# Patient Record
Sex: MALE | Race: BLACK OR AFRICAN AMERICAN | NOT HISPANIC OR LATINO
[De-identification: names, ages, dates, MRNs, and addresses within clinical notes are randomized per-mention and may not be internally consistent; named-entity substitution may affect disease eponyms.]

---

## 2019-12-30 PROBLEM — Z00.00 ENCOUNTER FOR PREVENTIVE HEALTH EXAMINATION: Status: ACTIVE | Noted: 2019-12-30

## 2020-01-09 ENCOUNTER — APPOINTMENT (OUTPATIENT)
Dept: UROLOGY | Facility: CLINIC | Age: 70
End: 2020-01-09
Payer: MEDICARE

## 2020-01-09 DIAGNOSIS — Z86.79 PERSONAL HISTORY OF OTHER DISEASES OF THE CIRCULATORY SYSTEM: ICD-10-CM

## 2020-01-09 DIAGNOSIS — I10 ESSENTIAL (PRIMARY) HYPERTENSION: ICD-10-CM

## 2020-01-09 DIAGNOSIS — Z80.8 FAMILY HISTORY OF MALIGNANT NEOPLASM OF OTHER ORGANS OR SYSTEMS: ICD-10-CM

## 2020-01-09 DIAGNOSIS — Z85.46 PERSONAL HISTORY OF MALIGNANT NEOPLASM OF PROSTATE: ICD-10-CM

## 2020-01-09 DIAGNOSIS — I48.20 CHRONIC ATRIAL FIBRILLATION, UNSP: ICD-10-CM

## 2020-01-09 DIAGNOSIS — Z80.42 FAMILY HISTORY OF MALIGNANT NEOPLASM OF PROSTATE: ICD-10-CM

## 2020-01-09 PROCEDURE — 99203 OFFICE O/P NEW LOW 30 MIN: CPT

## 2020-01-09 RX ORDER — WARFARIN 4 MG/1
TABLET ORAL
Refills: 0 | Status: ACTIVE | COMMUNITY

## 2020-01-09 NOTE — PHYSICAL EXAM
[General Appearance - Well Developed] : well developed [Normal Appearance] : normal appearance [General Appearance - Well Nourished] : well nourished [Well Groomed] : well groomed [Edema] : no peripheral edema [General Appearance - In No Acute Distress] : no acute distress [Respiration, Rhythm And Depth] : normal respiratory rhythm and effort [Exaggerated Use Of Accessory Muscles For Inspiration] : no accessory muscle use [Abdomen Soft] : soft [Abdomen Tenderness] : non-tender [Costovertebral Angle Tenderness] : no ~M costovertebral angle tenderness [Urethral Meatus] : meatus normal [Penis Abnormality] : normal uncircumcised penis [Urinary Bladder Findings] : the bladder was normal on palpation [No Prostate Nodules] : no prostate nodules [Scrotum] : the scrotum was normal [Testes Mass (___cm)] : there were no testicular masses [Prostate Size ___ gm] : prostate size [unfilled] gm [Normal Station and Gait] : the gait and station were normal for the patient's age [No Focal Deficits] : no focal deficits [] : no rash [Oriented To Time, Place, And Person] : oriented to person, place, and time [Affect] : the affect was normal [No Palpable Adenopathy] : no palpable adenopathy [Not Anxious] : not anxious [Mood] : the mood was normal

## 2020-01-10 NOTE — HISTORY OF PRESENT ILLNESS
[FreeTextEntry1] : Here to establish urologic care.\par Has prior history of prostate cancer status post radiation therapy about 5 years ago.\par He has been followed by an outside urologist and his PSAs remain low.  He is not aware of what his last PSA value was it was less than 1 ng/mL.\par \par He denies gross hematuria, dysuria.  Overall urinary function is excellent.  Good urinary flow, complete bladder emptying.  No stress or urge incontinence.\par \par No abdominal pain or flank pain.  Tolerating regular diet, no weight loss, bone pain.

## 2020-01-13 LAB — PSA SERPL-MCNC: 0.11 NG/ML

## 2021-10-06 PROBLEM — I10 ESSENTIAL HYPERTENSION: Status: ACTIVE | Noted: 2020-01-09

## 2021-11-24 ENCOUNTER — RESULT REVIEW (OUTPATIENT)
Age: 71
End: 2021-11-24

## 2022-03-08 ENCOUNTER — RESULT REVIEW (OUTPATIENT)
Age: 72
End: 2022-03-08

## 2022-03-16 ENCOUNTER — INPATIENT (INPATIENT)
Facility: HOSPITAL | Age: 72
LOS: 4 days | Discharge: HOME | End: 2022-03-21
Attending: INTERNAL MEDICINE | Admitting: INTERNAL MEDICINE
Payer: MEDICARE

## 2022-03-16 VITALS
RESPIRATION RATE: 18 BRPM | SYSTOLIC BLOOD PRESSURE: 222 MMHG | TEMPERATURE: 98 F | HEART RATE: 77 BPM | DIASTOLIC BLOOD PRESSURE: 105 MMHG | OXYGEN SATURATION: 99 %

## 2022-03-16 DIAGNOSIS — R94.31 ABNORMAL ELECTROCARDIOGRAM [ECG] [EKG]: ICD-10-CM

## 2022-03-16 DIAGNOSIS — I10 ESSENTIAL (PRIMARY) HYPERTENSION: ICD-10-CM

## 2022-03-16 DIAGNOSIS — I48.20 CHRONIC ATRIAL FIBRILLATION, UNSPECIFIED: ICD-10-CM

## 2022-03-16 DIAGNOSIS — N17.9 ACUTE KIDNEY FAILURE, UNSPECIFIED: ICD-10-CM

## 2022-03-16 DIAGNOSIS — Z87.828 PERSONAL HISTORY OF OTHER (HEALED) PHYSICAL INJURY AND TRAUMA: Chronic | ICD-10-CM

## 2022-03-16 DIAGNOSIS — I50.21 ACUTE SYSTOLIC (CONGESTIVE) HEART FAILURE: ICD-10-CM

## 2022-03-16 DIAGNOSIS — Z29.9 ENCOUNTER FOR PROPHYLACTIC MEASURES, UNSPECIFIED: ICD-10-CM

## 2022-03-16 DIAGNOSIS — I16.1 HYPERTENSIVE EMERGENCY: ICD-10-CM

## 2022-03-16 LAB
ALBUMIN SERPL ELPH-MCNC: 4.3 G/DL — SIGNIFICANT CHANGE UP (ref 3.3–5)
ALP SERPL-CCNC: 83 U/L — SIGNIFICANT CHANGE UP (ref 40–120)
ALT FLD-CCNC: 21 U/L — SIGNIFICANT CHANGE UP (ref 4–41)
ANION GAP SERPL CALC-SCNC: 12 MMOL/L — SIGNIFICANT CHANGE UP (ref 7–14)
APTT BLD: 38.8 SEC — HIGH (ref 27–36.3)
AST SERPL-CCNC: 20 U/L — SIGNIFICANT CHANGE UP (ref 4–40)
BASOPHILS # BLD AUTO: 0.05 K/UL — SIGNIFICANT CHANGE UP (ref 0–0.2)
BASOPHILS NFR BLD AUTO: 0.6 % — SIGNIFICANT CHANGE UP (ref 0–2)
BILIRUB SERPL-MCNC: 0.4 MG/DL — SIGNIFICANT CHANGE UP (ref 0.2–1.2)
BUN SERPL-MCNC: 32 MG/DL — HIGH (ref 7–23)
CALCIUM SERPL-MCNC: 10.1 MG/DL — SIGNIFICANT CHANGE UP (ref 8.4–10.5)
CHLORIDE SERPL-SCNC: 106 MMOL/L — SIGNIFICANT CHANGE UP (ref 98–107)
CO2 SERPL-SCNC: 27 MMOL/L — SIGNIFICANT CHANGE UP (ref 22–31)
CREAT SERPL-MCNC: 1.51 MG/DL — HIGH (ref 0.5–1.3)
EGFR: 49 ML/MIN/1.73M2 — LOW
EOSINOPHIL # BLD AUTO: 0.06 K/UL — SIGNIFICANT CHANGE UP (ref 0–0.5)
EOSINOPHIL NFR BLD AUTO: 0.7 % — SIGNIFICANT CHANGE UP (ref 0–6)
FLUAV AG NPH QL: SIGNIFICANT CHANGE UP
FLUBV AG NPH QL: SIGNIFICANT CHANGE UP
GLUCOSE BLDC GLUCOMTR-MCNC: 101 MG/DL — HIGH (ref 70–99)
GLUCOSE BLDC GLUCOMTR-MCNC: 102 MG/DL — HIGH (ref 70–99)
GLUCOSE BLDC GLUCOMTR-MCNC: 162 MG/DL — HIGH (ref 70–99)
GLUCOSE SERPL-MCNC: 109 MG/DL — HIGH (ref 70–99)
HCT VFR BLD CALC: 38.3 % — LOW (ref 39–50)
HGB BLD-MCNC: 11.8 G/DL — LOW (ref 13–17)
IANC: 6.22 K/UL — SIGNIFICANT CHANGE UP (ref 1.5–8.5)
IMM GRANULOCYTES NFR BLD AUTO: 0.6 % — SIGNIFICANT CHANGE UP (ref 0–1.5)
INR BLD: 2.65 RATIO — HIGH (ref 0.88–1.16)
LYMPHOCYTES # BLD AUTO: 0.91 K/UL — LOW (ref 1–3.3)
LYMPHOCYTES # BLD AUTO: 11.3 % — LOW (ref 13–44)
MAGNESIUM SERPL-MCNC: 2.4 MG/DL — SIGNIFICANT CHANGE UP (ref 1.6–2.6)
MCHC RBC-ENTMCNC: 25.8 PG — LOW (ref 27–34)
MCHC RBC-ENTMCNC: 30.8 GM/DL — LOW (ref 32–36)
MCV RBC AUTO: 83.8 FL — SIGNIFICANT CHANGE UP (ref 80–100)
MONOCYTES # BLD AUTO: 0.74 K/UL — SIGNIFICANT CHANGE UP (ref 0–0.9)
MONOCYTES NFR BLD AUTO: 9.2 % — SIGNIFICANT CHANGE UP (ref 2–14)
NEUTROPHILS # BLD AUTO: 6.22 K/UL — SIGNIFICANT CHANGE UP (ref 1.8–7.4)
NEUTROPHILS NFR BLD AUTO: 77.6 % — HIGH (ref 43–77)
NRBC # BLD: 0 /100 WBCS — SIGNIFICANT CHANGE UP
NRBC # FLD: 0 K/UL — SIGNIFICANT CHANGE UP
NT-PROBNP SERPL-SCNC: 3744 PG/ML — HIGH
PLATELET # BLD AUTO: 196 K/UL — SIGNIFICANT CHANGE UP (ref 150–400)
POTASSIUM SERPL-MCNC: 5.2 MMOL/L — SIGNIFICANT CHANGE UP (ref 3.5–5.3)
POTASSIUM SERPL-SCNC: 5.2 MMOL/L — SIGNIFICANT CHANGE UP (ref 3.5–5.3)
PROT SERPL-MCNC: 8.2 G/DL — SIGNIFICANT CHANGE UP (ref 6–8.3)
PROTHROM AB SERPL-ACNC: 31 SEC — HIGH (ref 10.5–13.4)
RBC # BLD: 4.57 M/UL — SIGNIFICANT CHANGE UP (ref 4.2–5.8)
RBC # FLD: 17.9 % — HIGH (ref 10.3–14.5)
RSV RNA NPH QL NAA+NON-PROBE: SIGNIFICANT CHANGE UP
SARS-COV-2 RNA SPEC QL NAA+PROBE: SIGNIFICANT CHANGE UP
SODIUM SERPL-SCNC: 145 MMOL/L — SIGNIFICANT CHANGE UP (ref 135–145)
TROPONIN T, HIGH SENSITIVITY RESULT: 31 NG/L — SIGNIFICANT CHANGE UP
TROPONIN T, HIGH SENSITIVITY RESULT: 31 NG/L — SIGNIFICANT CHANGE UP
WBC # BLD: 8.03 K/UL — SIGNIFICANT CHANGE UP (ref 3.8–10.5)
WBC # FLD AUTO: 8.03 K/UL — SIGNIFICANT CHANGE UP (ref 3.8–10.5)

## 2022-03-16 PROCEDURE — 71046 X-RAY EXAM CHEST 2 VIEWS: CPT | Mod: 26

## 2022-03-16 PROCEDURE — 99223 1ST HOSP IP/OBS HIGH 75: CPT

## 2022-03-16 PROCEDURE — 99285 EMERGENCY DEPT VISIT HI MDM: CPT

## 2022-03-16 RX ORDER — INSULIN LISPRO 100/ML
VIAL (ML) SUBCUTANEOUS AT BEDTIME
Refills: 0 | Status: DISCONTINUED | OUTPATIENT
Start: 2022-03-16 | End: 2022-03-21

## 2022-03-16 RX ORDER — SODIUM CHLORIDE 9 MG/ML
1000 INJECTION, SOLUTION INTRAVENOUS
Refills: 0 | Status: DISCONTINUED | OUTPATIENT
Start: 2022-03-16 | End: 2022-03-21

## 2022-03-16 RX ORDER — DEXTROSE 50 % IN WATER 50 %
12.5 SYRINGE (ML) INTRAVENOUS ONCE
Refills: 0 | Status: DISCONTINUED | OUTPATIENT
Start: 2022-03-16 | End: 2022-03-21

## 2022-03-16 RX ORDER — DEXTROSE 50 % IN WATER 50 %
15 SYRINGE (ML) INTRAVENOUS ONCE
Refills: 0 | Status: DISCONTINUED | OUTPATIENT
Start: 2022-03-16 | End: 2022-03-21

## 2022-03-16 RX ORDER — DEXTROSE 50 % IN WATER 50 %
25 SYRINGE (ML) INTRAVENOUS ONCE
Refills: 0 | Status: DISCONTINUED | OUTPATIENT
Start: 2022-03-16 | End: 2022-03-21

## 2022-03-16 RX ORDER — INFLUENZA VIRUS VACCINE 15; 15; 15; 15 UG/.5ML; UG/.5ML; UG/.5ML; UG/.5ML
0.7 SUSPENSION INTRAMUSCULAR ONCE
Refills: 0 | Status: DISCONTINUED | OUTPATIENT
Start: 2022-03-16 | End: 2022-03-21

## 2022-03-16 RX ORDER — HYDRALAZINE HCL 50 MG
50 TABLET ORAL EVERY 8 HOURS
Refills: 0 | Status: DISCONTINUED | OUTPATIENT
Start: 2022-03-16 | End: 2022-03-19

## 2022-03-16 RX ORDER — GLUCAGON INJECTION, SOLUTION 0.5 MG/.1ML
1 INJECTION, SOLUTION SUBCUTANEOUS ONCE
Refills: 0 | Status: DISCONTINUED | OUTPATIENT
Start: 2022-03-16 | End: 2022-03-21

## 2022-03-16 RX ORDER — FUROSEMIDE 40 MG
40 TABLET ORAL DAILY
Refills: 0 | Status: DISCONTINUED | OUTPATIENT
Start: 2022-03-16 | End: 2022-03-17

## 2022-03-16 RX ORDER — AMLODIPINE BESYLATE 2.5 MG/1
10 TABLET ORAL ONCE
Refills: 0 | Status: COMPLETED | OUTPATIENT
Start: 2022-03-16 | End: 2022-03-16

## 2022-03-16 RX ORDER — INSULIN LISPRO 100/ML
VIAL (ML) SUBCUTANEOUS
Refills: 0 | Status: DISCONTINUED | OUTPATIENT
Start: 2022-03-16 | End: 2022-03-21

## 2022-03-16 RX ORDER — METOPROLOL TARTRATE 50 MG
200 TABLET ORAL DAILY
Refills: 0 | Status: DISCONTINUED | OUTPATIENT
Start: 2022-03-16 | End: 2022-03-21

## 2022-03-16 RX ORDER — HYDRALAZINE HCL 50 MG
50 TABLET ORAL THREE TIMES A DAY
Refills: 0 | Status: DISCONTINUED | OUTPATIENT
Start: 2022-03-16 | End: 2022-03-16

## 2022-03-16 RX ORDER — FUROSEMIDE 40 MG
40 TABLET ORAL ONCE
Refills: 0 | Status: DISCONTINUED | OUTPATIENT
Start: 2022-03-16 | End: 2022-03-16

## 2022-03-16 RX ORDER — ATORVASTATIN CALCIUM 80 MG/1
10 TABLET, FILM COATED ORAL AT BEDTIME
Refills: 0 | Status: DISCONTINUED | OUTPATIENT
Start: 2022-03-16 | End: 2022-03-21

## 2022-03-16 RX ORDER — ACETAMINOPHEN 500 MG
650 TABLET ORAL EVERY 6 HOURS
Refills: 0 | Status: DISCONTINUED | OUTPATIENT
Start: 2022-03-16 | End: 2022-03-21

## 2022-03-16 RX ADMIN — Medication 50 MILLIGRAM(S): at 21:43

## 2022-03-16 RX ADMIN — Medication 1: at 18:05

## 2022-03-16 RX ADMIN — AMLODIPINE BESYLATE 10 MILLIGRAM(S): 2.5 TABLET ORAL at 14:33

## 2022-03-16 RX ADMIN — Medication 40 MILLIGRAM(S): at 18:07

## 2022-03-16 RX ADMIN — ATORVASTATIN CALCIUM 10 MILLIGRAM(S): 80 TABLET, FILM COATED ORAL at 21:43

## 2022-03-16 NOTE — ED PROVIDER NOTE - CLINICAL SUMMARY MEDICAL DECISION MAKING FREE TEXT BOX
71M presents with hyptension and possibly EKG changes as outpatient. Will Check labs, trops, EKG. Likely admit for further ACS w/u. 71M presents with hypertension and possibly EKG changes as outpatient. Will Check labs, trops, EKG. Likely admit for further ACS w/u.
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
## 2022-03-16 NOTE — H&P ADULT - PROBLEM SELECTOR PLAN 2
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

## 2022-03-16 NOTE — ED PROVIDER NOTE - OBJECTIVE STATEMENT
71M PMH HTN, CHF, afib on warfarin, presents from PMD for HTN. Pt went in for routine visit today, but was seen by a different MD. MD states when in the office pt had elevated blood pressure, diaphoresis in EKG changes in precordial leads. Pt did not have CP at that time. Also reports pt had small increase in his creatinine since basline. Pt reports baseline amount of LE edema and exercise tolerance.

## 2022-03-16 NOTE — H&P ADULT - NSHPREVIEWOFSYSTEMS_GEN_ALL_CORE
REVIEW OF SYSTEMS:    CONSTITUTIONAL: No weakness, fevers or chills, + diaphoresis  EYES/ENT: No visual changes;  No dysphagia; No sore throat; No rhinorrhea; No sinus pain/pressure  NECK: No pain or stiffness  RESPIRATORY: No cough, wheezing, hemoptysis; No shortness of breath  CARDIOVASCULAR: No chest pain or palpitations; + chronic lower extremity edema  GASTROINTESTINAL: No abdominal or epigastric pain. No nausea, vomiting, or hematemesis; No diarrhea or constipation. No melena or hematochezia.  GENITOURINARY: No dysuria, frequency or hematuria  NEUROLOGICAL: No numbness or weakness  MSK: ambulates with walker  SKIN: No itching, burning, rashes, or lesions   All other review of systems is negative unless indicated above.

## 2022-03-16 NOTE — ED ADULT TRIAGE NOTE - CHIEF COMPLAINT QUOTE
Patient brought to ER by EMS from PCP physical and sent over for elevated blood pressure. No symptoms. Pt is compliant with meds.

## 2022-03-16 NOTE — H&P ADULT - PROBLEM SELECTOR PLAN 3
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

## 2022-03-16 NOTE — CONSULT NOTE ADULT - SUBJECTIVE AND OBJECTIVE BOX
EP Attending    HISTORY OF PRESENT ILLNESS:   Mr Connelly is a pleasant 71yoM who presents to the ED from his primary care doctor's office for concern of severely elevated blood pressure today, and worsening shortness of breath for 2+ weeks.  He was previously able to walk a few blocks without stopping to catch his breath, now cannot walk one block.  States he has had AFib for ~4 years, takes Warfarin, with a goal INR of 2-3.  He has hypertension and CKD.  Has intentionally lost ~25 lbs.  No orthopnea, sleeps on his side with 2 pillows (stable).  No fainting or lightheadedness.   He has never used supplemental oxygen.  No prior blood clots, no prior smoking beyond age 16.  A 10 pt ROS is otherwise negative.    PAST MEDICAL & SURGICAL HISTORY:  Gout    Diabetes    Hypertension    Afib    No significant past surgical history    MEDICATIONS  (STANDING):      Allergies    No Known Allergies    Intolerances    FAMILY HISTORY:  No significant family history      Non-contributary for premature coronary disease or sudden cardiac death    SOCIAL HISTORY:    [x ] Non-smoker  [ ] Smoker  [ ] Alcohol    PHYSICAL EXAM:  T(C): 36.7 (03-16-22 @ 13:00), Max: 36.7 (03-16-22 @ 13:00)  HR: 85 (03-16-22 @ 14:16) (77 - 85)  BP: 192/98 (03-16-22 @ 14:16) (192/98 - 222/105)  RR: 18 (03-16-22 @ 14:16) (18 - 18)  SpO2: 100% (03-16-22 @ 14:16) (99% - 100%)  Wt(kg): --    Appearance: pleasant adult male in no acute distress, nondiaphoretic, cooperative.  HEENT:   Normal oral mucosa, PERRL, EOMI	  Lymphatic: No lymphadenopathy , trace ankle edema BL  Cardiovascular: irregular rhythm / normal rate, S1 S2, visible JVD, No murmurs , Peripheral pulses palpable 2+ bilaterally  Respiratory: Lungs clear to auscultation, normal effort but tachypneic 	  Gastrointestinal:  Soft, Non-tender, + BS	  Skin: No rashes, No ecchymoses, No cyanosis, warm to touch  Musculoskeletal: Normal range of motion, normal strength  Psychiatry:  Mood & affect appropriate    TELEMETRY: AF rate controlled  ECG:  AF, no R waves out to V3 / pattern of prior anteroseptal MI.	  	  LABS:	 	                          11.8   8.03  )-----------( 196      ( 16 Mar 2022 14:35 )             38.3     03-16    145  |  106  |  32<H>  ----------------------------<  109<H>  5.2   |  27  |  1.51<H>    Ca    10.1      16 Mar 2022 14:22  Mg     2.40     03-16    TPro  8.2  /  Alb  4.3  /  TBili  0.4  /  DBili  x   /  AST  20  /  ALT  21  /  AlkPhos  83  03-16    proBNP: Serum Pro-Brain Natriuretic Peptide: 3744 pg/mL (03-16 @ 14:22)    ASSESSMENT/PLAN: 	71y Male with chronic AFib here with signs of diastolic CHF and right heart failure (JVD, edema, abd distension, SOB on exertion).    Recommend admission to telemetry bed.  Echocardiogram.  Remainder of Cv workup per Dr Kasper.  Maintain telemetry.  Maintain HR control with beta blockers as prescribed in outpatient setting.  Maintain Warfarin for goal INR of 2-3.  As an outpatient he could transition to Apixaban 5mg BID.  Will follow.      Minor Tolbert M.D.  Cardiac Electrophysiology    office [phone]  pager [phone]

## 2022-03-16 NOTE — H&P ADULT - NSHPPHYSICALEXAM_GEN_ALL_CORE
PHYSICAL EXAM:  VITALS: Vital Signs Last 24 Hrs  T(C): 36.9 (16 Mar 2022 17:47), Max: 36.9 (16 Mar 2022 17:47)  T(F): 98.4 (16 Mar 2022 17:47), Max: 98.4 (16 Mar 2022 17:47)  HR: 86 (16 Mar 2022 17:47) (77 - 86)  BP: 166/70 (16 Mar 2022 17:47) (166/70 - 222/105)  BP(mean): --  RR: 18 (16 Mar 2022 17:47) (18 - 18)  SpO2: 100% (16 Mar 2022 17:47) (99% - 100%)  GENERAL: NAD, well-developed, well-nourished  HEAD:  Atraumatic, Normocephalic  EYES: EOMI, PERRL, conjunctiva and sclera clear  ENT: Moist Mucus Membranes present, no ulcers appreciated  NECK: Supple, difficult to appreciate JVD due to thick neck  CHEST/LUNG: Clear to auscultation bilaterally; No wheezes, rales or rhonchi, no accessory muscle use  HEART: Regular rate and rhythm; No murmurs, rubs, or gallops, (+)S1, S2  ABDOMEN: Soft, Nontender, Nondistended; Normal Bowel sounds   EXTREMITIES:  2+ Peripheral Pulses, No clubbing, cyanosis, no LE edema b/l  PSYCH: normal mood and affect  NEUROLOGY: AAOx3, non-focal  SKIN: No rashes or lesions

## 2022-03-16 NOTE — H&P ADULT - HISTORY OF PRESENT ILLNESS
This is a 70 y/o M with pmhx of HTN, CHF, afib, DM presented to the ED for HTN from clinic. The patient is his usual baseline, no symptoms and feeling well, presented to routine check up at his PCP. The patient found to have severely elevated BP in the 200s and abnormal EKG. 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 Patient denies hx of CAD or MI in the past. The patient also endorsed some mild diaphoresis in the office but attributed it to wearing a lot of clothing. The PCP was concerned so he was sent to the ED for evaluation. The patient also states he has chronic SAENZ for a long time and it doesn't bother him. He currently denies new headaches or focal deficits. He currently denies chest pain and shortness of breath at rest. He is known to have chronic LE edema which has not changed. Denies hx of kidney disease

## 2022-03-16 NOTE — H&P ADULT - ASSESSMENT
72 y/o M with pmhx of HTN, CHF, afib, DM presented to the ED for HTN from clinic. 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 Admit for HTN emergency, RERE and ACS work up.

## 2022-03-16 NOTE — H&P ADULT - PROBLEM SELECTOR PLAN 6
[Time Spent: ___ minutes] : I have spent [unfilled] minutes of time on the encounter.
already anticoagulated

## 2022-03-16 NOTE — ED ADULT NURSE NOTE - OBJECTIVE STATEMENT
patient arrives to the ER A&Ox4, ambulatory with walker at baseline. patient arrives from PCP office for elevated BP and states "they didn't like something on my EKG". patient has PMH of Afib, takes coumadin daily. patient also states he has a PMH of HTN and DM, compliant with meds. patient is well appearing, denies any chest pain, shortness of breath, nausea or vomiting. breathing even and unlabored, pallor/diaphoresis not noted. 20G IV placed in L AC. Labs sent. all safety maintained at this time. no acute distress noted. vital signs as charted. patient pending results currently.

## 2022-03-16 NOTE — CONSULT NOTE ADULT - ATTENDING COMMENTS
Patient seen and examined.  Agree with above.   Admitted with volume overload  IV lasix to keep O > I   Check TTE to evaluate LV function   Further workup pending above    Armando Kasper MD

## 2022-03-16 NOTE — CONSULT NOTE ADULT - SUBJECTIVE AND OBJECTIVE BOX
HISTORY OF PRESENT ILLNESS: HPI:    71 year old male with PMH HTN, CKD, DM, Gout, AF on Coumadin, (OP Cardio is Eleno), who presented to ER from PMD where he was found to be hypertensive and admits to worsening SAENZ.  He reports chronic R knee pain that limits his exercise capacity but admits his SAENZ and LE edema has worsened over a few months.  No PND or orthopnea.  Reports compliance with home meds (see list)  His PMD also had concern over EKG findings.    He denies chest pain or hx CAD.  He denies palps, syncope, fever, cough, recent travel.        PAST MEDICAL & SURGICAL HISTORY:  Gout    Diabetes    Hypertension    Afib    No significant past surgical history      MEDICATIONS  (STANDING): home list on paper chart      Allergies  No Known Allergies      FAMILY HISTORY:  No significant family history  Noncontributory for premature coronary disease or sudden cardiac death    SOCIAL HISTORY:    [x ] Non-smoker  [ ] Smoker  [ ] Alcohol    FLU VACCINE THIS YEAR STARTS IN AUGUST:  [ ] Yes    [ ] No    IF OVER 65 HAVE YOU EVER HAD A PNA VACCINE:  [ ] Yes    [ ] No       [ ] N/A      REVIEW OF SYSTEMS:  [ ]chest pain  [ x ]shortness of breath  [  ]palpitations  [  ]syncope  [ ]near syncope [ ]upper extremity weakness   [ ] lower extremity weakness  [  ]diplopia  [  ]altered mental status   [  ]fevers  [ ]chills [ ]nausea  [ ]vomiting  [  ]dysphagia    [ ]abdominal pain  [ ]melena  [ ]BRBPR    [  ]epistaxis  [  ]rash    [ ]lower extremity edema        [x ] All others negative	  [ ] Unable to obtain      LABS:	 	    CARDIAC MARKERS:  trop t 31, 31                        11.8   8.03  )-----------( 196      ( 16 Mar 2022 14:35 )             38.3     145  |  106  |  32<H>  ----------------------------<  109<H>  5.2   |  27  |  1.51<H>    Ca    10.1      16 Mar 2022 14:22  Mg     2.40     03-16    TPro  8.2  /  Alb  4.3  /  TBili  0.4  /  DBili  x   /  AST  20  /  ALT  21  /  AlkPhos  83  03-16  Coags:  PT/INR - ( 16 Mar 2022 15:20 )   PT: 31.0 sec;   INR: 2.65 ratio      PTT - ( 16 Mar 2022 15:20 )  PTT:38.8 sec    proBNP: Serum Pro-Brain Natriuretic Peptide: 3744 pg/mL (03-16 @ 14:22)      PHYSICAL EXAM:  T(C): 36.7 (03-16-22 @ 13:00), Max: 36.7 (03-16-22 @ 13:00)  HR: 85 (03-16-22 @ 16:03) (77 - 85)  BP: 186/101 (03-16-22 @ 16:03) (186/101 - 222/105)  RR: 18 (03-16-22 @ 16:03) (18 - 18)  SpO2: 100% (03-16-22 @ 16:03) (99% - 100%)    Gen: Appears SOB sitting on stretcher  HEENT:  (-)icterus (-)pallor +JVP  CV: N S1 S2 1/6 BRANDON (+)2 Pulses B/l  Resp: diminished BS BL bases  GI: (+) BS Soft, NT, ND  Lymph:  (2+) LE Edema, (-)obvious lymphadenopathy  Skin: Warm to touch, Normal turgor  Psych: Appropriate mood and affect    	  ECG:  	 AF PRWP no TEVIN    CXR report pending    ASSESSMENT/PLAN: 	71 year old male with PMH HTN, CKD, DM, Gout, AF on Coumadin, (OP Cardio is [facility]), who presented to ER from PMD where he was found to be hypertensive and admits to worsening SAENZ.    --Pt with signs of volume overload on exam  --change to IV lasix 40 QD  --will consult renal  --EP consult called  --check TTE  --AF on Coumadin, reports he has never discussed changing to a NOAC.  Will reach out to OP Cardio Dr Johansen and pt pharmacy to see if any NOAC covered by plan  --ischemic eval pending above             HISTORY OF PRESENT ILLNESS: HPI:    71 year old male with PMH HTN, CKD, DM, Gout, AF on Coumadin, (OP Cardio is [facility]), who presented to ER from PMD where he was found to be hypertensive and admits to worsening SAENZ.  He reports chronic R knee pain that limits his exercise capacity but admits his SAENZ and LE edema has worsened over a few months.  No PND or orthopnea.  Reports compliance with home meds (see list)  His PMD also had concern over EKG findings.    He denies chest pain or hx CAD.  He denies palps, syncope, fever, cough, recent travel.        PAST MEDICAL & SURGICAL HISTORY:  Gout    Diabetes    Hypertension    Afib    No significant past surgical history      MEDICATIONS  (STANDING): home list on paper chart      Allergies  No Known Allergies      FAMILY HISTORY:  No significant family history  Noncontributory for premature coronary disease or sudden cardiac death    SOCIAL HISTORY:    [x ] Non-smoker  [ ] Smoker  [ ] Alcohol    FLU VACCINE THIS YEAR STARTS IN AUGUST:  [ ] Yes    [ ] No    IF OVER 65 HAVE YOU EVER HAD A PNA VACCINE:  [ ] Yes    [ ] No       [ ] N/A      REVIEW OF SYSTEMS:  [ ]chest pain  [ x ]shortness of breath  [  ]palpitations  [  ]syncope  [ ]near syncope [ ]upper extremity weakness   [ ] lower extremity weakness  [  ]diplopia  [  ]altered mental status   [  ]fevers  [ ]chills [ ]nausea  [ ]vomiting  [  ]dysphagia    [ ]abdominal pain  [ ]melena  [ ]BRBPR    [  ]epistaxis  [  ]rash    [ ]lower extremity edema        [x ] All others negative	  [ ] Unable to obtain      LABS:	 	    CARDIAC MARKERS:  trop t 31, 31                        11.8   8.03  )-----------( 196      ( 16 Mar 2022 14:35 )             38.3     145  |  106  |  32<H>  ----------------------------<  109<H>  5.2   |  27  |  1.51<H>    Ca    10.1      16 Mar 2022 14:22  Mg     2.40     03-16    TPro  8.2  /  Alb  4.3  /  TBili  0.4  /  DBili  x   /  AST  20  /  ALT  21  /  AlkPhos  83  03-16  Coags:  PT/INR - ( 16 Mar 2022 15:20 )   PT: 31.0 sec;   INR: 2.65 ratio      PTT - ( 16 Mar 2022 15:20 )  PTT:38.8 sec    proBNP: Serum Pro-Brain Natriuretic Peptide: 3744 pg/mL (03-16 @ 14:22)      PHYSICAL EXAM:  T(C): 36.7 (03-16-22 @ 13:00), Max: 36.7 (03-16-22 @ 13:00)  HR: 85 (03-16-22 @ 16:03) (77 - 85)  BP: 186/101 (03-16-22 @ 16:03) (186/101 - 222/105)  RR: 18 (03-16-22 @ 16:03) (18 - 18)  SpO2: 100% (03-16-22 @ 16:03) (99% - 100%)    Gen: Appears SOB sitting on stretcher  HEENT:  (-)icterus (-)pallor +JVP  CV: N S1 S2 1/6 BRANDON (+)2 Pulses B/l  Resp: diminished BS BL bases  GI: (+) BS Soft, NT, ND  Lymph:  (2+) LE Edema, (-)obvious lymphadenopathy  Skin: Warm to touch, Normal turgor  Psych: Appropriate mood and affect    	  ECG:  	 AF PRWP no TEVIN    CXR report pending    ASSESSMENT/PLAN: 	71 year old male with PMH HTN, CKD, DM, Gout, AF on Coumadin, (OP Cardio pattie Johansen), who presented to ER from PMD where he was found to be hypertensive and admits to worsening SAENZ.    --Pt with signs of volume overload on exam  --change to IV lasix 40 QD  --will consult renal  --EP consult called  --check TTE  --AF on Coumadin, cont for now, goal INR 2-3; reports he has never discussed changing to a NOAC.  Will reach out to OP Cardio Dr Johansen and pt pharmacy to see if any NOAC covered by plan  --ischemic eval pending above

## 2022-03-16 NOTE — H&P ADULT - PROBLEM SELECTOR PLAN 5
Assessment:  - hx of afib    Plan:  - patient INR is 2.6  - the patient takes multiple different doses of warfarin, but as per patient the most recent script was 4mg QD  - would hold for now because patient is high risk for ICH due to high blood pressure  - can consider restarting warfarin 4mg QD tomorrow once BP is more stable

## 2022-03-16 NOTE — H&P ADULT - PROBLEM SELECTOR PLAN 1
Assessment:  - patient presented for high blood pressure from office  - EMS triage showed 242 systolic BP, arrived in the ED with 222/105 and now it is 166/70  - s/p norvasc 10 in the ED  - patient took his BP meds but did not take his cardaic meds  - at home: HCTZ 25mg, Lisinopril 40mg QD, Metoprolol succinate 200mg QD, Spironolactone 25mg BID    Plan:  - patient has new onset RERE, will hold lisinopril, HCTZ and spironolactone  - restart hydralazine. Will start at 50mg TID for now since the patient's BP is significantly improved compared to at admission  - c/w metoprolol succinate for afib and BP control  - Correct to 160/110 BP today, then to 120/80 tomorrow  - neuro checks Q4hr, patient is on anticoagulation for afib therefore high risk for intracranial hemorrhage - patient endorsed understanding to report symptoms of focal deficits and headaches to the RN if present. Will get STAT CT head if present  - Vital signs Q4hr  - tele monitoring Assessment:  - patient presented for high blood pressure from office  - EMS triage showed 242 systolic BP, arrived in the ED with 222/105 and now it is 166/70  - s/p norvasc 10 in the ED  - patient took his BP meds but did not take his cardaic meds  - at home: HCTZ 25mg, Lisinopril 40mg QD, Metoprolol succinate 200mg QD, Spironolactone 25mg BID    Plan:  - patient has new onset RERE, will hold lisinopril, HCTZ and spironolactone  - restart hydralazine. Will start at 50mg TID for now since the patient's BP is significantly improved compared to at admission  - c/w metoprolol succinate for afib and BP control  - Correct to ~160/110 BP in 24hr, then 120/80 in 48hr  - neuro checks Q4hr, patient is on anticoagulation for afib therefore high risk for intracranial hemorrhage - patient endorsed understanding to report symptoms of focal deficits and headaches to the RN if present. Will get STAT CT head if present  - Vital signs Q4hr  - tele monitoring

## 2022-03-16 NOTE — ED PROVIDER NOTE - CARE PLAN
Principal Discharge DX:	Hypertension   1 Principal Discharge DX:	Hypertensive urgency  Secondary Diagnosis:	Prerenal azotemia

## 2022-03-16 NOTE — PATIENT PROFILE ADULT - FALL HARM RISK - RISK INTERVENTIONS
Assistance OOB with selected safe patient handling equipment/Assistance with ambulation/Communicate Fall Risk and Risk Factors to all staff, patient, and family/Discuss with provider need for PT consult/Monitor gait and stability/Provide patient with walking aids - walker, cane, crutches/Reinforce activity limits and safety measures with patient and family/Sit up slowly, dangle for a short time, stand at bedside before walking/Visual Cue: Yellow wristband/Bed in lowest position, wheels locked, appropriate side rails in place/Call bell, personal items and telephone in reach/Instruct patient to call for assistance before getting out of bed or chair/Non-slip footwear when patient is out of bed/[place] to call system/Physically safe environment - no spills, clutter or unnecessary equipment/Purposeful Proactive Rounding/Room/bathroom lighting operational, light cord in reach

## 2022-03-16 NOTE — ED PROVIDER NOTE - ATTENDING CONTRIBUTION TO CARE
The patieint is a 70 y/o M who has a past medical history of HTN, CHF, afib/warfarin , DM PTED from PMD with HTN associated with diaphoresis and supposed EKG changes in v3-4; Patient asymptomatic in ED and minimizes symptoms in clinic and outpt SOB which were relayed by family.    /105 mm Hg HR 77 /min RR 18 /min T 98 Degrees F SPO2 99 %  PE; as documented without exception  DATA:  EKG: AF@80; ? age anterior infarct No old EKG  RESULTS: All significant/relevant labs and imaging results present during the time of evaluation have been entered into chart through pullset function and are discussed below    MDM:  IMPRESSION: HTN urgency resolved;   Considerations; troponins borderline but negative in face of prerenal azotemia (32/1.5) Admit for HTN control 2/2/ urgency, BP: 222/105 now  in the 160s with no intervention; meds management as per inpatient team (would restart home meds)

## 2022-03-16 NOTE — ED PROVIDER NOTE - NSICDXFAMILYHX_GEN_ALL_CORE_FT
FAMILY HISTORY:  No significant family history     FAMILY HISTORY:  Father  Still living? Unknown  FH: myocardial infarction, Age at diagnosis: Age Unknown

## 2022-03-16 NOTE — H&P ADULT - PROBLEM/PLAN-3
"Assisted pt with bedpan. Pt asked for diaper. Diaper placed due to pt's request. PT is sitting in bed eating sandwich tray. States "I am feeling better." Lung sounds clear on auscultation. Derm is pink, warm, and dry. Respirations are even and unlabored    " DISPLAY PLAN FREE TEXT

## 2022-03-16 NOTE — H&P ADULT - PROBLEM SELECTOR PLAN 4
Assessment:  - hx of CHF  - chronic LE edema but has SAENZ    Plan:  - can give 1 dose of 40mg lasix IV push as per cardiology  - monitor I/O  - fluid restrict Assessment:  - hx of CHF  - chronic LE edema but has SAENZ  - mild pulmonary congestion on CXR  - likely secondary to HTN emergency causing acute on chronic CHF exacerbation    Plan:  - can give 1 dose of 40mg lasix IV push as per cardiology  - monitor I/O  - fluid restrict

## 2022-03-16 NOTE — H&P ADULT - NSHPLABSRESULTS_GEN_ALL_CORE
11.8   8.03  )-----------( 196      ( 16 Mar 2022 14:35 )             38.3       03-16    145  |  106  |  32<H>  ----------------------------<  109<H>  5.2   |  27  |  1.51<H>    Ca    10.1      16 Mar 2022 14:22  Mg     2.40     03-16    TPro  8.2  /  Alb  4.3  /  TBili  0.4  /  DBili  x   /  AST  20  /  ALT  21  /  AlkPhos  83  03-16            PT/INR - ( 16 Mar 2022 15:20 )   PT: 31.0 sec;   INR: 2.65 ratio         PTT - ( 16 Mar 2022 15:20 )  PTT:38.8 sec

## 2022-03-16 NOTE — ED PROVIDER NOTE - PHYSICAL EXAMINATION
General: Well developed, well nourished  HEENT: Normocephalic and atraumatic, EOMI, Trachea midline.   Cardiac: Normal S1 and S2 w/ irregular. No MRG.  Pulmonary: CTA bilaterally. No increased WOB.   Abdominal: Soft, NTND  Neurologic: No focal sensory or motor deficits.  Musculoskeletal: No limited ROM or deformities  Vascular: Warm and well perfused  Skin: Color appropriate   Psychiatric: Appropriate mood and affect. No apparent risk to self or others.  Kranthi Johnson M.D.

## 2022-03-17 LAB
A1C WITH ESTIMATED AVERAGE GLUCOSE RESULT: 5.8 % — HIGH (ref 4–5.6)
ALBUMIN SERPL ELPH-MCNC: 4.1 G/DL — SIGNIFICANT CHANGE UP (ref 3.3–5)
ALP SERPL-CCNC: 80 U/L — SIGNIFICANT CHANGE UP (ref 40–120)
ALT FLD-CCNC: 21 U/L — SIGNIFICANT CHANGE UP (ref 4–41)
ANION GAP SERPL CALC-SCNC: 14 MMOL/L — SIGNIFICANT CHANGE UP (ref 7–14)
APPEARANCE UR: CLEAR — SIGNIFICANT CHANGE UP
AST SERPL-CCNC: 21 U/L — SIGNIFICANT CHANGE UP (ref 4–40)
BASOPHILS # BLD AUTO: 0.04 K/UL — SIGNIFICANT CHANGE UP (ref 0–0.2)
BASOPHILS NFR BLD AUTO: 0.5 % — SIGNIFICANT CHANGE UP (ref 0–2)
BILIRUB SERPL-MCNC: 0.5 MG/DL — SIGNIFICANT CHANGE UP (ref 0.2–1.2)
BILIRUB UR-MCNC: NEGATIVE — SIGNIFICANT CHANGE UP
BUN SERPL-MCNC: 31 MG/DL — HIGH (ref 7–23)
CALCIUM SERPL-MCNC: 9.9 MG/DL — SIGNIFICANT CHANGE UP (ref 8.4–10.5)
CHLORIDE SERPL-SCNC: 103 MMOL/L — SIGNIFICANT CHANGE UP (ref 98–107)
CHOLEST SERPL-MCNC: 121 MG/DL — SIGNIFICANT CHANGE UP
CO2 SERPL-SCNC: 26 MMOL/L — SIGNIFICANT CHANGE UP (ref 22–31)
COLOR SPEC: SIGNIFICANT CHANGE UP
CREAT ?TM UR-MCNC: 55 MG/DL — SIGNIFICANT CHANGE UP
CREAT SERPL-MCNC: 1.31 MG/DL — HIGH (ref 0.5–1.3)
DIFF PNL FLD: NEGATIVE — SIGNIFICANT CHANGE UP
EGFR: 58 ML/MIN/1.73M2 — LOW
EOSINOPHIL # BLD AUTO: 0.13 K/UL — SIGNIFICANT CHANGE UP (ref 0–0.5)
EOSINOPHIL NFR BLD AUTO: 1.6 % — SIGNIFICANT CHANGE UP (ref 0–6)
ESTIMATED AVERAGE GLUCOSE: 120 — SIGNIFICANT CHANGE UP
GLUCOSE BLDC GLUCOMTR-MCNC: 107 MG/DL — HIGH (ref 70–99)
GLUCOSE BLDC GLUCOMTR-MCNC: 117 MG/DL — HIGH (ref 70–99)
GLUCOSE BLDC GLUCOMTR-MCNC: 94 MG/DL — SIGNIFICANT CHANGE UP (ref 70–99)
GLUCOSE SERPL-MCNC: 95 MG/DL — SIGNIFICANT CHANGE UP (ref 70–99)
GLUCOSE UR QL: NEGATIVE — SIGNIFICANT CHANGE UP
HCT VFR BLD CALC: 38.5 % — LOW (ref 39–50)
HCV AB S/CO SERPL IA: 0.15 S/CO — SIGNIFICANT CHANGE UP (ref 0–0.99)
HCV AB SERPL-IMP: SIGNIFICANT CHANGE UP
HDLC SERPL-MCNC: 84 MG/DL — SIGNIFICANT CHANGE UP
HGB BLD-MCNC: 12.3 G/DL — LOW (ref 13–17)
IANC: 5.4 K/UL — SIGNIFICANT CHANGE UP (ref 1.5–8.5)
IMM GRANULOCYTES NFR BLD AUTO: 0.4 % — SIGNIFICANT CHANGE UP (ref 0–1.5)
INR BLD: 2.44 RATIO — HIGH (ref 0.88–1.16)
KETONES UR-MCNC: NEGATIVE — SIGNIFICANT CHANGE UP
LEUKOCYTE ESTERASE UR-ACNC: NEGATIVE — SIGNIFICANT CHANGE UP
LIPID PNL WITH DIRECT LDL SERPL: 31 MG/DL — SIGNIFICANT CHANGE UP
LYMPHOCYTES # BLD AUTO: 1.24 K/UL — SIGNIFICANT CHANGE UP (ref 1–3.3)
LYMPHOCYTES # BLD AUTO: 15.7 % — SIGNIFICANT CHANGE UP (ref 13–44)
MAGNESIUM SERPL-MCNC: 2.2 MG/DL — SIGNIFICANT CHANGE UP (ref 1.6–2.6)
MCHC RBC-ENTMCNC: 26.2 PG — LOW (ref 27–34)
MCHC RBC-ENTMCNC: 31.9 GM/DL — LOW (ref 32–36)
MCV RBC AUTO: 82.1 FL — SIGNIFICANT CHANGE UP (ref 80–100)
MONOCYTES # BLD AUTO: 1.08 K/UL — HIGH (ref 0–0.9)
MONOCYTES NFR BLD AUTO: 13.6 % — SIGNIFICANT CHANGE UP (ref 2–14)
NEUTROPHILS # BLD AUTO: 5.4 K/UL — SIGNIFICANT CHANGE UP (ref 1.8–7.4)
NEUTROPHILS NFR BLD AUTO: 68.2 % — SIGNIFICANT CHANGE UP (ref 43–77)
NITRITE UR-MCNC: NEGATIVE — SIGNIFICANT CHANGE UP
NON HDL CHOLESTEROL: 37 MG/DL — SIGNIFICANT CHANGE UP
NRBC # BLD: 0 /100 WBCS — SIGNIFICANT CHANGE UP
NRBC # FLD: 0 K/UL — SIGNIFICANT CHANGE UP
PH UR: 6.5 — SIGNIFICANT CHANGE UP (ref 5–8)
PHOSPHATE SERPL-MCNC: 4 MG/DL — SIGNIFICANT CHANGE UP (ref 2.5–4.5)
PLATELET # BLD AUTO: 199 K/UL — SIGNIFICANT CHANGE UP (ref 150–400)
POTASSIUM SERPL-MCNC: 4.6 MMOL/L — SIGNIFICANT CHANGE UP (ref 3.5–5.3)
POTASSIUM SERPL-SCNC: 4.6 MMOL/L — SIGNIFICANT CHANGE UP (ref 3.5–5.3)
PROT SERPL-MCNC: 8.3 G/DL — SIGNIFICANT CHANGE UP (ref 6–8.3)
PROT UR-MCNC: ABNORMAL
PROTHROM AB SERPL-ACNC: 28.6 SEC — HIGH (ref 10.5–13.4)
RBC # BLD: 4.69 M/UL — SIGNIFICANT CHANGE UP (ref 4.2–5.8)
RBC # FLD: 18 % — HIGH (ref 10.3–14.5)
SODIUM SERPL-SCNC: 143 MMOL/L — SIGNIFICANT CHANGE UP (ref 135–145)
SODIUM UR-SCNC: 113 MMOL/L — SIGNIFICANT CHANGE UP
SP GR SPEC: 1.01 — SIGNIFICANT CHANGE UP (ref 1–1.05)
TRIGL SERPL-MCNC: 30 MG/DL — SIGNIFICANT CHANGE UP
TSH SERPL-MCNC: 1.17 UIU/ML — SIGNIFICANT CHANGE UP (ref 0.27–4.2)
TSH SERPL-MCNC: 1.17 UIU/ML — SIGNIFICANT CHANGE UP (ref 0.27–4.2)
UROBILINOGEN FLD QL: SIGNIFICANT CHANGE UP
UUN UR-MCNC: 433.8 MG/DL — SIGNIFICANT CHANGE UP
WBC # BLD: 7.92 K/UL — SIGNIFICANT CHANGE UP (ref 3.8–10.5)
WBC # FLD AUTO: 7.92 K/UL — SIGNIFICANT CHANGE UP (ref 3.8–10.5)
WBC UR QL: SIGNIFICANT CHANGE UP /HPF (ref 0–5)

## 2022-03-17 PROCEDURE — 93306 TTE W/DOPPLER COMPLETE: CPT | Mod: 26

## 2022-03-17 RX ORDER — HYDRALAZINE HCL 50 MG
2.5 TABLET ORAL ONCE
Refills: 0 | Status: COMPLETED | OUTPATIENT
Start: 2022-03-17 | End: 2022-03-17

## 2022-03-17 RX ORDER — WARFARIN SODIUM 2.5 MG/1
7.5 TABLET ORAL ONCE
Refills: 0 | Status: COMPLETED | OUTPATIENT
Start: 2022-03-17 | End: 2022-03-17

## 2022-03-17 RX ORDER — FUROSEMIDE 40 MG
40 TABLET ORAL
Refills: 0 | Status: DISCONTINUED | OUTPATIENT
Start: 2022-03-17 | End: 2022-03-18

## 2022-03-17 RX ADMIN — Medication 50 MILLIGRAM(S): at 17:18

## 2022-03-17 RX ADMIN — Medication 2.5 MILLIGRAM(S): at 00:11

## 2022-03-17 RX ADMIN — Medication 50 MILLIGRAM(S): at 08:39

## 2022-03-17 RX ADMIN — Medication 200 MILLIGRAM(S): at 08:39

## 2022-03-17 RX ADMIN — ATORVASTATIN CALCIUM 10 MILLIGRAM(S): 80 TABLET, FILM COATED ORAL at 21:28

## 2022-03-17 RX ADMIN — WARFARIN SODIUM 7.5 MILLIGRAM(S): 2.5 TABLET ORAL at 18:02

## 2022-03-17 RX ADMIN — Medication 40 MILLIGRAM(S): at 17:17
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
## 2022-03-17 NOTE — CHART NOTE - NSCHARTNOTEFT_GEN_A_CORE
Upon receiving patient on the unit noted that patient was given 2.5 mg IV push of Hydralazine push. I requested BP be repeated and patient was found to be 142/71 . Notified nocturnist,. Agreed to refrain from giving any antihypertensive agents. Will maintain at goal of SBP 160s for 24 hours from admission. Attending aware BP slightly below target however mentating well and denies headaches. RN advised to notify provider prior to administering antihypertensives. Will continue to monitor BP closely. RN aware and agreed to assess neurological status q 4 hours. Upon receiving patient on the unit noted that patient was given 2.5 mg IV push of Hydralazine push. I requested BP be repeated and patient was found to be 142/71 . Notified nocturnist,. Agreed to refrain from giving any antihypertensive agents but no need to increase BP at this time per attending. Will maintain at goal of SBP 160s for 24 hours from admission. Attending aware BP slightly below target however mentating well and denies headaches. RN advised to notify provider prior to administering antihypertensives. Will continue to monitor BP closely. RN aware and agreed to assess neurological status q 4 hours.

## 2022-03-17 NOTE — PROGRESS NOTE ADULT - SUBJECTIVE AND OBJECTIVE BOX
Date of service: 03/17/22    chief complaint: sob     extended hpi: 71 year old male with PMH HTN, CKD, DM, Gout, AF on Coumadin, (OP Cardio is [place]), who presented to ER from PMD where he was found to be hypertensive and admits to worsening SAENZ.  He reports chronic R knee pain that limits his exercise capacity but admits his SAENZ and LE edema has worsened over a few months.     S: no chest pain or sob; LE swelling improved; still with chronic right knee pain; ros otherwise negative.     Review of Systems:   Constitutional: [ ] fevers, [ ] chills.   Skin: [ ] dry skin. [ ] rashes.  Psychiatric: [ ] depression, [ ] anxiety.   Gastrointestinal: [ ] BRBPR, [ ] melena.   Neurological: [ ] confusion. [ ] seizures. [ ] shuffling gait.   Ears,Nose,Mouth and Throat: [ ] ear pain [ ] sore throat.   Eyes: [ ] diplopia.   Respiratory: [ ] hemoptysis. [ ] shortness of breath  Cardiovascular: See HPI above  Hematologic/Lymphatic: [ ] anemia. [ ] painful nodes. [ ] prolonged bleeding.   Genitourinary: [ ] hematuria. [ ] flank pain.   Endocrine: [ ] significant change in weight. [ ] intolerance to heat and cold.     Review of systems [x ] otherwise negative, [ ] otherwise unable to obtain    FH: no family history of sudden cardiac death in first degree relatives    SH: [ ] tobacco, [ ] alcohol, [ ] drugs    acetaminophen     Tablet .. 650 milliGRAM(s) Oral every 6 hours PRN  atorvastatin 10 milliGRAM(s) Oral at bedtime  dextrose 40% Gel 15 Gram(s) Oral once  dextrose 5%. 1000 milliLiter(s) IV Continuous <Continuous>  dextrose 5%. 1000 milliLiter(s) IV Continuous <Continuous>  dextrose 50% Injectable 25 Gram(s) IV Push once  dextrose 50% Injectable 12.5 Gram(s) IV Push once  dextrose 50% Injectable 25 Gram(s) IV Push once  furosemide   Injectable 40 milliGRAM(s) IV Push two times a day  glucagon  Injectable 1 milliGRAM(s) IntraMuscular once  hydrALAZINE 50 milliGRAM(s) Oral every 8 hours  influenza  Vaccine (HIGH DOSE) 0.7 milliLiter(s) IntraMuscular once  insulin lispro (ADMELOG) corrective regimen sliding scale   SubCutaneous three times a day before meals  insulin lispro (ADMELOG) corrective regimen sliding scale   SubCutaneous at bedtime  metoprolol succinate  milliGRAM(s) Oral daily                            12.3   7.92  )-----------( 199      ( 17 Mar 2022 07:01 )             38.5       03-17    143  |  103  |  31<H>  ----------------------------<  95  4.6   |  26  |  1.31<H>    Ca    9.9      17 Mar 2022 07:01  Phos  4.0     03-17  Mg     2.20     03-17    TPro  8.3  /  Alb  4.1  /  TBili  0.5  /  DBili  x   /  AST  21  /  ALT  21  /  AlkPhos  80  03-17            T(C): 36.7 (03-17-22 @ 12:40), Max: 37 (03-16-22 @ 23:15)  HR: 85 (03-17-22 @ 12:40) (83 - 88)  BP: 160/100 (03-17-22 @ 12:40) (142/71 - 184/96)  RR: 18 (03-17-22 @ 12:40) (17 - 19)  SpO2: 100% (03-17-22 @ 12:40) (100% - 100%)  Wt(kg): --    I&O's Summary    16 Mar 2022 07:01  -  17 Mar 2022 07:00  --------------------------------------------------------  IN: 0 mL / OUT: 1600 mL / NET: -1600 mL    17 Mar 2022 07:01  -  17 Mar 2022 16:21  --------------------------------------------------------  IN: 200 mL / OUT: 600 mL / NET: -400 mL        General: Well nourished in no acute distress. Alert and Oriented * 3.   Head: Normocephalic and atraumatic.   Neck: No JVD. No bruits. Supple. Does not appear to be enlarged.   Cardiovascular: + S1,S2 ; RRR Soft systolic murmur at the left lower sternal border. No rubs noted.    Lungs: CTA b/l. No rhonchi, rales or wheezes.   Abdomen: + BS, soft. Non tender. Non distended. No rebound. No guarding.   Extremities: + edema in LE bilaterally   Neurologic: Moves all four extremities. Full range of motion.   Skin: Warm and moist. The patient's skin has normal elasticity and good skin turgor.   Psychiatric: Appropriate mood and affect.  Musculoskeletal: Normal range of motion, normal strength    Tele: AF    TTE: < from: TTE with Doppler (w/Cont) (03.17.22 @ 11:13) >  1. Mitral annular calcification, otherwise normal mitral  valve. Mild mitral regurgitation.  2. Severely dilated left atrium.  LA volume index = 69  cc/m2.  3. Mild concentric left ventricular hypertrophy.  4. Endocardium not well visualized; grossly normal left  ventricular systolic function.  Endocardial visualization  enhanced with intravenous injection of echo contrast  (Definity).  5. Severe right atrial enlargement.  6. The right ventricle is not well visualized; grossly  normal right ventricular systolic function.  7. Estimated right ventricular systolic pressure equals 60  mm Hg, assuming right atrial pressure equals 10 mm Hg,  consistent with moderate pulmonary hypertension.    < end of copied text >      A/P: 71 year old male with PMH HTN, CKD, DM, Gout, AF on Coumadin, (OP Cardio is [place]), who presented to ER from PMD where he was found to be hypertensive and admits to worsening SAENZ.  He reports chronic R knee pain that limits his exercise capacity but admits his SAENZ and LE edema has worsened over a few months.     -pt. still volume overloaded however volume status has improved since initiation of diuretics  -continue with iv lasix to keep O > I  -TTE with normal LV function with moderate pulmonary HTN - pulm eval with Dr. Dan called  -check right knee x-ray  -continue with coumadin for goal INR 2-3    Armando Kasper MD

## 2022-03-17 NOTE — PROGRESS NOTE ADULT - SUBJECTIVE AND OBJECTIVE BOX
EP Attending    HISTORY OF PRESENT ILLNESS:   Mr Connelly is a pleasant 71yoM who presents to the ED from his primary care doctor's office for concern of severely elevated blood pressure today, and worsening shortness of breath for 2+ weeks.  He was previously able to walk a few blocks without stopping to catch his breath, now cannot walk one block.  States he has had AFib for ~4 years, takes Warfarin, with a goal INR of 2-3.  He has hypertension and CKD.  Has intentionally lost ~25 lbs.  No orthopnea, sleeps on his side with 2 pillows (stable).  No fainting or lightheadedness.   He has never used supplemental oxygen.  No prior blood clots, no prior smoking beyond age 16.  A 10 pt ROS is otherwise negative.    Date of service 3/17- Resting comfortably.  Echo done today, patient awaiting results.  No new complaints.    acetaminophen     Tablet .. 650 milliGRAM(s) Oral every 6 hours PRN  atorvastatin 10 milliGRAM(s) Oral at bedtime  dextrose 40% Gel 15 Gram(s) Oral once  dextrose 5%. 1000 milliLiter(s) IV Continuous <Continuous>  dextrose 5%. 1000 milliLiter(s) IV Continuous <Continuous>  dextrose 50% Injectable 25 Gram(s) IV Push once  dextrose 50% Injectable 12.5 Gram(s) IV Push once  dextrose 50% Injectable 25 Gram(s) IV Push once  furosemide   Injectable 40 milliGRAM(s) IV Push daily  glucagon  Injectable 1 milliGRAM(s) IntraMuscular once  hydrALAZINE 50 milliGRAM(s) Oral every 8 hours  influenza  Vaccine (HIGH DOSE) 0.7 milliLiter(s) IntraMuscular once  insulin lispro (ADMELOG) corrective regimen sliding scale   SubCutaneous three times a day before meals  insulin lispro (ADMELOG) corrective regimen sliding scale   SubCutaneous at bedtime  metoprolol succinate  milliGRAM(s) Oral daily                            12.3   7.92  )-----------( 199      ( 17 Mar 2022 07:01 )             38.5       03-17    143  |  103  |  31<H>  ----------------------------<  95  4.6   |  26  |  1.31<H>    Ca    9.9      17 Mar 2022 07:01  Phos  4.0     03-17  Mg     2.20     03-17    TPro  8.3  /  Alb  4.1  /  TBili  0.5  /  DBili  x   /  AST  21  /  ALT  21  /  AlkPhos  80  03-17    T(C): 36.7 (03-17-22 @ 12:40), Max: 37 (03-16-22 @ 23:15)  HR: 85 (03-17-22 @ 12:40) (83 - 88)  BP: 160/100 (03-17-22 @ 12:40) (142/71 - 186/101)  RR: 18 (03-17-22 @ 12:40) (17 - 19)  SpO2: 100% (03-17-22 @ 12:40) (100% - 100%)  Wt(kg): --    I&O's Summary    16 Mar 2022 07:01  -  17 Mar 2022 07:00  --------------------------------------------------------  IN: 0 mL / OUT: 1600 mL / NET: -1600 mL    17 Mar 2022 07:01  -  17 Mar 2022 14:38  --------------------------------------------------------  IN: 200 mL / OUT: 600 mL / NET: -400 mL    Appearance: pleasant adult male in no acute distress, nondiaphoretic, cooperative.  HEENT:   Normal oral mucosa, PERRL, EOMI	  Lymphatic: No lymphadenopathy , trace ankle edema BL  Cardiovascular: irregular rhythm / normal rate, S1 S2, visible JVD, No murmurs , Peripheral pulses palpable 2+ bilaterally  Respiratory: Lungs clear to auscultation, normal effort but tachypneic 	  Gastrointestinal:  Soft, Non-tender, + BS	  Skin: No rashes, No ecchymoses, No cyanosis, warm to touch  Musculoskeletal: Normal range of motion, normal strength  Psychiatry:  Mood & affect appropriate    TELEMETRY: AF rate controlled  ECG:  AF, no R waves out to V3 / pattern of prior anteroseptal MI.	  Echo:   < from: TTE with Doppler (w/Cont) (03.17.22 @ 11:13) >  DIMENSIONS:  Dimensions:     Normal Values:  LA:     4.9 cm    2.0 - 4.0 cm  Ao:     3.4 cm    2.0 - 3.8 cm  SEPTUM: 1.4 cm    0.6 - 1.2 cm  PWT:    1.5 cm    0.6 - 1.1 cm  LVIDd:  4.8 cm    3.0 - 5.6 cm  LVIDs:  3.0 cm    1.8 - 4.0 cm  Derived Variables:  LVMI: 114 g/m2  RWT: 0.62  Fractional short: 38 %  Ejection Fraction (Modified Brown Rule): 67 %  ------------------------------------------------------------------------  OBSERVATIONS:  Mitral Valve: Mitral annular calcification, otherwise  normal mitral valve. Mild mitral regurgitation.  Aortic Root: Normal aortic root.  Aortic Valve: Calcified trileaflet aortic valve with normal  opening.  Left Atrium: Severely dilated left atrium.  LA volume index  = 69 cc/m2.  Left Ventricle: Endocardium not well visualized; grossly  normal left ventricular systolic function.  Endocardial  visualization enhanced with intravenous injection of echo  contrast (Definity). Mild concentric left ventricular  hypertrophy.  Right Heart: Severe right atrial enlargement. The right  ventricle is not well visualized; grossly normal right  ventricular systolic function. Normal tricuspid valve.  Mild-moderate tricuspid regurgitation. Normal pulmonic  valve.  Minimal pulmonic regurgitation.  Pericardium/PleuraNormal pericardium with no pericardial  effusion.  Hemodynamic: Estimated right ventricular systolic pressure  equals 60 mm Hg, assuming right atrial pressure equals 10  mm Hg, consistent with moderate pulmonary hypertension.    < end of copied text >        ASSESSMENT/PLAN: 	71y Male with chronic AFib here with signs of diastolic CHF and right heart failure (JVD, edema, abd distension, SOB on exertion).    Maintain telemetry.  Maintain HR control with beta blockers as prescribed in outpatient setting.  Maintain Warfarin for goal INR of 2-3.  As an outpatient he could transition to Apixaban 5mg BID.  Echocardiogram with normal EF, LVH, bi-atrial enlargement and PHTN.  ? Diastolic CHF vs other causes of PHTN?  Blood pressure remains elevated today.  Diuresis ongoing with 40mg IV lasix daily.  Will change this to BID.  Maintain  K4-4.5, Mg 2.  Will follow.      Minor Tolbert M.D.  Cardiac Electrophysiology    office [phone]  pager [phone]

## 2022-03-18 ENCOUNTER — TRANSCRIPTION ENCOUNTER (OUTPATIENT)
Age: 72
End: 2022-03-18

## 2022-03-18 DIAGNOSIS — M10.9 GOUT, UNSPECIFIED: ICD-10-CM

## 2022-03-18 DIAGNOSIS — I27.22 PULMONARY HYPERTENSION DUE TO LEFT HEART DISEASE: ICD-10-CM

## 2022-03-18 LAB
ALDOST SERPL-MCNC: 8.6 NG/DL — SIGNIFICANT CHANGE UP
ANION GAP SERPL CALC-SCNC: 12 MMOL/L — SIGNIFICANT CHANGE UP (ref 7–14)
APTT BLD: 36 SEC — SIGNIFICANT CHANGE UP (ref 27–36.3)
BUN SERPL-MCNC: 30 MG/DL — HIGH (ref 7–23)
CALCIUM SERPL-MCNC: 9.5 MG/DL — SIGNIFICANT CHANGE UP (ref 8.4–10.5)
CHLORIDE SERPL-SCNC: 103 MMOL/L — SIGNIFICANT CHANGE UP (ref 98–107)
CO2 SERPL-SCNC: 27 MMOL/L — SIGNIFICANT CHANGE UP (ref 22–31)
CREAT SERPL-MCNC: 1.45 MG/DL — HIGH (ref 0.5–1.3)
EGFR: 51 ML/MIN/1.73M2 — LOW
GLUCOSE BLDC GLUCOMTR-MCNC: 101 MG/DL — HIGH (ref 70–99)
GLUCOSE BLDC GLUCOMTR-MCNC: 108 MG/DL — HIGH (ref 70–99)
GLUCOSE BLDC GLUCOMTR-MCNC: 110 MG/DL — HIGH (ref 70–99)
GLUCOSE BLDC GLUCOMTR-MCNC: 201 MG/DL — HIGH (ref 70–99)
GLUCOSE BLDC GLUCOMTR-MCNC: 263 MG/DL — HIGH (ref 70–99)
GLUCOSE BLDC GLUCOMTR-MCNC: 98 MG/DL — SIGNIFICANT CHANGE UP (ref 70–99)
GLUCOSE SERPL-MCNC: 97 MG/DL — SIGNIFICANT CHANGE UP (ref 70–99)
HCT VFR BLD CALC: 39 % — SIGNIFICANT CHANGE UP (ref 39–50)
HGB BLD-MCNC: 12.7 G/DL — LOW (ref 13–17)
INR BLD: 2.37 RATIO — HIGH (ref 0.88–1.16)
MAGNESIUM SERPL-MCNC: 2.1 MG/DL — SIGNIFICANT CHANGE UP (ref 1.6–2.6)
MCHC RBC-ENTMCNC: 26.5 PG — LOW (ref 27–34)
MCHC RBC-ENTMCNC: 32.6 GM/DL — SIGNIFICANT CHANGE UP (ref 32–36)
MCV RBC AUTO: 81.3 FL — SIGNIFICANT CHANGE UP (ref 80–100)
NRBC # BLD: 0 /100 WBCS — SIGNIFICANT CHANGE UP
NRBC # FLD: 0 K/UL — SIGNIFICANT CHANGE UP
PHOSPHATE SERPL-MCNC: 4.4 MG/DL — SIGNIFICANT CHANGE UP (ref 2.5–4.5)
PLATELET # BLD AUTO: 204 K/UL — SIGNIFICANT CHANGE UP (ref 150–400)
POTASSIUM SERPL-MCNC: 4.4 MMOL/L — SIGNIFICANT CHANGE UP (ref 3.5–5.3)
POTASSIUM SERPL-SCNC: 4.4 MMOL/L — SIGNIFICANT CHANGE UP (ref 3.5–5.3)
PROTHROM AB SERPL-ACNC: 27.7 SEC — HIGH (ref 10.5–13.4)
RBC # BLD: 4.8 M/UL — SIGNIFICANT CHANGE UP (ref 4.2–5.8)
RBC # FLD: 18.1 % — HIGH (ref 10.3–14.5)
RENIN DIRECT, PLASMA: <2.1 PG/ML — SIGNIFICANT CHANGE UP
SODIUM SERPL-SCNC: 142 MMOL/L — SIGNIFICANT CHANGE UP (ref 135–145)
WBC # BLD: 7.61 K/UL — SIGNIFICANT CHANGE UP (ref 3.8–10.5)
WBC # FLD AUTO: 7.61 K/UL — SIGNIFICANT CHANGE UP (ref 3.8–10.5)

## 2022-03-18 PROCEDURE — 73560 X-RAY EXAM OF KNEE 1 OR 2: CPT | Mod: 26,RT

## 2022-03-18 RX ORDER — FUROSEMIDE 40 MG
40 TABLET ORAL
Refills: 0 | Status: DISCONTINUED | OUTPATIENT
Start: 2022-03-19 | End: 2022-03-21

## 2022-03-18 RX ORDER — AMLODIPINE BESYLATE 2.5 MG/1
5 TABLET ORAL DAILY
Refills: 0 | Status: DISCONTINUED | OUTPATIENT
Start: 2022-03-18 | End: 2022-03-21

## 2022-03-18 RX ORDER — WARFARIN SODIUM 2.5 MG/1
7.5 TABLET ORAL ONCE
Refills: 0 | Status: COMPLETED | OUTPATIENT
Start: 2022-03-18 | End: 2022-03-18

## 2022-03-18 RX ADMIN — ATORVASTATIN CALCIUM 10 MILLIGRAM(S): 80 TABLET, FILM COATED ORAL at 22:35

## 2022-03-18 RX ADMIN — Medication 50 MILLIGRAM(S): at 09:28

## 2022-03-18 RX ADMIN — Medication 40 MILLIGRAM(S): at 17:12

## 2022-03-18 RX ADMIN — Medication 40 MILLIGRAM(S): at 05:02

## 2022-03-18 RX ADMIN — Medication 200 MILLIGRAM(S): at 05:11

## 2022-03-18 RX ADMIN — Medication 50 MILLIGRAM(S): at 17:13

## 2022-03-18 RX ADMIN — AMLODIPINE BESYLATE 5 MILLIGRAM(S): 2.5 TABLET ORAL at 18:37

## 2022-03-18 RX ADMIN — WARFARIN SODIUM 7.5 MILLIGRAM(S): 2.5 TABLET ORAL at 17:11

## 2022-03-18 RX ADMIN — Medication 50 MILLIGRAM(S): at 01:09

## 2022-03-18 RX ADMIN — Medication 30 MILLIGRAM(S): at 13:15

## 2022-03-18 NOTE — PROGRESS NOTE ADULT - ASSESSMENT
70 y/o M with pmhx of HTN, CHF, afib, DM presented to the ED for HTN from clinic. 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 Admit for HTN emergency, RERE and ACS work up.

## 2022-03-18 NOTE — PROGRESS NOTE ADULT - REASON FOR ADMISSION
Patient currently smoking 2 packs per day and will begin 1:1 cessation therapy with CTTS. Patient will begin prescribed tobacco cessation medication regimen of starter pack of Chantix, along with 2mg nicotine lozenge as directed. HTN emergency
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
## 2022-03-18 NOTE — PROGRESS NOTE ADULT - SUBJECTIVE AND OBJECTIVE BOX
EP ATTENDING    tele: rate controlled AF    he denies palpitations, syncope, nor angina, ROS otherwise -      DATE OF SERVICE - 03-18-22 @ 12:10    Review of Systems:   Constitutional: [ ] fevers, [ ] chills.   Skin: [ ] dry skin. [ ] rashes.  Psychiatric: [ ] depression, [ ] anxiety.   Gastrointestinal: [ ] BRBPR, [ ] melena.   Neurological: [ ] confusion. [ ] seizures. [ ] shuffling gait.   Ears,Nose,Mouth and Throat: [ ] ear pain [ ] sore throat.   Eyes: [ ] diplopia.   Respiratory: [ ] hemoptysis. [ ] shortness of breath  Cardiovascular: See HPI above  Hematologic/Lymphatic: [ ] anemia. [ ] painful nodes. [ ] prolonged bleeding.   Genitourinary: [ ] hematuria. [ ] flank pain.   Endocrine: [ ] significant change in weight. [ ] intolerance to heat and cold.     Review of systems [ x] otherwise negative, [ ] otherwise unable to obtain    FH: no family history of sudden cardiac death in first degree relatives    SH: [ ] tobacco, [ ] alcohol, [ ] drugs    acetaminophen     Tablet .. 650 milliGRAM(s) Oral every 6 hours PRN  atorvastatin 10 milliGRAM(s) Oral at bedtime  dextrose 40% Gel 15 Gram(s) Oral once  dextrose 5%. 1000 milliLiter(s) IV Continuous <Continuous>  dextrose 5%. 1000 milliLiter(s) IV Continuous <Continuous>  dextrose 50% Injectable 25 Gram(s) IV Push once  dextrose 50% Injectable 12.5 Gram(s) IV Push once  dextrose 50% Injectable 25 Gram(s) IV Push once  furosemide   Injectable 40 milliGRAM(s) IV Push two times a day  glucagon  Injectable 1 milliGRAM(s) IntraMuscular once  hydrALAZINE 50 milliGRAM(s) Oral every 8 hours  influenza  Vaccine (HIGH DOSE) 0.7 milliLiter(s) IntraMuscular once  insulin lispro (ADMELOG) corrective regimen sliding scale   SubCutaneous three times a day before meals  insulin lispro (ADMELOG) corrective regimen sliding scale   SubCutaneous at bedtime  metoprolol succinate  milliGRAM(s) Oral daily  warfarin 7.5 milliGRAM(s) Oral once                            12.7   7.61  )-----------( 204      ( 18 Mar 2022 06:32 )             39.0       03-18    142  |  103  |  30<H>  ----------------------------<  97  4.4   |  27  |  1.45<H>    Ca    9.5      18 Mar 2022 06:32  Phos  4.4     03-18  Mg     2.10     03-18    TPro  8.3  /  Alb  4.1  /  TBili  0.5  /  DBili  x   /  AST  21  /  ALT  21  /  AlkPhos  80  03-17            T(C): 36.8 (03-18-22 @ 09:29), Max: 37 (03-18-22 @ 01:10)  HR: 72 (03-18-22 @ 09:29) (65 - 85)  BP: 148/90 (03-18-22 @ 09:29) (148/90 - 174/100)  RR: 18 (03-18-22 @ 09:29) (18 - 18)  SpO2: 100% (03-18-22 @ 09:29) (100% - 100%)  Wt(kg): --    I&O's Summary    17 Mar 2022 07:01  -  18 Mar 2022 07:00  --------------------------------------------------------  IN: 400 mL / OUT: 1950 mL / NET: -1550 mL        General: Well nourished in no acute distress. Alert and Oriented * 3.   Head: Normocephalic and atraumatic.   Neck: No JVD. No bruits. Supple. Does not appear to be enlarged.   Cardiovascular: + S1,S2 ; IRR Soft systolic murmur at the left lower sternal border. No rubs noted.    Lungs: CTA b/l. No rhonchi, rales or wheezes.   Abdomen: + BS, soft. Non tender. Non distended. No rebound. No guarding.   Extremities: No clubbing/cyanosis/edema.   Neurologic: Moves all four extremities. Full range of motion.   Skin: Warm and moist. The patient's skin has normal elasticity and good skin turgor.   Psychiatric: Appropriate mood and affect.  Musculoskeletal: Normal range of motion, normal strength      Echo: moderate pulmonary HTN, otherwise unremarkable      A/P) 72 y/o male PMH HTN, mild CKD, DM, gout, AF (unclear if paroxysmal or persistent) now a/w uncontrolled hypertension from his PMD's office ([facility]). His cardiologist (Valdo Crowley) manages his AF with toprol 200 and coumadin. EP now called for help with AF rate control    -continue lifelong a/c  -management of hypertension as per cardiology  -continue toprol xl 200mg daily  -no further inpatient EP workup needed  -if there is any further AF with RVR then next step would be adding dig 0.125mg daily  -f/u with Carline after discharge      Nia Garrett M.D., [facility]  Cardiac Electrophysiology  [facility]  [address] [place] [address], [place] [address]  [email or web address]    office [phone]  pager [phone]

## 2022-03-18 NOTE — CONSULT NOTE ADULT - ASSESSMENT
This is a 70 y/o M with pmhx of HTN, CHF, afib, DM presented to the ED for HTN emergency that was coupled with acute systolic heart failure   Morbid obesity   CKD stage 3a likely from HTN   Moderate PHTN that could be MAREN    1 Renal-Volume status looks great.  Change lasix to PO in am   Trend serum creatinine  Check UA and quantify proteinuria   2 CVS-Start Norvasc 5 mg po qd which can help lower Pulm pressures as well  3 Pulm-Outpt sleep study     [facility]   [phone] 
71 with Type II PHTN, HTN, DM, afib
70 y/o M with pmhx of HTN, CHF, afib, DM presented to the ED for HTN from clinic. 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 Admit for HTN emergency, RERE and ACS work up.

## 2022-03-18 NOTE — PROGRESS NOTE ADULT - SUBJECTIVE AND OBJECTIVE BOX
Date of service: 03/18/22    chief complaint: sob     extended hpi: 71 year old male with PMH HTN, CKD, DM, Gout, AF on Coumadin, (OP Cardio is [facility]), who presented to ER from PMD where he was found to be hypertensive and admits to worsening SAENZ.  He reports chronic R knee pain that limits his exercise capacity but admits his SAENZ and LE edema has worsened over a few months.     S: no chest pain or sob; LE swelling improved; still with right knee pain but it is improved; ros otherwise negative.     Review of Systems:   Constitutional: [ ] fevers, [ ] chills.   Skin: [ ] dry skin. [ ] rashes.  Psychiatric: [ ] depression, [ ] anxiety.   Gastrointestinal: [ ] BRBPR, [ ] melena.   Neurological: [ ] confusion. [ ] seizures. [ ] shuffling gait.   Ears,Nose,Mouth and Throat: [ ] ear pain [ ] sore throat.   Eyes: [ ] diplopia.   Respiratory: [ ] hemoptysis. [ ] shortness of breath  Cardiovascular: See HPI above  Hematologic/Lymphatic: [ ] anemia. [ ] painful nodes. [ ] prolonged bleeding.   Genitourinary: [ ] hematuria. [ ] flank pain.   Endocrine: [ ] significant change in weight. [ ] intolerance to heat and cold.     Review of systems [x ] otherwise negative, [ ] otherwise unable to obtain    FH: no family history of sudden cardiac death in first degree relatives    SH: [ ] tobacco, [ ] alcohol, [ ] drugs        acetaminophen     Tablet .. 650 milliGRAM(s) Oral every 6 hours PRN  atorvastatin 10 milliGRAM(s) Oral at bedtime  dextrose 40% Gel 15 Gram(s) Oral once  dextrose 5%. 1000 milliLiter(s) IV Continuous <Continuous>  dextrose 5%. 1000 milliLiter(s) IV Continuous <Continuous>  dextrose 50% Injectable 25 Gram(s) IV Push once  dextrose 50% Injectable 12.5 Gram(s) IV Push once  dextrose 50% Injectable 25 Gram(s) IV Push once  furosemide   Injectable 40 milliGRAM(s) IV Push two times a day  glucagon  Injectable 1 milliGRAM(s) IntraMuscular once  hydrALAZINE 50 milliGRAM(s) Oral every 8 hours  influenza  Vaccine (HIGH DOSE) 0.7 milliLiter(s) IntraMuscular once  insulin lispro (ADMELOG) corrective regimen sliding scale   SubCutaneous three times a day before meals  insulin lispro (ADMELOG) corrective regimen sliding scale   SubCutaneous at bedtime  metoprolol succinate  milliGRAM(s) Oral daily  warfarin 7.5 milliGRAM(s) Oral once                            12.7   7.61  )-----------( 204      ( 18 Mar 2022 06:32 )             39.0       03-18    142  |  103  |  30<H>  ----------------------------<  97  4.4   |  27  |  1.45<H>    Ca    9.5      18 Mar 2022 06:32  Phos  4.4     03-18  Mg     2.10     03-18    TPro  8.3  /  Alb  4.1  /  TBili  0.5  /  DBili  x   /  AST  21  /  ALT  21  /  AlkPhos  80  03-17            T(C): 37 (03-18-22 @ 13:15), Max: 37 (03-18-22 @ 01:10)  HR: 88 (03-18-22 @ 13:15) (65 - 88)  BP: 152/84 (03-18-22 @ 13:15) (148/90 - 174/100)  RR: 18 (03-18-22 @ 13:15) (18 - 18)  SpO2: 100% (03-18-22 @ 13:15) (100% - 100%)  Wt(kg): --    I&O's Summary    17 Mar 2022 07:01  -  18 Mar 2022 07:00  --------------------------------------------------------  IN: 400 mL / OUT: 1950 mL / NET: -1550 mL          General: Well nourished in no acute distress. Alert and Oriented * 3.   Head: Normocephalic and atraumatic.   Neck: No JVD. No bruits. Supple. Does not appear to be enlarged.   Cardiovascular: + S1,S2 ; RRR Soft systolic murmur at the left lower sternal border. No rubs noted.    Lungs: CTA b/l. No rhonchi, rales or wheezes.   Abdomen: + BS, soft. Non tender. Non distended. No rebound. No guarding.   Extremities: + edema in LE bilaterally   Neurologic: Moves all four extremities. Full range of motion.   Skin: Warm and moist. The patient's skin has normal elasticity and good skin turgor.   Psychiatric: Appropriate mood and affect.  Musculoskeletal: Normal range of motion, normal strength    Tele: AF    TTE: < from: TTE with Doppler (w/Cont) (03.17.22 @ 11:13) >  1. Mitral annular calcification, otherwise normal mitral  valve. Mild mitral regurgitation.  2. Severely dilated left atrium.  LA volume index = 69  cc/m2.  3. Mild concentric left ventricular hypertrophy.  4. Endocardium not well visualized; grossly normal left  ventricular systolic function.  Endocardial visualization  enhanced with intravenous injection of echo contrast  (Definity).  5. Severe right atrial enlargement.  6. The right ventricle is not well visualized; grossly  normal right ventricular systolic function.  7. Estimated right ventricular systolic pressure equals 60  mm Hg, assuming right atrial pressure equals 10 mm Hg,  consistent with moderate pulmonary hypertension.    < end of copied text >      A/P: 71 year old male with PMH HTN, CKD, DM, Gout, AF on Coumadin, [facility] who presented to ER from PMD where he was found to be hypertensive and admits to worsening SAENZ.  He reports chronic R knee pain that limits his exercise capacity but admits his SAENZ and LE edema has worsened over a few months.     -pt. still volume overloaded however volume status has improved since initiation of diuretics  -continue with iv lasix to keep O > I  -TTE with normal LV function with moderate pulmonary HTN - pulm eval with Dr. Dan called - follow up recommendations   -right knee x-ray and medicine eval noted - solumedrol per medicine   -renal eval with Dr. Urrutia called  -continue with coumadin for goal INR 2-3    Armando Kasper MD    Date of service: 03/18/22    chief complaint: sob     extended hpi: 71 year old male with PMH HTN, CKD, DM, Gout, AF on Coumadin, (OP Cardio is [facility]), who presented to ER from PMD where he was found to be hypertensive and admits to worsening SAENZ.  He reports chronic R knee pain that limits his exercise capacity but admits his SAENZ and LE edema has worsened over a few months.     S: no chest pain or sob; LE swelling improved; still with right knee pain but it is improved; ros otherwise negative.     Review of Systems:   Constitutional: [ ] fevers, [ ] chills.   Skin: [ ] dry skin. [ ] rashes.  Psychiatric: [ ] depression, [ ] anxiety.   Gastrointestinal: [ ] BRBPR, [ ] melena.   Neurological: [ ] confusion. [ ] seizures. [ ] shuffling gait.   Ears,Nose,Mouth and Throat: [ ] ear pain [ ] sore throat.   Eyes: [ ] diplopia.   Respiratory: [ ] hemoptysis. [ ] shortness of breath  Cardiovascular: See HPI above  Hematologic/Lymphatic: [ ] anemia. [ ] painful nodes. [ ] prolonged bleeding.   Genitourinary: [ ] hematuria. [ ] flank pain.   Endocrine: [ ] significant change in weight. [ ] intolerance to heat and cold.     Review of systems [x ] otherwise negative, [ ] otherwise unable to obtain    FH: no family history of sudden cardiac death in first degree relatives    SH: [ ] tobacco, [ ] alcohol, [ ] drugs        acetaminophen     Tablet .. 650 milliGRAM(s) Oral every 6 hours PRN  atorvastatin 10 milliGRAM(s) Oral at bedtime  dextrose 40% Gel 15 Gram(s) Oral once  dextrose 5%. 1000 milliLiter(s) IV Continuous <Continuous>  dextrose 5%. 1000 milliLiter(s) IV Continuous <Continuous>  dextrose 50% Injectable 25 Gram(s) IV Push once  dextrose 50% Injectable 12.5 Gram(s) IV Push once  dextrose 50% Injectable 25 Gram(s) IV Push once  furosemide   Injectable 40 milliGRAM(s) IV Push two times a day  glucagon  Injectable 1 milliGRAM(s) IntraMuscular once  hydrALAZINE 50 milliGRAM(s) Oral every 8 hours  influenza  Vaccine (HIGH DOSE) 0.7 milliLiter(s) IntraMuscular once  insulin lispro (ADMELOG) corrective regimen sliding scale   SubCutaneous three times a day before meals  insulin lispro (ADMELOG) corrective regimen sliding scale   SubCutaneous at bedtime  metoprolol succinate  milliGRAM(s) Oral daily  warfarin 7.5 milliGRAM(s) Oral once                            12.7   7.61  )-----------( 204      ( 18 Mar 2022 06:32 )             39.0       03-18    142  |  103  |  30<H>  ----------------------------<  97  4.4   |  27  |  1.45<H>    Ca    9.5      18 Mar 2022 06:32  Phos  4.4     03-18  Mg     2.10     03-18    TPro  8.3  /  Alb  4.1  /  TBili  0.5  /  DBili  x   /  AST  21  /  ALT  21  /  AlkPhos  80  03-17            T(C): 37 (03-18-22 @ 13:15), Max: 37 (03-18-22 @ 01:10)  HR: 88 (03-18-22 @ 13:15) (65 - 88)  BP: 152/84 (03-18-22 @ 13:15) (148/90 - 174/100)  RR: 18 (03-18-22 @ 13:15) (18 - 18)  SpO2: 100% (03-18-22 @ 13:15) (100% - 100%)  Wt(kg): --    I&O's Summary    17 Mar 2022 07:01  -  18 Mar 2022 07:00  --------------------------------------------------------  IN: 400 mL / OUT: 1950 mL / NET: -1550 mL          General: Well nourished in no acute distress. Alert and Oriented * 3.   Head: Normocephalic and atraumatic.   Neck: No JVD. No bruits. Supple. Does not appear to be enlarged.   Cardiovascular: + S1,S2 ; RRR Soft systolic murmur at the left lower sternal border. No rubs noted.    Lungs: CTA b/l. No rhonchi, rales or wheezes.   Abdomen: + BS, soft. Non tender. Non distended. No rebound. No guarding.   Extremities: + edema in LE bilaterally   Neurologic: Moves all four extremities. Full range of motion.   Skin: Warm and moist. The patient's skin has normal elasticity and good skin turgor.   Psychiatric: Appropriate mood and affect.  Musculoskeletal: Normal range of motion, normal strength    Tele: AF    TTE: < from: TTE with Doppler (w/Cont) (03.17.22 @ 11:13) >  1. Mitral annular calcification, otherwise normal mitral  valve. Mild mitral regurgitation.  2. Severely dilated left atrium.  LA volume index = 69  cc/m2.  3. Mild concentric left ventricular hypertrophy.  4. Endocardium not well visualized; grossly normal left  ventricular systolic function.  Endocardial visualization  enhanced with intravenous injection of echo contrast  (Definity).  5. Severe right atrial enlargement.  6. The right ventricle is not well visualized; grossly  normal right ventricular systolic function.  7. Estimated right ventricular systolic pressure equals 60  mm Hg, assuming right atrial pressure equals 10 mm Hg,  consistent with moderate pulmonary hypertension.    < end of copied text >      A/P: 71 year old male with PMH HTN, CKD, DM, Gout, AF on Coumadin, (OP Cardio is [facility]), who presented to ER from PMD where he was found to be hypertensive and admits to worsening SAENZ.  He reports chronic R knee pain that limits his exercise capacity but admits his SAENZ and LE edema has worsened over a few months.     -pt. still volume overloaded however volume status has improved since initiation of diuretics  -continue with iv lasix to keep O > I  -TTE with normal LV function with moderate pulmonary HTN - pulm eval with Dr. Dan called - follow up recommendations   -right knee x-ray and medicine eval noted - solumedrol per medicine   -renal eval with Dr. Urrutia called  -check NST to evaluate SAENZ   -continue with coumadin for goal INR 2-3    Armando Kasper MD

## 2022-03-18 NOTE — CONSULT NOTE ADULT - PROBLEM SELECTOR RECOMMENDATION 9
much improved  will continue to monitor   avoid drastic reduction to avoid kidney function deterioration  continue to monitor   on metoprolol 200 XL qd, furosemide 4o IV daily and hydralazine 50 q 8
as per cards
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
## 2022-03-18 NOTE — CONSULT NOTE ADULT - PROBLEM SELECTOR RECOMMENDATION 2
Enlarged LA  Hypervolemic at present  Type II PHTN very likely  Can consider RHC when euvolemic but would start by controlling BP and repeating TTE when more euvolemic as outpt
unclear baseline  creatinine consistent with stage 3A disease  will continue to monitor  creatinine 2014 was 0.7

## 2022-03-18 NOTE — PROGRESS NOTE ADULT - SUBJECTIVE AND OBJECTIVE BOX
Patient is a 71y old  Male who presents with a chief complaint of HTN emergency (18 Mar 2022 12:09)      DATE OF SERVICE: 22 @ 12:17    SUBJECTIVE / OVERNIGHT EVENTS: overnight events noted    ROS:  Resp: No cough no sputum production  CVS: No chest pain no palpitations no orthopnea  GI: no N/V/D  : no dysuria, no hematuria  MSK: right knee pain and swelling           MEDICATIONS  (STANDING):  atorvastatin 10 milliGRAM(s) Oral at bedtime  dextrose 40% Gel 15 Gram(s) Oral once  dextrose 5%. 1000 milliLiter(s) (50 mL/Hr) IV Continuous <Continuous>  dextrose 5%. 1000 milliLiter(s) (100 mL/Hr) IV Continuous <Continuous>  dextrose 50% Injectable 25 Gram(s) IV Push once  dextrose 50% Injectable 12.5 Gram(s) IV Push once  dextrose 50% Injectable 25 Gram(s) IV Push once  furosemide   Injectable 40 milliGRAM(s) IV Push two times a day  glucagon  Injectable 1 milliGRAM(s) IntraMuscular once  hydrALAZINE 50 milliGRAM(s) Oral every 8 hours  influenza  Vaccine (HIGH DOSE) 0.7 milliLiter(s) IntraMuscular once  insulin lispro (ADMELOG) corrective regimen sliding scale   SubCutaneous three times a day before meals  insulin lispro (ADMELOG) corrective regimen sliding scale   SubCutaneous at bedtime  metoprolol succinate  milliGRAM(s) Oral daily  warfarin 7.5 milliGRAM(s) Oral once    MEDICATIONS  (PRN):  acetaminophen     Tablet .. 650 milliGRAM(s) Oral every 6 hours PRN Temp greater or equal to 38C (100.4F), Mild Pain (1 - 3), Moderate Pain (4 - 6)        CAPILLARY BLOOD GLUCOSE      POCT Blood Glucose.: 110 mg/dL (18 Mar 2022 08:36)  POCT Blood Glucose.: 98 mg/dL (18 Mar 2022 07:16)  POCT Blood Glucose.: 107 mg/dL (17 Mar 2022 21:43)  POCT Blood Glucose.: 117 mg/dL (17 Mar 2022 17:47)    I&O's Summary    17 Mar 2022 07:01  -  18 Mar 2022 07:00  --------------------------------------------------------  IN: 400 mL / OUT: 1950 mL / NET: -1550 mL        Vital Signs Last 24 Hrs  T(C): 36.8 (18 Mar 2022 09:29), Max: 37 (18 Mar 2022 01:10)  T(F): 98.3 (18 Mar 2022 09:29), Max: 98.6 (18 Mar 2022 01:10)  HR: 72 (18 Mar 2022 09:29) (65 - 85)  BP: 148/90 (18 Mar 2022 09:29) (148/90 - 174/100)  BP(mean): --  RR: 18 (18 Mar 2022 09:29) (18 - 18)  SpO2: 100% (18 Mar 2022 09:) (100% - 100%)    PHYSICAL EXAM:  GENERAL: in no apparent distress  HEAD:  Atraumatic, Normocephalic  EYES: EOMI, PERRLA, sclera clear  NECK: Supple, No JVD  CHEST/LUNG: clear b/l, no wheeze  HEART: S1 S2; no murmurs appreciated  ABDOMEN: Soft, Nontender, Bowel sounds present  EXTREMITIES:  trace bilateral edema  NEUROLOGY: AO x 3 non-focal  SKIN: No rashes or lesions  MSK: right knee boggy swelling with warmth  ROM limited    LABS:                        12.7   7.61  )-----------( 204      ( 18 Mar 2022 06:32 )             39.0     03-18    142  |  103  |  30<H>  ----------------------------<  97  4.4   |  27  |  1.45<H>    Ca    9.5      18 Mar 2022 06:32  Phos  4.4     03-18  Mg     2.10     03-18    TPro  8.3  /  Alb  4.1  /  TBili  0.5  /  DBili  x   /  AST  21  /  ALT  21  /  AlkPhos  80  03-17    PT/INR - ( 18 Mar 2022 06:32 )   PT: 27.7 sec;   INR: 2.37 ratio         PTT - ( 18 Mar 2022 06:32 )  PTT:36.0 sec      Urinalysis Basic - ( 17 Mar 2022 07:37 )    Color: Light Yellow / Appearance: Clear / S.010 / pH: x  Gluc: x / Ketone: Negative  / Bili: Negative / Urobili: <2 mg/dL   Blood: x / Protein: 30 mg/dL / Nitrite: Negative   Leuk Esterase: Negative / RBC: x / WBC 0-2 /HPF   Sq Epi: x / Non Sq Epi: x / Bacteria: x          All consultant(s) notes reviewed and care discussed with other providers        Contact Number, Dr Holder [phone]

## 2022-03-19 LAB
ANION GAP SERPL CALC-SCNC: 15 MMOL/L — HIGH (ref 7–14)
BUN SERPL-MCNC: 38 MG/DL — HIGH (ref 7–23)
CALCIUM SERPL-MCNC: 9 MG/DL — SIGNIFICANT CHANGE UP (ref 8.4–10.5)
CHLORIDE SERPL-SCNC: 101 MMOL/L — SIGNIFICANT CHANGE UP (ref 98–107)
CO2 SERPL-SCNC: 25 MMOL/L — SIGNIFICANT CHANGE UP (ref 22–31)
CREAT SERPL-MCNC: 1.46 MG/DL — HIGH (ref 0.5–1.3)
EGFR: 51 ML/MIN/1.73M2 — LOW
GLUCOSE BLDC GLUCOMTR-MCNC: 111 MG/DL — HIGH (ref 70–99)
GLUCOSE BLDC GLUCOMTR-MCNC: 119 MG/DL — HIGH (ref 70–99)
GLUCOSE BLDC GLUCOMTR-MCNC: 140 MG/DL — HIGH (ref 70–99)
GLUCOSE BLDC GLUCOMTR-MCNC: 177 MG/DL — HIGH (ref 70–99)
GLUCOSE SERPL-MCNC: 118 MG/DL — HIGH (ref 70–99)
HCT VFR BLD CALC: 37.9 % — LOW (ref 39–50)
HCT VFR BLD CALC: 40.4 % — SIGNIFICANT CHANGE UP (ref 39–50)
HGB BLD-MCNC: 12 G/DL — LOW (ref 13–17)
HGB BLD-MCNC: 12.6 G/DL — LOW (ref 13–17)
INR BLD: 2.05 RATIO — HIGH (ref 0.88–1.16)
MAGNESIUM SERPL-MCNC: 2.3 MG/DL — SIGNIFICANT CHANGE UP (ref 1.6–2.6)
MCHC RBC-ENTMCNC: 25.7 PG — LOW (ref 27–34)
MCHC RBC-ENTMCNC: 25.9 PG — LOW (ref 27–34)
MCHC RBC-ENTMCNC: 31.2 GM/DL — LOW (ref 32–36)
MCHC RBC-ENTMCNC: 31.7 GM/DL — LOW (ref 32–36)
MCV RBC AUTO: 81.9 FL — SIGNIFICANT CHANGE UP (ref 80–100)
MCV RBC AUTO: 82.3 FL — SIGNIFICANT CHANGE UP (ref 80–100)
NRBC # BLD: 0 /100 WBCS — SIGNIFICANT CHANGE UP
NRBC # BLD: 0 /100 WBCS — SIGNIFICANT CHANGE UP
NRBC # FLD: 0 K/UL — SIGNIFICANT CHANGE UP
NRBC # FLD: 0 K/UL — SIGNIFICANT CHANGE UP
PHOSPHATE SERPL-MCNC: 4.2 MG/DL — SIGNIFICANT CHANGE UP (ref 2.5–4.5)
PLATELET # BLD AUTO: 204 K/UL — SIGNIFICANT CHANGE UP (ref 150–400)
PLATELET # BLD AUTO: 219 K/UL — SIGNIFICANT CHANGE UP (ref 150–400)
POTASSIUM SERPL-MCNC: 4.9 MMOL/L — SIGNIFICANT CHANGE UP (ref 3.5–5.3)
POTASSIUM SERPL-SCNC: 4.9 MMOL/L — SIGNIFICANT CHANGE UP (ref 3.5–5.3)
PROTHROM AB SERPL-ACNC: 23.9 SEC — HIGH (ref 10.5–13.4)
RBC # BLD: 4.63 M/UL — SIGNIFICANT CHANGE UP (ref 4.2–5.8)
RBC # BLD: 4.91 M/UL — SIGNIFICANT CHANGE UP (ref 4.2–5.8)
RBC # FLD: 17.8 % — HIGH (ref 10.3–14.5)
RBC # FLD: 18.6 % — HIGH (ref 10.3–14.5)
SODIUM SERPL-SCNC: 141 MMOL/L — SIGNIFICANT CHANGE UP (ref 135–145)
URATE SERPL-MCNC: 9.8 MG/DL — HIGH (ref 3.4–8.8)
WBC # BLD: 10.95 K/UL — HIGH (ref 3.8–10.5)
WBC # BLD: 14.82 K/UL — HIGH (ref 3.8–10.5)
WBC # FLD AUTO: 10.95 K/UL — HIGH (ref 3.8–10.5)
WBC # FLD AUTO: 14.82 K/UL — HIGH (ref 3.8–10.5)

## 2022-03-19 RX ORDER — HYDRALAZINE HCL 50 MG
100 TABLET ORAL THREE TIMES A DAY
Refills: 0 | Status: DISCONTINUED | OUTPATIENT
Start: 2022-03-19 | End: 2022-03-21

## 2022-03-19 RX ORDER — WARFARIN SODIUM 2.5 MG/1
10 TABLET ORAL ONCE
Refills: 0 | Status: COMPLETED | OUTPATIENT
Start: 2022-03-19 | End: 2022-03-19

## 2022-03-19 RX ORDER — HYDRALAZINE HCL 50 MG
100 TABLET ORAL THREE TIMES A DAY
Refills: 0 | Status: DISCONTINUED | OUTPATIENT
Start: 2022-03-19 | End: 2022-03-19

## 2022-03-19 RX ADMIN — Medication 50 MILLIGRAM(S): at 02:02

## 2022-03-19 RX ADMIN — Medication 50 MILLIGRAM(S): at 09:17

## 2022-03-19 RX ADMIN — WARFARIN SODIUM 10 MILLIGRAM(S): 2.5 TABLET ORAL at 17:27

## 2022-03-19 RX ADMIN — Medication 100 MILLIGRAM(S): at 21:41

## 2022-03-19 RX ADMIN — Medication 40 MILLIGRAM(S): at 17:27

## 2022-03-19 RX ADMIN — ATORVASTATIN CALCIUM 10 MILLIGRAM(S): 80 TABLET, FILM COATED ORAL at 21:41

## 2022-03-19 RX ADMIN — Medication 20 MILLIGRAM(S): at 19:09

## 2022-03-19 RX ADMIN — AMLODIPINE BESYLATE 5 MILLIGRAM(S): 2.5 TABLET ORAL at 17:27

## 2022-03-19 RX ADMIN — Medication 200 MILLIGRAM(S): at 09:17

## 2022-03-19 RX ADMIN — Medication 40 MILLIGRAM(S): at 05:53

## 2022-03-19 NOTE — PHYSICAL THERAPY INITIAL EVALUATION ADULT - GAIT DEVIATIONS NOTED, PT EVAL
occasional right knee buckling/decreased abi/decreased step length/decreased stride length/decreased weight-shifting ability

## 2022-03-19 NOTE — PROGRESS NOTE ADULT - SUBJECTIVE AND OBJECTIVE BOX
Date of service: 03/19/22    chief complaint: sob     extended hpi: 71 year old male with PMH HTN, CKD, DM, Gout, AF on Coumadin, (OP Cardio is Eleno), who presented to ER from PMD where he was found to be hypertensive and admits to worsening SAENZ.  He reports chronic R knee pain that limits his exercise capacity but admits his SAENZ and LE edema has worsened over a few months.     S: pt seen and examined, no complaints, ROS - .      Review of Systems:   Constitutional: [ ] fevers, [ ] chills.   Skin: [ ] dry skin. [ ] rashes.  Psychiatric: [ ] depression, [ ] anxiety.   Gastrointestinal: [ ] BRBPR, [ ] melena.   Neurological: [ ] confusion. [ ] seizures. [ ] shuffling gait.   Ears,Nose,Mouth and Throat: [ ] ear pain [ ] sore throat.   Eyes: [ ] diplopia.   Respiratory: [ ] hemoptysis. [ ] shortness of breath  Cardiovascular: See HPI above  Hematologic/Lymphatic: [ ] anemia. [ ] painful nodes. [ ] prolonged bleeding.   Genitourinary: [ ] hematuria. [ ] flank pain.   Endocrine: [ ] significant change in weight. [ ] intolerance to heat and cold.     Review of systems [x ] otherwise negative, [ ] otherwise unable to obtain    FH: no family history of sudden cardiac death in first degree relatives    SH: [ ] tobacco, [ ] alcohol, [ ] drugs          acetaminophen     Tablet .. 650 milliGRAM(s) Oral every 6 hours PRN  amLODIPine   Tablet 5 milliGRAM(s) Oral daily  atorvastatin 10 milliGRAM(s) Oral at bedtime  dextrose 40% Gel 15 Gram(s) Oral once  dextrose 5%. 1000 milliLiter(s) IV Continuous <Continuous>  dextrose 5%. 1000 milliLiter(s) IV Continuous <Continuous>  dextrose 50% Injectable 25 Gram(s) IV Push once  dextrose 50% Injectable 12.5 Gram(s) IV Push once  dextrose 50% Injectable 25 Gram(s) IV Push once  furosemide    Tablet 40 milliGRAM(s) Oral two times a day  glucagon  Injectable 1 milliGRAM(s) IntraMuscular once  hydrALAZINE 50 milliGRAM(s) Oral every 8 hours  influenza  Vaccine (HIGH DOSE) 0.7 milliLiter(s) IntraMuscular once  insulin lispro (ADMELOG) corrective regimen sliding scale   SubCutaneous three times a day before meals  insulin lispro (ADMELOG) corrective regimen sliding scale   SubCutaneous at bedtime  methylPREDNISolone sodium succinate Injectable 20 milliGRAM(s) IV Push once  metoprolol succinate  milliGRAM(s) Oral daily  warfarin 10 milliGRAM(s) Oral once                            12.6   10.95 )-----------( 219      ( 19 Mar 2022 07:30 )             40.4       Hemoglobin: 12.6 g/dL (03-19 @ 07:30)  Hemoglobin: 12.7 g/dL (03-18 @ 06:32)  Hemoglobin: 12.3 g/dL (03-17 @ 07:01)  Hemoglobin: 11.8 g/dL (03-16 @ 14:35)      03-19    141  |  101  |  38<H>  ----------------------------<  118<H>  4.9   |  25  |  1.46<H>    Ca    9.0      19 Mar 2022 07:30  Phos  4.2     03-19  Mg     2.30     03-19      Creatinine Trend: 1.46<--, 1.45<--, 1.31<--, 1.51<--    COAGS: PT/INR - ( 19 Mar 2022 07:30 )   PT: 23.9 sec;   INR: 2.05 ratio                   T(C): 36.7 (03-19-22 @ 11:15), Max: 37 (03-18-22 @ 13:15)  HR: 85 (03-19-22 @ 11:15) (68 - 90)  BP: 127/76 (03-19-22 @ 11:15) (127/76 - 185/102)  RR: 18 (03-19-22 @ 11:15) (17 - 18)  SpO2: 100% (03-19-22 @ 11:15) (98% - 100%)  Wt(kg): --    I&O's Summary    18 Mar 2022 07:01  -  19 Mar 2022 07:00  --------------------------------------------------------  IN: 1025 mL / OUT: 1750 mL / NET: -725 mL      General: Well nourished in no acute distress. Alert and Oriented * 3.   Head: Normocephalic and atraumatic.   Neck: No JVD. No bruits. Supple. Does not appear to be enlarged.   Cardiovascular: + S1,S2 ; RRR Soft systolic murmur at the left lower sternal border. No rubs noted.    Lungs: CTA b/l. No rhonchi, rales or wheezes.   Abdomen: + BS, soft. Non tender. Non distended. No rebound. No guarding.   Extremities: + edema in LE bilaterally   Neurologic: Moves all four extremities. Full range of motion.   Skin: Warm and moist. The patient's skin has normal elasticity and good skin turgor.   Psychiatric: Appropriate mood and affect.  Musculoskeletal: Normal range of motion, normal strength    Tele: AF    TTE: < from: TTE with Doppler (w/Cont) (03.17.22 @ 11:13) >  1. Mitral annular calcification, otherwise normal mitral  valve. Mild mitral regurgitation.  2. Severely dilated left atrium.  LA volume index = 69  cc/m2.  3. Mild concentric left ventricular hypertrophy.  4. Endocardium not well visualized; grossly normal left  ventricular systolic function.  Endocardial visualization  enhanced with intravenous injection of echo contrast  (Definity).  5. Severe right atrial enlargement.  6. The right ventricle is not well visualized; grossly  normal right ventricular systolic function.  7. Estimated right ventricular systolic pressure equals 60  mm Hg, assuming right atrial pressure equals 10 mm Hg,  consistent with moderate pulmonary hypertension.    < end of copied text >    NST :  pending     A/P: 71 year old male with PMH HTN, CKD, DM, Gout, AF on Coumadin, (OP Cardio is Eleno), who presented to ER from PMD where he was found to be hypertensive and admits to worsening SAENZ.  He reports chronic R knee pain that limits his exercise capacity but admits his SAENZ and LE edema has worsened over a few months.     -pt. still volume overloaded however volume status has improved since initiation of diuretics  -continue with  PO lasix to keep O > I, Renal follow up    -TTE with normal LV function with moderate pulmonary HTN - pulm eval with Dr. Dan called - follow up recommendations   -right knee x-ray and medicine eval noted - solumedrol per medicine   -check NST to evaluate SAENZ   -continue with coumadin for goal INR 2-3

## 2022-03-19 NOTE — PROGRESS NOTE ADULT - ASSESSMENT
Agree with above assessment and plan as outlined above.    - f/u stress test    Roberto Umana MD, [facility]  BEEPER [phone]

## 2022-03-19 NOTE — PHYSICAL THERAPY INITIAL EVALUATION ADULT - GENERAL OBSERVATIONS, REHAB EVAL
Upon entry, pt seated on EOB in NAD; + telemetry. Pt left seated in bedside chair with all tubes/lines intact, call bell in reach and in NAD. MAMTA cha.

## 2022-03-19 NOTE — PHYSICAL THERAPY INITIAL EVALUATION ADULT - ADDITIONAL COMMENTS
Pt lives in a two-story home with his wife; bedroom is on the second floor. There are 3 steps to enter and 1 flight of stairs to second floor. Pt reports he was independent with mobility, self-care, and ADLs; utilized single point cane for household ambulation and rolling walker for community ambulation.

## 2022-03-19 NOTE — PROGRESS NOTE ADULT - SUBJECTIVE AND OBJECTIVE BOX
EP ATTENDING    tele: rate controlled AF  has received PRN's for high BP.  he denies palpitations, syncope, nor angina, ROS otherwise -    DATE OF SERVICE - 03-19-22 @ 12:10    Review of Systems:   Constitutional: [ ] fevers, [ ] chills.   Skin: [ ] dry skin. [ ] rashes.  Psychiatric: [ ] depression, [ ] anxiety.   Gastrointestinal: [ ] BRBPR, [ ] melena.   Neurological: [ ] confusion. [ ] seizures. [ ] shuffling gait.   Ears,Nose,Mouth and Throat: [ ] ear pain [ ] sore throat.   Eyes: [ ] diplopia.   Respiratory: [ ] hemoptysis. [ ] shortness of breath  Cardiovascular: See HPI above  Hematologic/Lymphatic: [ ] anemia. [ ] painful nodes. [ ] prolonged bleeding.   Genitourinary: [ ] hematuria. [ ] flank pain.   Endocrine: [ ] significant change in weight. [ ] intolerance to heat and cold.     Review of systems [ x] otherwise negative, [ ] otherwise unable to obtain    FH: no family history of sudden cardiac death in first degree relatives    SH: [ ] tobacco, [ ] alcohol, [ ] drugs    acetaminophen     Tablet .. 650 milliGRAM(s) Oral every 6 hours PRN  amLODIPine   Tablet 5 milliGRAM(s) Oral daily  atorvastatin 10 milliGRAM(s) Oral at bedtime  dextrose 40% Gel 15 Gram(s) Oral once  dextrose 5%. 1000 milliLiter(s) IV Continuous <Continuous>  dextrose 5%. 1000 milliLiter(s) IV Continuous <Continuous>  dextrose 50% Injectable 25 Gram(s) IV Push once  dextrose 50% Injectable 12.5 Gram(s) IV Push once  dextrose 50% Injectable 25 Gram(s) IV Push once  furosemide    Tablet 40 milliGRAM(s) Oral two times a day  glucagon  Injectable 1 milliGRAM(s) IntraMuscular once  hydrALAZINE 50 milliGRAM(s) Oral every 8 hours  influenza  Vaccine (HIGH DOSE) 0.7 milliLiter(s) IntraMuscular once  insulin lispro (ADMELOG) corrective regimen sliding scale   SubCutaneous three times a day before meals  insulin lispro (ADMELOG) corrective regimen sliding scale   SubCutaneous at bedtime  methylPREDNISolone sodium succinate Injectable 20 milliGRAM(s) IV Push once  metoprolol succinate  milliGRAM(s) Oral daily  warfarin 10 milliGRAM(s) Oral once                            12.6   10.95 )-----------( 219      ( 19 Mar 2022 07:30 )             40.4       03-19    141  |  101  |  38<H>  ----------------------------<  118<H>  4.9   |  25  |  1.46<H>    Ca    9.0      19 Mar 2022 07:30  Phos  4.2     03-19  Mg     2.30     03-19      T(C): 36.7 (03-19-22 @ 11:15), Max: 37 (03-18-22 @ 13:15)  HR: 85 (03-19-22 @ 11:15) (68 - 90)  BP: 127/76 (03-19-22 @ 11:15) (127/76 - 185/102)  RR: 18 (03-19-22 @ 11:15) (17 - 18)  SpO2: 100% (03-19-22 @ 11:15) (98% - 100%)  Wt(kg): --    I&O's Summary    18 Mar 2022 07:01  -  19 Mar 2022 07:00  --------------------------------------------------------  IN: 1025 mL / OUT: 1750 mL / NET: -725 mL    General: Well nourished in no acute distress. Alert and Oriented * 3.   Head: Normocephalic and atraumatic.   Neck: No JVD. No bruits. Supple. Does not appear to be enlarged.   Cardiovascular: + S1,S2 ; IRR Soft systolic murmur at the left lower sternal border. No rubs noted.    Lungs: CTA b/l. No rhonchi, rales or wheezes.   Abdomen: + BS, soft. Non tender. Non distended. No rebound. No guarding.   Extremities: No clubbing/cyanosis/edema.   Neurologic: Moves all four extremities. Full range of motion.   Skin: Warm and moist. The patient's skin has normal elasticity and good skin turgor.   Psychiatric: Appropriate mood and affect.  Musculoskeletal: Normal range of motion, normal strength      Echo: moderate pulmonary HTN, otherwise unremarkable      A/P) 70 y/o male PMH HTN, mild CKD, DM, gout, AF (unclear if paroxysmal or persistent) now a/w uncontrolled hypertension from his PMD's office ([facility]). His cardiologist (Valdo Crowley) manages his AF with toprol 200 and coumadin. EP now called for help with AF rate control    -continue lifelong a/c  -management of hypertension as per cardiology  -continue toprol xl 200mg daily  -no further inpatient EP workup needed  -if there is any further AF with RVR then next step would be adding dig 0.125mg daily  -f/u with [facility] after discharge  -will increase hydralazine dose on behalf of cardiology team.    Minor Tolbert M.D.  Cardiac Electrophysiology  [phone]

## 2022-03-19 NOTE — PROGRESS NOTE ADULT - SUBJECTIVE AND OBJECTIVE BOX
Nephrology Progress Note    Patient is a 71y male no headaches or dizziness.    Allergies:  No Known Allergies    Hospital Medications:   MEDICATIONS  (STANDING):  amLODIPine   Tablet 5 milliGRAM(s) Oral daily  atorvastatin 10 milliGRAM(s) Oral at bedtime  dextrose 40% Gel 15 Gram(s) Oral once  dextrose 5%. 1000 milliLiter(s) (50 mL/Hr) IV Continuous <Continuous>  dextrose 5%. 1000 milliLiter(s) (100 mL/Hr) IV Continuous <Continuous>  dextrose 50% Injectable 25 Gram(s) IV Push once  dextrose 50% Injectable 12.5 Gram(s) IV Push once  dextrose 50% Injectable 25 Gram(s) IV Push once  furosemide    Tablet 40 milliGRAM(s) Oral two times a day  glucagon  Injectable 1 milliGRAM(s) IntraMuscular once  hydrALAZINE 50 milliGRAM(s) Oral every 8 hours  influenza  Vaccine (HIGH DOSE) 0.7 milliLiter(s) IntraMuscular once  insulin lispro (ADMELOG) corrective regimen sliding scale   SubCutaneous three times a day before meals  insulin lispro (ADMELOG) corrective regimen sliding scale   SubCutaneous at bedtime  metoprolol succinate  milliGRAM(s) Oral daily      VITALS:  T(F): 98 (22 @ 05:50), Max: 98.6 (22 @ 13:15)  HR: 85 (22 @ 05:50)  BP: 145/85 (22 @ 05:50)  RR: 17 (22 @ 05:50)  SpO2: 100% (22 @ 05:50)       @ 07:  -   @ 07:00  --------------------------------------------------------  IN: 400 mL / OUT: 1950 mL / NET: -1550 mL     @ 07:01  -   @ 07:00  --------------------------------------------------------  IN: 1025 mL / OUT: 1750 mL / NET: -725 mL        PHYSICAL EXAM:  Constitutional: NAD  HEENT: anicteric sclera, oropharynx clear, MMM  Neck: No JVD  Respiratory: CTAB, no wheezes, rales or rhonchi  Cardiovascular: S1, S2, RRR  Gastrointestinal: BS+, soft, NT/ND  Extremities: + peripheral edema  Neurological: A/O x 3  Psychiatric: Normal mood, normal affect  : No CVA tenderness. No greer.   Skin: No rashes      LABS:      141  |  101  |  38<H>  ----------------------------<  118<H>  4.9   |  25  |  1.46<H>    Ca    9.0      19 Mar 2022 07:30  Phos  4.2       Mg     2.30                                 12.6   10.95 )-----------( 219      ( 19 Mar 2022 07:30 )             40.4       Urine Studies:  Urinalysis Basic - ( 17 Mar 2022 07:37 )    Color: Light Yellow / Appearance: Clear / S.010 / pH:   Gluc:  / Ketone: Negative  / Bili: Negative / Urobili: <2 mg/dL   Blood:  / Protein: 30 mg/dL / Nitrite: Negative   Leuk Esterase: Negative / RBC:  / WBC 0-2 /HPF   Sq Epi:  / Non Sq Epi:  / Bacteria:       Sodium, Random Urine: 113 mmol/L ( @ 07:37)  Creatinine, Random Urine: 55 mg/dL ( @ 07:37)

## 2022-03-19 NOTE — PHYSICAL THERAPY INITIAL EVALUATION ADULT - PERTINENT HX OF CURRENT PROBLEM, REHAB EVAL
Pt is a 71 year old male presenting from PCP office secondary to severely elevated BP in the 200s and abnormal EKG. Pt admitted for hypertensive emergency, abnormal EKG, and acute systolic congestive heart failure.

## 2022-03-19 NOTE — PHYSICAL THERAPY INITIAL EVALUATION ADULT - LEVEL OF INDEPENDENCE: STAIR NEGOTIATION, REHAB EVAL
Ascend contact-guard assistance; descend minimal assistance/contact guard/minimum assist (75% patients effort)

## 2022-03-19 NOTE — PHYSICAL THERAPY INITIAL EVALUATION ADULT - PATIENT PROFILE REVIEW, REHAB EVAL
PT initial evaluation received and chart review completed. Pt agreeable to participate in PT evaluation. Pt cleared by MAMTA Browning./yes

## 2022-03-19 NOTE — PHYSICAL THERAPY INITIAL EVALUATION ADULT - DISCHARGE DISPOSITION, PT EVAL
Recommend discharge home with outpatient PT services to address impairments in strength, balance, neuromuscular control, and endurance. Recommend utilization of rolling walker for safe discharge home; pt verbalized understanding.

## 2022-03-19 NOTE — PROGRESS NOTE ADULT - ASSESSMENT
This is a 70 y/o M with pmhx of HTN, CHF, afib, DM presented to the ED for HTN emergency that was coupled with acute systolic heart failure   Morbid obesity   CKD stage 3a likely from HTN   Moderate PHTN that could be MAREN    1 Renal- mild proteinuria, creatinine stable  - Trend serum creatinine  -  Lasix 40mg po BID     2 CVS-bp controlled  3 Pulm-Outpt sleep study     Angella Watson NP  [facility]   [phone]

## 2022-03-19 NOTE — PROGRESS NOTE ADULT - SUBJECTIVE AND OBJECTIVE BOX
Patient is a 71y old  Male who presents with a chief complaint of HTN emergency (19 Mar 2022 08:55)      DATE OF SERVICE: 03-19-22 @ 11:18    SUBJECTIVE / OVERNIGHT EVENTS: overnight events noted    ROS:  Resp: No cough no sputum production  CVS: No chest pain no palpitations no orthopnea  GI: no N/V/D  : no dysuria, no hematuria  Neuro: no weakness no paresthesias  Heme: No petechiae no easy bruising  Msk: right knee joint pain and swelling much improved   Skin: No rash no itching        MEDICATIONS  (STANDING):  amLODIPine   Tablet 5 milliGRAM(s) Oral daily  atorvastatin 10 milliGRAM(s) Oral at bedtime  dextrose 40% Gel 15 Gram(s) Oral once  dextrose 5%. 1000 milliLiter(s) (50 mL/Hr) IV Continuous <Continuous>  dextrose 5%. 1000 milliLiter(s) (100 mL/Hr) IV Continuous <Continuous>  dextrose 50% Injectable 25 Gram(s) IV Push once  dextrose 50% Injectable 12.5 Gram(s) IV Push once  dextrose 50% Injectable 25 Gram(s) IV Push once  furosemide    Tablet 40 milliGRAM(s) Oral two times a day  glucagon  Injectable 1 milliGRAM(s) IntraMuscular once  hydrALAZINE 50 milliGRAM(s) Oral every 8 hours  influenza  Vaccine (HIGH DOSE) 0.7 milliLiter(s) IntraMuscular once  insulin lispro (ADMELOG) corrective regimen sliding scale   SubCutaneous three times a day before meals  insulin lispro (ADMELOG) corrective regimen sliding scale   SubCutaneous at bedtime  methylPREDNISolone sodium succinate Injectable 20 milliGRAM(s) IV Push once  metoprolol succinate  milliGRAM(s) Oral daily    MEDICATIONS  (PRN):  acetaminophen     Tablet .. 650 milliGRAM(s) Oral every 6 hours PRN Temp greater or equal to 38C (100.4F), Mild Pain (1 - 3), Moderate Pain (4 - 6)        CAPILLARY BLOOD GLUCOSE      POCT Blood Glucose.: 119 mg/dL (19 Mar 2022 08:45)  POCT Blood Glucose.: 201 mg/dL (18 Mar 2022 22:37)  POCT Blood Glucose.: 263 mg/dL (18 Mar 2022 21:23)  POCT Blood Glucose.: 108 mg/dL (18 Mar 2022 18:08)  POCT Blood Glucose.: 101 mg/dL (18 Mar 2022 12:40)    I&O's Summary    18 Mar 2022 07:01  -  19 Mar 2022 07:00  --------------------------------------------------------  IN: 1025 mL / OUT: 1750 mL / NET: -725 mL        Vital Signs Last 24 Hrs  T(C): 36.7 (19 Mar 2022 11:15), Max: 37 (18 Mar 2022 13:15)  T(F): 98.1 (19 Mar 2022 11:15), Max: 98.6 (18 Mar 2022 13:15)  HR: 85 (19 Mar 2022 11:15) (68 - 90)  BP: 127/76 (19 Mar 2022 11:15) (127/76 - 185/102)  BP(mean): --  RR: 18 (19 Mar 2022 11:15) (17 - 18)  SpO2: 100% (19 Mar 2022 11:15) (98% - 100%)    PHYSICAL EXAM:  EYES: EOMI, PERRLA  NECK: Supple, No JVD  CHEST/LUNG: clear  HEART: S1 S2; no murmurs   ABDOMEN: Soft, Nontender  EXTREMITIES:  trace bilateral edema  NEUROLOGY: AO x 3 non-focal  SKIN: No rashes or lesions  MSK: right knee much less swelling with warmth  ROM markedly improved     LABS:                        12.6   10.95 )-----------( 219      ( 19 Mar 2022 07:30 )             40.4     03-19    141  |  101  |  38<H>  ----------------------------<  118<H>  4.9   |  25  |  1.46<H>    Ca    9.0      19 Mar 2022 07:30  Phos  4.2     03-19  Mg     2.30     03-19      PT/INR - ( 19 Mar 2022 07:30 )   PT: 23.9 sec;   INR: 2.05 ratio         PTT - ( 18 Mar 2022 06:32 )  PTT:36.0 sec            All consultant(s) notes reviewed and care discussed with other providers        Contact Number, Dr Holder [phone]

## 2022-03-20 DIAGNOSIS — N18.30 CHRONIC KIDNEY DISEASE, STAGE 3 UNSPECIFIED: ICD-10-CM

## 2022-03-20 LAB
ANION GAP SERPL CALC-SCNC: 12 MMOL/L — SIGNIFICANT CHANGE UP (ref 7–14)
BUN SERPL-MCNC: 48 MG/DL — HIGH (ref 7–23)
CALCIUM SERPL-MCNC: 8.9 MG/DL — SIGNIFICANT CHANGE UP (ref 8.4–10.5)
CHLORIDE SERPL-SCNC: 103 MMOL/L — SIGNIFICANT CHANGE UP (ref 98–107)
CO2 SERPL-SCNC: 26 MMOL/L — SIGNIFICANT CHANGE UP (ref 22–31)
CREAT SERPL-MCNC: 1.61 MG/DL — HIGH (ref 0.5–1.3)
EGFR: 45 ML/MIN/1.73M2 — LOW
GLUCOSE BLDC GLUCOMTR-MCNC: 134 MG/DL — HIGH (ref 70–99)
GLUCOSE BLDC GLUCOMTR-MCNC: 149 MG/DL — HIGH (ref 70–99)
GLUCOSE BLDC GLUCOMTR-MCNC: 171 MG/DL — HIGH (ref 70–99)
GLUCOSE BLDC GLUCOMTR-MCNC: 87 MG/DL — SIGNIFICANT CHANGE UP (ref 70–99)
GLUCOSE SERPL-MCNC: 118 MG/DL — HIGH (ref 70–99)
HCT VFR BLD CALC: 38.3 % — LOW (ref 39–50)
HGB BLD-MCNC: 11.9 G/DL — LOW (ref 13–17)
INR BLD: 2.23 RATIO — HIGH (ref 0.88–1.16)
MAGNESIUM SERPL-MCNC: 2.6 MG/DL — SIGNIFICANT CHANGE UP (ref 1.6–2.6)
MCHC RBC-ENTMCNC: 25.8 PG — LOW (ref 27–34)
MCHC RBC-ENTMCNC: 31.1 GM/DL — LOW (ref 32–36)
MCV RBC AUTO: 82.9 FL — SIGNIFICANT CHANGE UP (ref 80–100)
NRBC # BLD: 0 /100 WBCS — SIGNIFICANT CHANGE UP
NRBC # FLD: 0 K/UL — SIGNIFICANT CHANGE UP
PHOSPHATE SERPL-MCNC: 4.2 MG/DL — SIGNIFICANT CHANGE UP (ref 2.5–4.5)
PLATELET # BLD AUTO: 204 K/UL — SIGNIFICANT CHANGE UP (ref 150–400)
POTASSIUM SERPL-MCNC: 4.8 MMOL/L — SIGNIFICANT CHANGE UP (ref 3.5–5.3)
POTASSIUM SERPL-SCNC: 4.8 MMOL/L — SIGNIFICANT CHANGE UP (ref 3.5–5.3)
PROTHROM AB SERPL-ACNC: 26.1 SEC — HIGH (ref 10.5–13.4)
RBC # BLD: 4.62 M/UL — SIGNIFICANT CHANGE UP (ref 4.2–5.8)
RBC # FLD: 17.8 % — HIGH (ref 10.3–14.5)
SODIUM SERPL-SCNC: 141 MMOL/L — SIGNIFICANT CHANGE UP (ref 135–145)
WBC # BLD: 10.56 K/UL — HIGH (ref 3.8–10.5)
WBC # FLD AUTO: 10.56 K/UL — HIGH (ref 3.8–10.5)

## 2022-03-20 PROCEDURE — 93016 CV STRESS TEST SUPVJ ONLY: CPT | Mod: GC

## 2022-03-20 PROCEDURE — 78452 HT MUSCLE IMAGE SPECT MULT: CPT | Mod: 26

## 2022-03-20 PROCEDURE — 93018 CV STRESS TEST I&R ONLY: CPT | Mod: GC

## 2022-03-20 RX ORDER — WARFARIN SODIUM 2.5 MG/1
7.5 TABLET ORAL ONCE
Refills: 0 | Status: COMPLETED | OUTPATIENT
Start: 2022-03-20 | End: 2022-03-20

## 2022-03-20 RX ADMIN — Medication 200 MILLIGRAM(S): at 10:16

## 2022-03-20 RX ADMIN — Medication 1: at 18:05

## 2022-03-20 RX ADMIN — AMLODIPINE BESYLATE 5 MILLIGRAM(S): 2.5 TABLET ORAL at 06:07

## 2022-03-20 RX ADMIN — Medication 100 MILLIGRAM(S): at 06:08

## 2022-03-20 RX ADMIN — Medication 40 MILLIGRAM(S): at 18:04

## 2022-03-20 RX ADMIN — WARFARIN SODIUM 7.5 MILLIGRAM(S): 2.5 TABLET ORAL at 18:04

## 2022-03-20 RX ADMIN — Medication 100 MILLIGRAM(S): at 13:30

## 2022-03-20 RX ADMIN — ATORVASTATIN CALCIUM 10 MILLIGRAM(S): 80 TABLET, FILM COATED ORAL at 21:39

## 2022-03-20 RX ADMIN — Medication 100 MILLIGRAM(S): at 21:37

## 2022-03-20 RX ADMIN — Medication 40 MILLIGRAM(S): at 06:09

## 2022-03-20 NOTE — PROGRESS NOTE ADULT - SUBJECTIVE AND OBJECTIVE BOX
Date of service: 03/20/22    chief complaint: sob     extended hpi: 71 year old male with PMH HTN, CKD, DM, Gout, AF on Coumadin, (OP Cardio is Eleno), who presented to ER from PMD where he was found to be hypertensive and admits to worsening SAENZ.  He reports chronic R knee pain that limits his exercise capacity but admits his SAENZ and LE edema has worsened over a few months.     S: pt seen and examined, no events noted     Review of Systems:   Constitutional: [ ] fevers, [ ] chills.   Skin: [ ] dry skin. [ ] rashes.  Psychiatric: [ ] depression, [ ] anxiety.   Gastrointestinal: [ ] BRBPR, [ ] melena.   Neurological: [ ] confusion. [ ] seizures. [ ] shuffling gait.   Ears,Nose,Mouth and Throat: [ ] ear pain [ ] sore throat.   Eyes: [ ] diplopia.   Respiratory: [ ] hemoptysis. [ ] shortness of breath  Cardiovascular: See HPI above  Hematologic/Lymphatic: [ ] anemia. [ ] painful nodes. [ ] prolonged bleeding.   Genitourinary: [ ] hematuria. [ ] flank pain.   Endocrine: [ ] significant change in weight. [ ] intolerance to heat and cold.     Review of systems [x ] otherwise negative, [ ] otherwise unable to obtain    FH: no family history of sudden cardiac death in first degree relatives    SH: [ ] tobacco, [ ] alcohol, [ ] drugs            acetaminophen     Tablet .. 650 milliGRAM(s) Oral every 6 hours PRN  amLODIPine   Tablet 5 milliGRAM(s) Oral daily  atorvastatin 10 milliGRAM(s) Oral at bedtime  dextrose 40% Gel 15 Gram(s) Oral once  dextrose 5%. 1000 milliLiter(s) IV Continuous <Continuous>  dextrose 5%. 1000 milliLiter(s) IV Continuous <Continuous>  dextrose 50% Injectable 25 Gram(s) IV Push once  dextrose 50% Injectable 12.5 Gram(s) IV Push once  dextrose 50% Injectable 25 Gram(s) IV Push once  furosemide    Tablet 40 milliGRAM(s) Oral two times a day  glucagon  Injectable 1 milliGRAM(s) IntraMuscular once  hydrALAZINE 100 milliGRAM(s) Oral three times a day  influenza  Vaccine (HIGH DOSE) 0.7 milliLiter(s) IntraMuscular once  insulin lispro (ADMELOG) corrective regimen sliding scale   SubCutaneous three times a day before meals  insulin lispro (ADMELOG) corrective regimen sliding scale   SubCutaneous at bedtime  metoprolol succinate  milliGRAM(s) Oral daily  warfarin 7.5 milliGRAM(s) Oral once                            11.9   10.56 )-----------( 204      ( 20 Mar 2022 07:24 )             38.3       Hemoglobin: 11.9 g/dL (03-20 @ 07:24)  Hemoglobin: 12.0 g/dL (03-19 @ 16:38)  Hemoglobin: 12.6 g/dL (03-19 @ 07:30)  Hemoglobin: 12.7 g/dL (03-18 @ 06:32)  Hemoglobin: 12.3 g/dL (03-17 @ 07:01)      03-20    141  |  103  |  48<H>  ----------------------------<  118<H>  4.8   |  26  |  1.61<H>    Ca    8.9      20 Mar 2022 07:24  Phos  4.2     03-20  Mg     2.60     03-20      Creatinine Trend: 1.61<--, 1.46<--, 1.45<--, 1.31<--, 1.51<--    COAGS: PT/INR - ( 20 Mar 2022 07:24 )   PT: 26.1 sec;   INR: 2.23 ratio                   T(C): 36.8 (03-20-22 @ 10:10), Max: 37.1 (03-19-22 @ 21:27)  HR: 76 (03-20-22 @ 10:10) (62 - 76)  BP: 134/78 (03-20-22 @ 10:10) (133/75 - 141/71)  RR: 18 (03-20-22 @ 10:10) (17 - 18)  SpO2: 100% (03-20-22 @ 10:10) (100% - 100%)  Wt(kg): --    I&O's Summary    19 Mar 2022 07:01  -  20 Mar 2022 07:00  --------------------------------------------------------  IN: 775 mL / OUT: 1550 mL / NET: -775 mL    20 Mar 2022 07:01  -  20 Mar 2022 11:18  --------------------------------------------------------  IN: 0 mL / OUT: 300 mL / NET: -300 mL        General: Well nourished in no acute distress. Alert and Oriented * 3.   Head: Normocephalic and atraumatic.   Neck: No JVD. No bruits. Supple. Does not appear to be enlarged.   Cardiovascular: + S1,S2 ; RRR Soft systolic murmur at the left lower sternal border. No rubs noted.    Lungs: CTA b/l. No rhonchi, rales or wheezes.   Abdomen: + BS, soft. Non tender. Non distended. No rebound. No guarding.   Extremities: + edema in LE bilaterally   Neurologic: Moves all four extremities. Full range of motion.   Skin: Warm and moist. The patient's skin has normal elasticity and good skin turgor.   Psychiatric: Appropriate mood and affect.  Musculoskeletal: Normal range of motion, normal strength    Tele: AF    TTE: < from: TTE with Doppler (w/Cont) (03.17.22 @ 11:13) >  1. Mitral annular calcification, otherwise normal mitral  valve. Mild mitral regurgitation.  2. Severely dilated left atrium.  LA volume index = 69  cc/m2.  3. Mild concentric left ventricular hypertrophy.  4. Endocardium not well visualized; grossly normal left  ventricular systolic function.  Endocardial visualization  enhanced with intravenous injection of echo contrast  (Definity).  5. Severe right atrial enlargement.  6. The right ventricle is not well visualized; grossly  normal right ventricular systolic function.  7. Estimated right ventricular systolic pressure equals 60  mm Hg, assuming right atrial pressure equals 10 mm Hg,  consistent with moderate pulmonary hypertension.    < end of copied text >    NST :  pending     A/P: 71 year old male with PMH HTN, CKD, DM, Gout, AF on Coumadin, (OP Cardio is Singaverjefeu), who presented to ER from PMD where he was found to be hypertensive and admits to worsening SAENZ.  He reports chronic R knee pain that limits his exercise capacity but admits his SAENZ and LE edema has worsened over a few months.     -pt. still volume overloaded however volume status has improved since initiation of diuretics  -continue with  PO lasix to keep O > I, Renal follow up    -TTE with normal LV function with moderate pulmonary HTN - pulm eval with Dr. Dan called - follow up recommendations   -right knee x-ray and medicine eval noted - solumedrol per medicine   -check NST to evaluate SAENZ   -continue with coumadin for goal INR 2-3

## 2022-03-20 NOTE — PROGRESS NOTE ADULT - ASSESSMENT
CARDIOLOGY ATTENDING ADDENDUM    - Stress with no ischemia  - Pulmonary f/u    Roberto Umana MD, FACC  [facility] [phone]

## 2022-03-20 NOTE — PROGRESS NOTE ADULT - SUBJECTIVE AND OBJECTIVE BOX
EP ATTENDING    tele: rate controlled AF  he denies palpitations, syncope, nor angina, ROS otherwise -  feeling well.    DATE OF SERVICE - 03-20-22 @ 12:10    Review of Systems:   Constitutional: [ ] fevers, [ ] chills.   Skin: [ ] dry skin. [ ] rashes.  Psychiatric: [ ] depression, [ ] anxiety.   Gastrointestinal: [ ] BRBPR, [ ] melena.   Neurological: [ ] confusion. [ ] seizures. [ ] shuffling gait.   Ears,Nose,Mouth and Throat: [ ] ear pain [ ] sore throat.   Eyes: [ ] diplopia.   Respiratory: [ ] hemoptysis. [ ] shortness of breath  Cardiovascular: See HPI above  Hematologic/Lymphatic: [ ] anemia. [ ] painful nodes. [ ] prolonged bleeding.   Genitourinary: [ ] hematuria. [ ] flank pain.   Endocrine: [ ] significant change in weight. [ ] intolerance to heat and cold.     Review of systems [ x] otherwise negative, [ ] otherwise unable to obtain    FH: no family history of sudden cardiac death in first degree relatives    SH: [ ] tobacco, [ ] alcohol, [ ] drugs    acetaminophen     Tablet .. 650 milliGRAM(s) Oral every 6 hours PRN  amLODIPine   Tablet 5 milliGRAM(s) Oral daily  atorvastatin 10 milliGRAM(s) Oral at bedtime  dextrose 40% Gel 15 Gram(s) Oral once  dextrose 5%. 1000 milliLiter(s) IV Continuous <Continuous>  dextrose 5%. 1000 milliLiter(s) IV Continuous <Continuous>  dextrose 50% Injectable 25 Gram(s) IV Push once  dextrose 50% Injectable 12.5 Gram(s) IV Push once  dextrose 50% Injectable 25 Gram(s) IV Push once  furosemide    Tablet 40 milliGRAM(s) Oral two times a day  glucagon  Injectable 1 milliGRAM(s) IntraMuscular once  hydrALAZINE 100 milliGRAM(s) Oral three times a day  influenza  Vaccine (HIGH DOSE) 0.7 milliLiter(s) IntraMuscular once  insulin lispro (ADMELOG) corrective regimen sliding scale   SubCutaneous three times a day before meals  insulin lispro (ADMELOG) corrective regimen sliding scale   SubCutaneous at bedtime  metoprolol succinate  milliGRAM(s) Oral daily  warfarin 7.5 milliGRAM(s) Oral once                            11.9   10.56 )-----------( 204      ( 20 Mar 2022 07:24 )             38.3       03-20    141  |  103  |  48<H>  ----------------------------<  118<H>  4.8   |  26  |  1.61<H>    Ca    8.9      20 Mar 2022 07:24  Phos  4.2     03-20  Mg     2.60     03-20        T(C): 36.8 (03-20-22 @ 10:10), Max: 37.1 (03-19-22 @ 21:27)  HR: 76 (03-20-22 @ 10:10) (62 - 76)  BP: 134/78 (03-20-22 @ 10:10) (133/75 - 141/71)  RR: 18 (03-20-22 @ 10:10) (17 - 18)  SpO2: 100% (03-20-22 @ 10:10) (100% - 100%)  Wt(kg): --    I&O's Summary    19 Mar 2022 07:01  -  20 Mar 2022 07:00  --------------------------------------------------------  IN: 775 mL / OUT: 1550 mL / NET: -775 mL    20 Mar 2022 07:01  -  20 Mar 2022 13:08  --------------------------------------------------------  IN: 0 mL / OUT: 300 mL / NET: -300 mL        General: Well nourished in no acute distress. Alert and Oriented * 3.   Head: Normocephalic and atraumatic.   Neck: No JVD. No bruits. Supple. Does not appear to be enlarged.   Cardiovascular: + S1,S2 ; IRR Soft systolic murmur at the left lower sternal border. No rubs noted.    Lungs: CTA b/l. No rhonchi, rales or wheezes.   Abdomen: + BS, soft. Non tender. Non distended. No rebound. No guarding.   Extremities: No clubbing/cyanosis/edema.   Neurologic: Moves all four extremities. Full range of motion.   Skin: Warm and moist. The patient's skin has normal elasticity and good skin turgor.   Psychiatric: Appropriate mood and affect.  Musculoskeletal: Normal range of motion, normal strength      Echo: moderate pulmonary HTN, otherwise unremarkable      A/P) 72 y/o male PMH HTN, mild CKD, DM, gout, AF (unclear if paroxysmal or persistent) now a/w uncontrolled hypertension from his PMD's office ([facility]). His cardiologist (Valdo Crowley) manages his AF with toprol 200 and coumadin. EP now called for help with AF rate control    -continue lifelong a/c  -continue toprol xl 200mg daily  -no further inpatient EP workup needed  -if there is any further AF with RVR then next step would be adding dig 0.125mg daily  -f/u with [facility] after discharge  -hydralazine dose increased.  -condition overall improving. hope to get him home in the coming days.    Minor Tolbert M.D.  Cardiac Electrophysiology  [phone]

## 2022-03-20 NOTE — PROGRESS NOTE ADULT - SUBJECTIVE AND OBJECTIVE BOX
Patient is a 71y old  Male who presents with a chief complaint of HTN emergency (20 Mar 2022 13:08)      DATE OF SERVICE: 03-20-22 @ 14:03    SUBJECTIVE / OVERNIGHT EVENTS: overnight events noted    ROS:  Resp: No cough no sputum production  CVS: No chest pain no palpitations no orthopnea  GI: no N/V/D          MEDICATIONS  (STANDING):  amLODIPine   Tablet 5 milliGRAM(s) Oral daily  atorvastatin 10 milliGRAM(s) Oral at bedtime  dextrose 40% Gel 15 Gram(s) Oral once  dextrose 5%. 1000 milliLiter(s) (50 mL/Hr) IV Continuous <Continuous>  dextrose 5%. 1000 milliLiter(s) (100 mL/Hr) IV Continuous <Continuous>  dextrose 50% Injectable 25 Gram(s) IV Push once  dextrose 50% Injectable 12.5 Gram(s) IV Push once  dextrose 50% Injectable 25 Gram(s) IV Push once  furosemide    Tablet 40 milliGRAM(s) Oral two times a day  glucagon  Injectable 1 milliGRAM(s) IntraMuscular once  hydrALAZINE 100 milliGRAM(s) Oral three times a day  influenza  Vaccine (HIGH DOSE) 0.7 milliLiter(s) IntraMuscular once  insulin lispro (ADMELOG) corrective regimen sliding scale   SubCutaneous three times a day before meals  insulin lispro (ADMELOG) corrective regimen sliding scale   SubCutaneous at bedtime  metoprolol succinate  milliGRAM(s) Oral daily  warfarin 7.5 milliGRAM(s) Oral once    MEDICATIONS  (PRN):  acetaminophen     Tablet .. 650 milliGRAM(s) Oral every 6 hours PRN Temp greater or equal to 38C (100.4F), Mild Pain (1 - 3), Moderate Pain (4 - 6)        CAPILLARY BLOOD GLUCOSE      POCT Blood Glucose.: 134 mg/dL (20 Mar 2022 12:28)  POCT Blood Glucose.: 87 mg/dL (20 Mar 2022 10:14)  POCT Blood Glucose.: 177 mg/dL (19 Mar 2022 22:14)  POCT Blood Glucose.: 140 mg/dL (19 Mar 2022 17:42)    I&O's Summary    19 Mar 2022 07:01  -  20 Mar 2022 07:00  --------------------------------------------------------  IN: 775 mL / OUT: 1550 mL / NET: -775 mL    20 Mar 2022 07:01  -  20 Mar 2022 14:03  --------------------------------------------------------  IN: 0 mL / OUT: 300 mL / NET: -300 mL        Vital Signs Last 24 Hrs  T(C): 36.8 (20 Mar 2022 10:10), Max: 37.1 (19 Mar 2022 21:27)  T(F): 98.2 (20 Mar 2022 10:10), Max: 98.8 (20 Mar 2022 01:27)  HR: 76 (20 Mar 2022 10:10) (62 - 76)  BP: 134/78 (20 Mar 2022 10:10) (133/75 - 141/71)  BP(mean): --  RR: 18 (20 Mar 2022 10:10) (17 - 18)  SpO2: 100% (20 Mar 2022 10:10) (100% - 100%)    PHYSICAL EXAM:  EYES: EOMI, PERRLA  NECK: Supple, No JVD  CHEST/LUNG: clear  HEART: S1 S2; no murmurs   ABDOMEN: Soft, Nontender  EXTREMITIES:  trace bilateral edema  NEUROLOGY: AO x 3 non-focal  SKIN: No rashes or lesions  MSK: right knee much less swelling with warmth  ROM markedly improved     LABS:                        11.9   10.56 )-----------( 204      ( 20 Mar 2022 07:24 )             38.3     03-20    141  |  103  |  48<H>  ----------------------------<  118<H>  4.8   |  26  |  1.61<H>    Ca    8.9      20 Mar 2022 07:24  Phos  4.2     03-20  Mg     2.60     03-20      PT/INR - ( 20 Mar 2022 07:24 )   PT: 26.1 sec;   INR: 2.23 ratio                     All consultant(s) notes reviewed and care discussed with other providers        Contact Number, Dr Holder [phone] Patient is a 71y old  Male who presents with a chief complaint of HTN emergency (20 Mar 2022 13:08)      DATE OF SERVICE: 03-20-22 @ 14:03    SUBJECTIVE / OVERNIGHT EVENTS: overnight events noted    ROS:  Resp: No cough no sputum production  CVS: No chest pain no palpitations no orthopnea  GI: no N/V/D  MSK: much improved right knee pain (80% better)          MEDICATIONS  (STANDING):  amLODIPine   Tablet 5 milliGRAM(s) Oral daily  atorvastatin 10 milliGRAM(s) Oral at bedtime  dextrose 40% Gel 15 Gram(s) Oral once  dextrose 5%. 1000 milliLiter(s) (50 mL/Hr) IV Continuous <Continuous>  dextrose 5%. 1000 milliLiter(s) (100 mL/Hr) IV Continuous <Continuous>  dextrose 50% Injectable 25 Gram(s) IV Push once  dextrose 50% Injectable 12.5 Gram(s) IV Push once  dextrose 50% Injectable 25 Gram(s) IV Push once  furosemide    Tablet 40 milliGRAM(s) Oral two times a day  glucagon  Injectable 1 milliGRAM(s) IntraMuscular once  hydrALAZINE 100 milliGRAM(s) Oral three times a day  influenza  Vaccine (HIGH DOSE) 0.7 milliLiter(s) IntraMuscular once  insulin lispro (ADMELOG) corrective regimen sliding scale   SubCutaneous three times a day before meals  insulin lispro (ADMELOG) corrective regimen sliding scale   SubCutaneous at bedtime  metoprolol succinate  milliGRAM(s) Oral daily  warfarin 7.5 milliGRAM(s) Oral once    MEDICATIONS  (PRN):  acetaminophen     Tablet .. 650 milliGRAM(s) Oral every 6 hours PRN Temp greater or equal to 38C (100.4F), Mild Pain (1 - 3), Moderate Pain (4 - 6)        CAPILLARY BLOOD GLUCOSE      POCT Blood Glucose.: 134 mg/dL (20 Mar 2022 12:28)  POCT Blood Glucose.: 87 mg/dL (20 Mar 2022 10:14)  POCT Blood Glucose.: 177 mg/dL (19 Mar 2022 22:14)  POCT Blood Glucose.: 140 mg/dL (19 Mar 2022 17:42)    I&O's Summary    19 Mar 2022 07:01  -  20 Mar 2022 07:00  --------------------------------------------------------  IN: 775 mL / OUT: 1550 mL / NET: -775 mL    20 Mar 2022 07:01  -  20 Mar 2022 14:03  --------------------------------------------------------  IN: 0 mL / OUT: 300 mL / NET: -300 mL        Vital Signs Last 24 Hrs  T(C): 36.8 (20 Mar 2022 10:10), Max: 37.1 (19 Mar 2022 21:27)  T(F): 98.2 (20 Mar 2022 10:10), Max: 98.8 (20 Mar 2022 01:27)  HR: 76 (20 Mar 2022 10:10) (62 - 76)  BP: 134/78 (20 Mar 2022 10:10) (133/75 - 141/71)  BP(mean): --  RR: 18 (20 Mar 2022 10:10) (17 - 18)  SpO2: 100% (20 Mar 2022 10:10) (100% - 100%)    PHYSICAL EXAM:  EYES: EOMI, PERRLA  NECK: Supple, No JVD  CHEST/LUNG: clear  HEART: S1 S2; no murmurs   ABDOMEN: Soft, Nontender  EXTREMITIES:  trace bilateral edema  NEUROLOGY: AO x 3 non-focal  SKIN: No rashes or lesions  MSK: right knee almost fully resolved swelling and warmth    LABS:                        11.9   10.56 )-----------( 204      ( 20 Mar 2022 07:24 )             38.3     03-20    141  |  103  |  48<H>  ----------------------------<  118<H>  4.8   |  26  |  1.61<H>    Ca    8.9      20 Mar 2022 07:24  Phos  4.2     03-20  Mg     2.60     03-20      PT/INR - ( 20 Mar 2022 07:24 )   PT: 26.1 sec;   INR: 2.23 ratio                     All consultant(s) notes reviewed and care discussed with other providers        Contact Number, Dr Holder [phone]

## 2022-03-21 ENCOUNTER — TRANSCRIPTION ENCOUNTER (OUTPATIENT)
Age: 72
End: 2022-03-21

## 2022-03-21 VITALS
HEART RATE: 71 BPM | TEMPERATURE: 98 F | RESPIRATION RATE: 18 BRPM | DIASTOLIC BLOOD PRESSURE: 69 MMHG | OXYGEN SATURATION: 100 % | SYSTOLIC BLOOD PRESSURE: 139 MMHG

## 2022-03-21 LAB
ANION GAP SERPL CALC-SCNC: 11 MMOL/L — SIGNIFICANT CHANGE UP (ref 7–14)
BUN SERPL-MCNC: 57 MG/DL — HIGH (ref 7–23)
CALCIUM SERPL-MCNC: 8.5 MG/DL — SIGNIFICANT CHANGE UP (ref 8.4–10.5)
CHLORIDE SERPL-SCNC: 101 MMOL/L — SIGNIFICANT CHANGE UP (ref 98–107)
CO2 SERPL-SCNC: 25 MMOL/L — SIGNIFICANT CHANGE UP (ref 22–31)
CREAT SERPL-MCNC: 1.56 MG/DL — HIGH (ref 0.5–1.3)
EGFR: 47 ML/MIN/1.73M2 — LOW
GLUCOSE BLDC GLUCOMTR-MCNC: 107 MG/DL — HIGH (ref 70–99)
GLUCOSE BLDC GLUCOMTR-MCNC: 139 MG/DL — HIGH (ref 70–99)
GLUCOSE BLDC GLUCOMTR-MCNC: 158 MG/DL — HIGH (ref 70–99)
GLUCOSE SERPL-MCNC: 103 MG/DL — HIGH (ref 70–99)
HCT VFR BLD CALC: 37.4 % — LOW (ref 39–50)
HGB BLD-MCNC: 11.6 G/DL — LOW (ref 13–17)
INR BLD: 2.62 RATIO — HIGH (ref 0.88–1.16)
MAGNESIUM SERPL-MCNC: 2.6 MG/DL — SIGNIFICANT CHANGE UP (ref 1.6–2.6)
MCHC RBC-ENTMCNC: 25.6 PG — LOW (ref 27–34)
MCHC RBC-ENTMCNC: 31 GM/DL — LOW (ref 32–36)
MCV RBC AUTO: 82.4 FL — SIGNIFICANT CHANGE UP (ref 80–100)
NRBC # BLD: 0 /100 WBCS — SIGNIFICANT CHANGE UP
NRBC # FLD: 0 K/UL — SIGNIFICANT CHANGE UP
PHOSPHATE SERPL-MCNC: 3.7 MG/DL — SIGNIFICANT CHANGE UP (ref 2.5–4.5)
PLATELET # BLD AUTO: 208 K/UL — SIGNIFICANT CHANGE UP (ref 150–400)
POTASSIUM SERPL-MCNC: 4.1 MMOL/L — SIGNIFICANT CHANGE UP (ref 3.5–5.3)
POTASSIUM SERPL-SCNC: 4.1 MMOL/L — SIGNIFICANT CHANGE UP (ref 3.5–5.3)
PROTHROM AB SERPL-ACNC: 30.7 SEC — HIGH (ref 10.5–13.4)
RBC # BLD: 4.54 M/UL — SIGNIFICANT CHANGE UP (ref 4.2–5.8)
RBC # FLD: 17.8 % — HIGH (ref 10.3–14.5)
SODIUM SERPL-SCNC: 137 MMOL/L — SIGNIFICANT CHANGE UP (ref 135–145)
WBC # BLD: 13.27 K/UL — HIGH (ref 3.8–10.5)
WBC # FLD AUTO: 13.27 K/UL — HIGH (ref 3.8–10.5)

## 2022-03-21 RX ORDER — WARFARIN SODIUM 2.5 MG/1
1 TABLET ORAL
Qty: 0 | Refills: 0 | DISCHARGE

## 2022-03-21 RX ORDER — LISINOPRIL 2.5 MG/1
1 TABLET ORAL
Qty: 0 | Refills: 0 | DISCHARGE

## 2022-03-21 RX ORDER — WARFARIN SODIUM 2.5 MG/1
6.5 TABLET ORAL ONCE
Refills: 0 | Status: COMPLETED | OUTPATIENT
Start: 2022-03-21 | End: 2022-03-21

## 2022-03-21 RX ORDER — COLCHICINE 0.6 MG
2 TABLET ORAL
Qty: 0 | Refills: 0 | DISCHARGE

## 2022-03-21 RX ORDER — METFORMIN HYDROCHLORIDE 850 MG/1
1 TABLET ORAL
Qty: 0 | Refills: 0 | DISCHARGE

## 2022-03-21 RX ORDER — FUROSEMIDE 40 MG
1 TABLET ORAL
Qty: 60 | Refills: 0
Start: 2022-03-21 | End: 2022-04-19

## 2022-03-21 RX ORDER — AMLODIPINE BESYLATE 2.5 MG/1
1 TABLET ORAL
Qty: 30 | Refills: 0
Start: 2022-03-21 | End: 2022-04-19

## 2022-03-21 RX ORDER — HYDRALAZINE HCL 50 MG
1 TABLET ORAL
Qty: 0 | Refills: 0 | DISCHARGE

## 2022-03-21 RX ORDER — HYDRALAZINE HCL 50 MG
1 TABLET ORAL
Qty: 90 | Refills: 0
Start: 2022-03-21 | End: 2022-04-19

## 2022-03-21 RX ADMIN — Medication 100 MILLIGRAM(S): at 06:47

## 2022-03-21 RX ADMIN — Medication 20 MILLIGRAM(S): at 11:31

## 2022-03-21 RX ADMIN — Medication 40 MILLIGRAM(S): at 18:14

## 2022-03-21 RX ADMIN — Medication 100 MILLIGRAM(S): at 14:29

## 2022-03-21 RX ADMIN — AMLODIPINE BESYLATE 5 MILLIGRAM(S): 2.5 TABLET ORAL at 06:48

## 2022-03-21 RX ADMIN — Medication 40 MILLIGRAM(S): at 06:47

## 2022-03-21 RX ADMIN — Medication 1: at 13:26

## 2022-03-21 RX ADMIN — Medication 200 MILLIGRAM(S): at 10:01

## 2022-03-21 NOTE — PROVIDER CONTACT NOTE (OTHER) - RECOMMENDATIONS
Provider contacted RN to hold Hydralazine at this time.
Notify provider
Notify provider
Give medications and recheck BP

## 2022-03-21 NOTE — PROGRESS NOTE ADULT - PROBLEM SELECTOR PLAN 2
Type II PHTN is likely given TTE findings  RHC very likely to show increased wedge
improved  continue meds for now
RERE ruled out after admission  stable stage 3 disease
improved  continue meds for now
RERE ruled out after admission  stable stage 3 disease

## 2022-03-21 NOTE — PROGRESS NOTE ADULT - PROBLEM SELECTOR PROBLEM 4
Acute systolic congestive heart failure

## 2022-03-21 NOTE — DISCHARGE NOTE PROVIDER - CARE PROVIDER_API CALL
Prasanth Dan (DO)  Critical Care Medicine; Internal Medicine; Pulmonary Disease  [address] [place] [address], [facility] [address]  [place] [address]  Phone: [phone]  Fax: [phone]  Follow Up Time:

## 2022-03-21 NOTE — PROGRESS NOTE ADULT - ASSESSMENT
Agree with above assessment and plan as outlined above.    - No need for further inpatient cardiac work up.  - D/C planning     Roberto Umana MD, [facility]  BEEPER [phone]

## 2022-03-21 NOTE — DISCHARGE NOTE NURSING/CASE MANAGEMENT/SOCIAL WORK - NSDCPEFALRISK_GEN_ALL_CORE
For information on Fall & Injury Prevention, visit: [email or web address] OR  [email or web address] OR  https://www.cdc.gov/steadi/patient.html
For information on Fall & Injury Prevention, visit: [email or web address] OR  [email or web address] OR  https://www.cdc.gov/steadi/patient.html

## 2022-03-21 NOTE — PROGRESS NOTE ADULT - NSPROGADDITIONALINFOA_GEN_ALL_CORE
discussed with patient in detail, expresses understanding of treatment plans.
discussed with patient in detail, expresses understanding of treatment plans.  discussed with covering ACP
discussed with EP attending
discussed with patient in detail, expresses understanding of treatment plans.

## 2022-03-21 NOTE — PROVIDER CONTACT NOTE (OTHER) - SITUATION
Provider contacted RN to hold Hydralazine at this time.
Pt /95
Patients /96
Pt /102,
Pt heart rate dropped to 45 for 6 seconds, and had a 2 second Pause on monitor

## 2022-03-21 NOTE — PROVIDER CONTACT NOTE (OTHER) - BACKGROUND
Pt admitted for essential hypertension
Pt admitted for Essential hypertension
Patient admitted for HTN
Pt admitted for Essential hypertension
71 yr old male admitted for essential hypertension

## 2022-03-21 NOTE — PROGRESS NOTE ADULT - SUBJECTIVE AND OBJECTIVE BOX
Date of service: 03/21/22    chief complaint: sob     extended hpi: 71 year old male with PMH HTN, CKD, DM, Gout, AF on Coumadin, (OP Cardio is Eleno), who presented to ER from PMD where he was found to be hypertensive and admits to worsening SAENZ.  He reports chronic R knee pain that limits his exercise capacity but admits his SAENZ and LE edema has worsened over a few months.     S: no chest pain or sob; LE swelling improved; still with right knee pain but it is improving on steroids; ros otherwise negative.     Review of Systems:   Constitutional: [ ] fevers, [ ] chills.   Skin: [ ] dry skin. [ ] rashes.  Psychiatric: [ ] depression, [ ] anxiety.   Gastrointestinal: [ ] BRBPR, [ ] melena.   Neurological: [ ] confusion. [ ] seizures. [ ] shuffling gait.   Ears,Nose,Mouth and Throat: [ ] ear pain [ ] sore throat.   Eyes: [ ] diplopia.   Respiratory: [ ] hemoptysis. [ ] shortness of breath  Cardiovascular: See HPI above  Hematologic/Lymphatic: [ ] anemia. [ ] painful nodes. [ ] prolonged bleeding.   Genitourinary: [ ] hematuria. [ ] flank pain.   Endocrine: [ ] significant change in weight. [ ] intolerance to heat and cold.     Review of systems [x ] otherwise negative, [ ] otherwise unable to obtain    FH: no family history of sudden cardiac death in first degree relatives    SH: [ ] tobacco, [ ] alcohol, [ ] drugs    acetaminophen     Tablet .. 650 milliGRAM(s) Oral every 6 hours PRN  amLODIPine   Tablet 5 milliGRAM(s) Oral daily  atorvastatin 10 milliGRAM(s) Oral at bedtime  dextrose 40% Gel 15 Gram(s) Oral once  dextrose 5%. 1000 milliLiter(s) IV Continuous <Continuous>  dextrose 5%. 1000 milliLiter(s) IV Continuous <Continuous>  dextrose 50% Injectable 25 Gram(s) IV Push once  dextrose 50% Injectable 12.5 Gram(s) IV Push once  dextrose 50% Injectable 25 Gram(s) IV Push once  furosemide    Tablet 40 milliGRAM(s) Oral two times a day  glucagon  Injectable 1 milliGRAM(s) IntraMuscular once  hydrALAZINE 100 milliGRAM(s) Oral three times a day  influenza  Vaccine (HIGH DOSE) 0.7 milliLiter(s) IntraMuscular once  insulin lispro (ADMELOG) corrective regimen sliding scale   SubCutaneous three times a day before meals  insulin lispro (ADMELOG) corrective regimen sliding scale   SubCutaneous at bedtime  metoprolol succinate  milliGRAM(s) Oral daily  predniSONE   Tablet 20 milliGRAM(s) Oral daily                            11.6   13.27 )-----------( 208      ( 21 Mar 2022 07:17 )             37.4       03-21    137  |  101  |  57<H>  ----------------------------<  103<H>  4.1   |  25  |  1.56<H>    Ca    8.5      21 Mar 2022 07:17  Phos  3.7     03-21  Mg     2.60     03-21    INR 2.62      T(C): 36.9 (03-21-22 @ 10:00), Max: 36.9 (03-21-22 @ 01:35)  HR: 77 (03-21-22 @ 10:00) (62 - 77)  BP: 119/76 (03-21-22 @ 10:00) (119/76 - 135/74)  RR: 18 (03-21-22 @ 10:00) (17 - 18)  SpO2: 98% (03-21-22 @ 10:00) (96% - 100%)    General: Well nourished in no acute distress. Alert and Oriented * 3.   Head: Normocephalic and atraumatic.   Neck: No JVD. No bruits. Supple. Does not appear to be enlarged.   Cardiovascular: + S1,S2 ; RRR Soft systolic murmur at the left lower sternal border. No rubs noted.    Lungs: CTA b/l. No rhonchi, rales or wheezes.   Abdomen: + BS, soft. Non tender. Non distended. No rebound. No guarding.   Extremities: + edema in LE bilaterally   Neurologic: Moves all four extremities. Full range of motion.   Skin: Warm and moist. The patient's skin has normal elasticity and good skin turgor.   Psychiatric: Appropriate mood and affect.  Musculoskeletal: Normal range of motion, normal strength    Tele: AF    < from: TTE with Doppler (w/Cont) (03.17.22 @ 11:13) >  1. Mitral annular calcification, otherwise normal mitral  valve. Mild mitral regurgitation.  2. Severely dilated left atrium.  LA volume index = 69  cc/m2.  3. Mild concentric left ventricular hypertrophy.  4. Endocardium not well visualized; grossly normal left  ventricular systolic function.  Endocardial visualization  enhanced with intravenous injection of echo contrast  (Definity).  5. Severe right atrial enlargement.  6. The right ventricle is not well visualized; grossly  normal right ventricular systolic function.  7. Estimated right ventricular systolic pressure equals 60  mm Hg, assuming right atrial pressure equals 10 mm Hg,  consistent with moderate pulmonary hypertension.    < end of copied text >      A/P: 71 year old male with PMH HTN, CKD, DM, Gout, AF on Coumadin, (OP Cardio is Eleno), who presented to ER from PMD where he was found to be hypertensive and admits to worsening SAENZ.  He reports chronic R knee pain that limits his exercise capacity but admits his SAENZ and LE edema has worsened over a few months.     -pt. admitted with volume overloaded however volume status has improved  -s/p IV diuresis, transitioned to PO  -TTE with normal LV function with moderate pulmonary HTN - pulm eval with Dr. Dan appreciated, recommends diuresis  -right knee x-ray and medicine eval noted - solumedrol per medicine started, much improved  -renal eval with Dr. Urrutia appreciated  -NST with no ischemia or infarct  -continue with coumadin for goal INR 2-3  -DC planning today, f/u Dr Johansen

## 2022-03-21 NOTE — PROGRESS NOTE ADULT - SUBJECTIVE AND OBJECTIVE BOX
Follow-up Pulm Progress Note    Appears comfortable.     Medications:  MEDICATIONS  (STANDING):  amLODIPine   Tablet 5 milliGRAM(s) Oral daily  atorvastatin 10 milliGRAM(s) Oral at bedtime  dextrose 40% Gel 15 Gram(s) Oral once  dextrose 5%. 1000 milliLiter(s) (50 mL/Hr) IV Continuous <Continuous>  dextrose 5%. 1000 milliLiter(s) (100 mL/Hr) IV Continuous <Continuous>  dextrose 50% Injectable 25 Gram(s) IV Push once  dextrose 50% Injectable 12.5 Gram(s) IV Push once  dextrose 50% Injectable 25 Gram(s) IV Push once  furosemide    Tablet 40 milliGRAM(s) Oral two times a day  glucagon  Injectable 1 milliGRAM(s) IntraMuscular once  hydrALAZINE 100 milliGRAM(s) Oral three times a day  influenza  Vaccine (HIGH DOSE) 0.7 milliLiter(s) IntraMuscular once  insulin lispro (ADMELOG) corrective regimen sliding scale   SubCutaneous three times a day before meals  insulin lispro (ADMELOG) corrective regimen sliding scale   SubCutaneous at bedtime  metoprolol succinate  milliGRAM(s) Oral daily  predniSONE   Tablet 20 milliGRAM(s) Oral daily    MEDICATIONS  (PRN):  acetaminophen     Tablet .. 650 milliGRAM(s) Oral every 6 hours PRN Temp greater or equal to 38C (100.4F), Mild Pain (1 - 3), Moderate Pain (4 - 6)    Vital Signs Last 24 Hrs  T(C): 36.9 (21 Mar 2022 10:00), Max: 36.9 (21 Mar 2022 01:35)  T(F): 98.4 (21 Mar 2022 10:00), Max: 98.5 (21 Mar 2022 01:35)  HR: 77 (21 Mar 2022 10:00) (62 - 77)  BP: 119/76 (21 Mar 2022 10:00) (119/76 - 135/74)  BP(mean): --  RR: 18 (21 Mar 2022 10:00) (17 - 18)  SpO2: 98% (21 Mar 2022 10:00) (96% - 100%)    03-20 @ 07:01  -  03-21 @ 07:00  --------------------------------------------------------  IN: 0 mL / OUT: 300 mL / NET: -300 mL    LABS:                        11.6   13.27 )-----------( 208      ( 21 Mar 2022 07:17 )             37.4     03-21    137  |  101  |  57<H>  ----------------------------<  103<H>  4.1   |  25  |  1.56<H>    Ca    8.5      21 Mar 2022 07:17  Phos  3.7     03-21  Mg     2.60     03-21    CAPILLARY BLOOD GLUCOSE      POCT Blood Glucose.: 107 mg/dL (21 Mar 2022 08:52)    PT/INR - ( 21 Mar 2022 07:17 )   PT: 30.7 sec;   INR: 2.62 ratio      CULTURES: (if applicable)    Physical Examination:  PULM: Decreased BS at bases  CVS: S1, S2 heard    RADIOLOGY REVIEWED  CXR:     CT chest:    TTE:

## 2022-03-21 NOTE — DISCHARGE NOTE NURSING/CASE MANAGEMENT/SOCIAL WORK - PATIENT PORTAL LINK FT
You can access the FollowMyHealth Patient Portal offered by [facility] by registering at the following website: [email or web address]. By joining [email or web address]’s FollowMyHealth portal, you will also be able to view your health information using other applications (apps) compatible with our system.
You can access the FollowMyHealth Patient Portal offered by [facility] by registering at the following website: [email or web address]. By joining [employer]’s FollowMyHealth portal, you will also be able to view your health information using other applications (apps) compatible with our system.

## 2022-03-21 NOTE — PROGRESS NOTE ADULT - SUBJECTIVE AND OBJECTIVE BOX
EP ATTENDING    tele: rate controlled AF  he denies palpitations, syncope, nor angina, ROS otherwise -  feeling well.  less leg swelling.  ambulating without difficulty.    DATE OF SERVICE - 03-21-22 @ 12:10    Review of Systems:   Constitutional: [ ] fevers, [ ] chills.   Skin: [ ] dry skin. [ ] rashes.  Psychiatric: [ ] depression, [ ] anxiety.   Gastrointestinal: [ ] BRBPR, [ ] melena.   Neurological: [ ] confusion. [ ] seizures. [ ] shuffling gait.   Ears,Nose,Mouth and Throat: [ ] ear pain [ ] sore throat.   Eyes: [ ] diplopia.   Respiratory: [ ] hemoptysis. [ ] shortness of breath  Cardiovascular: See HPI above  Hematologic/Lymphatic: [ ] anemia. [ ] painful nodes. [ ] prolonged bleeding.   Genitourinary: [ ] hematuria. [ ] flank pain.   Endocrine: [ ] significant change in weight. [ ] intolerance to heat and cold.     Review of systems [ x] otherwise negative, [ ] otherwise unable to obtain    FH: no family history of sudden cardiac death in first degree relatives    SH: [ ] tobacco, [ ] alcohol, [ ] drugs    acetaminophen     Tablet .. 650 milliGRAM(s) Oral every 6 hours PRN  amLODIPine   Tablet 5 milliGRAM(s) Oral daily  atorvastatin 10 milliGRAM(s) Oral at bedtime  dextrose 40% Gel 15 Gram(s) Oral once  dextrose 5%. 1000 milliLiter(s) IV Continuous <Continuous>  dextrose 5%. 1000 milliLiter(s) IV Continuous <Continuous>  dextrose 50% Injectable 25 Gram(s) IV Push once  dextrose 50% Injectable 12.5 Gram(s) IV Push once  dextrose 50% Injectable 25 Gram(s) IV Push once  furosemide    Tablet 40 milliGRAM(s) Oral two times a day  glucagon  Injectable 1 milliGRAM(s) IntraMuscular once  hydrALAZINE 100 milliGRAM(s) Oral three times a day  influenza  Vaccine (HIGH DOSE) 0.7 milliLiter(s) IntraMuscular once  insulin lispro (ADMELOG) corrective regimen sliding scale   SubCutaneous three times a day before meals  insulin lispro (ADMELOG) corrective regimen sliding scale   SubCutaneous at bedtime  metoprolol succinate  milliGRAM(s) Oral daily  predniSONE   Tablet 20 milliGRAM(s) Oral daily                            11.6   13.27 )-----------( 208      ( 21 Mar 2022 07:17 )             37.4       03-21    137  |  101  |  57<H>  ----------------------------<  103<H>  4.1   |  25  |  1.56<H>    Ca    8.5      21 Mar 2022 07:17  Phos  3.7     03-21  Mg     2.60     03-21      T(C): 36.9 (03-21-22 @ 10:00), Max: 36.9 (03-21-22 @ 01:35)  HR: 77 (03-21-22 @ 10:00) (62 - 77)  BP: 119/76 (03-21-22 @ 10:00) (119/76 - 135/74)  RR: 18 (03-21-22 @ 10:00) (17 - 18)  SpO2: 98% (03-21-22 @ 10:00) (96% - 100%)  Wt(kg): --    I&O's Summary    20 Mar 2022 07:01  -  21 Mar 2022 07:00  --------------------------------------------------------  IN: 0 mL / OUT: 300 mL / NET: -300 mL    General: Well nourished in no acute distress. Alert and Oriented * 3.   Head: Normocephalic and atraumatic.   Neck: No JVD. No bruits. Supple. Does not appear to be enlarged.   Cardiovascular: + S1,S2 ; IRR Soft systolic murmur at the left lower sternal border. No rubs noted.    Lungs: CTA b/l. No rhonchi, rales or wheezes.   Abdomen: + BS, soft. Non tender. Non distended. No rebound. No guarding.   Extremities: No clubbing/cyanosis/edema.   Neurologic: Moves all four extremities. Full range of motion.   Skin: Warm and moist. The patient's skin has normal elasticity and good skin turgor.   Psychiatric: Appropriate mood and affect.  Musculoskeletal: Normal range of motion, normal strength    Echo: moderate pulmonary HTN, otherwise unremarkable      A/P) 72 y/o male PMH HTN, mild CKD, DM, gout, AF (unclear if paroxysmal or persistent) now a/w uncontrolled hypertension from his PMD's office ([facility]). His cardiologist (Valdo Crowley) manages his AF with toprol 200 and coumadin. EP now called for help with AF rate control    -continue lifelong a/c  -continue toprol xl 200mg daily  -no further inpatient EP workup needed  -if there is any further AF with RVR then next step would be adding dig 0.125mg daily  -f/u with Carline after discharge  -hydralazine dose increased, with improvement in his BP measurements.  -followup w/ Dr Carline Tolbert M.D.  Cardiac Electrophysiology  [phone]

## 2022-03-21 NOTE — PROGRESS NOTE ADULT - ASSESSMENT
This is a 70 y/o M with pmhx of HTN, CHF, afib, DM presented to the ED for HTN emergency that was coupled with acute systolic heart failure   Morbid obesity   CKD stage 3a likely from HTN   Moderate PHTN that could be MAREN    1 Renal- mild proteinuria, creatinine improving   Continue Lasix 40mg po BID   2 CVS-bp controlled  3 Pulm- Outpt sleep study     No renal objection to discharge resume spironolactone 25mg po daily. Continue to hold ACEi. Patient should follow up with Dr. Urrutia in 2 weeks.     Ladonna Miller NP   [facility]   [phone]      This is a 72 y/o M with pmhx of HTN, CHF, afib, DM presented to the ED for HTN emergency that was coupled with acute systolic heart failure   Morbid obesity   CKD stage 3a likely from HTN   Moderate PHTN that could be MAREN    1 Renal- mild proteinuria, creatinine improving .  Continue Lasix 40mg po BID   2 CVS-bp controlled  3 Pulm- Outpt sleep study     No renal objection to discharge resume spironolactone 25mg po daily. Continue to hold ACEi. Patient should follow up with Dr. Urrutia in 2 weeks.     Ladonna Miller NP   [facility]   [phone]

## 2022-03-21 NOTE — PROGRESS NOTE ADULT - PROVIDER SPECIALTY LIST ADULT
Cardiology
Electrophysiology
Cardiology
Cardiology
Electrophysiology
Cardiology
Cardiology
Electrophysiology
Internal Medicine
Nephrology
Nephrology
Pulmonology
Internal Medicine

## 2022-03-21 NOTE — PROGRESS NOTE ADULT - PROBLEM SELECTOR PROBLEM 2
Stage 3 chronic kidney disease
Pulmonary hypertension due to left heart disease
Stage 3 chronic kidney disease
Hypertensive emergency
Hypertensive emergency

## 2022-03-21 NOTE — PROGRESS NOTE ADULT - PROBLEM SELECTOR PLAN 3
ruled out after admission   patient has CKD stage 3
improved  continue meds for now
ruled out after admission   patient has CKD stage 3
improved  continue meds for now

## 2022-03-21 NOTE — PROGRESS NOTE ADULT - PROBLEM SELECTOR PLAN 4
management per cardiology  tolerating furosemide   now on po 40 BID  clinically improving
management per cardiology  tolerating diet furosemide   clinically improving
management per cardiology  tolerating furosemide   now on po 40 BID  appears euvolemic
management per cardiology  tolerating furosemide   follow up cardiology  stress negative   echocardiogram normal

## 2022-03-21 NOTE — PROGRESS NOTE ADULT - PROBLEM SELECTOR PROBLEM 1
Acute systolic congestive heart failure
Gouty crystallopathy

## 2022-03-21 NOTE — DISCHARGE NOTE PROVIDER - HOSPITAL COURSE
72 y/o M with pmhx of HTN, CHF, afib, DM presented to the ED for HTN from clinic. 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 Admit for HTN emergency, RERE and ACS work up.     Pt. admitted with volume overloaded however volume status has improved s/p IV diuresis, transitioned to PO, TTE with normal LV function with moderate pulmonary HTN - pulm eval with Dr. Dan appreciated, recommends diuresis. NST with no ischemia or infarct. Continue with coumadin for goal INR 2-3. BP now improved on current regimen.     Renal eval with Dr. Urrutia for CKD. Meds [id number] will hold ACEi on d/c, outpt f/u     Pt reports chronic R knee pain that limits his exercise capacity but admits his SAENZ and LE edema has worsened over a few months. Likely Gout, xray performed. Pain improved s/p IV steroids, will complete course of IV steroids. Two more doses of prednisone 20 mg qd after discharge, no other intervention at this time. Outpatient follow up with PCP for long term gout control therapy.    No renal objection to discharge resume spironolactone 25mg po daily. Continue to hold ACEi. Patient should follow up with Dr. Urrutia in 2 weeks.   Discussed case with cardiology, pt cleared for discharge home  Pt with HR occasionally drop to 40s Afib, non-sustained, pt is asymptomatic, cardiology aware continue current dose of Metoprolol, no change in management, cleared for discharge home.

## 2022-03-21 NOTE — PROGRESS NOTE ADULT - PROBLEM SELECTOR PLAN 1
X rays negative for fracture  likely gout vs pseudogout  will continue to monitor  solumedrol IV 30 mg x 1 dose
much improved  will give another dose solumedrol IV  20 mg x 1  uric acid ~ 9 helpful for likely gout as etiology
diuresis as per cards
80% resolved per patient   will give prednisone 20 po qd x 2 days   then 10 po qd x 2 days
two more doses of prednisone 20 mg qd after discharge   no other intervention at this time   outpatient follow up with PCP for long term gout control therapy

## 2022-03-21 NOTE — DISCHARGE NOTE PROVIDER - NSDCCPCAREPLAN_GEN_ALL_CORE_FT
PRINCIPAL DISCHARGE DIAGNOSIS  Diagnosis: Hypertensive urgency  Assessment and Plan of Treatment: Your blood pressure was uncontrolled on admission but is now much improved. Continue current regimen as prescibed. Follow up with PCP and nephrology Dr. Urrutia. Monitor your blood pressure at home.      SECONDARY DISCHARGE DIAGNOSES  Diagnosis: Pulmonary hypertension due to left heart disease  Assessment and Plan of Treatment: Echocardiogram showing severe pulmonary hypertension. COntinue low salt diet, fluid restriction to 1500 ml daily, monitor your fluid intake and weight daily, exercise as tolerated 30 minutes daily, and follow up with your primary care physician and cardiologist within 1 to 2 weeks. You may also see pulmonologist for monitoring.    Diagnosis: Stage 3 chronic kidney disease  Assessment and Plan of Treatment: You have chronic kidney disease. Continue blood pressure medciations. Goal of hemoglobin A1C (HgbA1C) < 7%. Your A1C is 5.5, no indication for oral diabetes medication at this time, please follow up with PCP for routine check.  Avoid nephrotoxic drugs such as nonsteroidal anti-inflammatory agents (NSAIDs).   Please follow up with Dr. Urrutia in 2 weeksto monitor your kidney function, continue with low protein and potassium diet.    Diagnosis: Gouty crystallopathy  Assessment and Plan of Treatment: Continue Prednisone x 2 more days - next dose tomorrow 3/22. Follow up with your primary care physician for further monitoring in 1-2 weeks. Please call to arrange appointment.    Diagnosis: Chronic atrial fibrillation  Assessment and Plan of Treatment: No changes to your Coumadin dosing are being recommended at this time. Continue Coumadin home dosing as prescibed previously by your primary care doctor. Follow up for routine INR checks.

## 2022-03-21 NOTE — DISCHARGE NOTE PROVIDER - NSDCMRMEDTOKEN_GEN_ALL_CORE_FT
amLODIPine 5 mg oral tablet: 1 tab(s) orally once a day  atorvastatin 10 mg oral tablet: 1 tab(s) orally once a day  colchicine 0.6 mg oral tablet: 2 tab(s) orally at time of gout attack, then one more tab after 1 hour  furosemide 40 mg oral tablet: 1 tab(s) orally 2 times a day  hydrALAZINE 100 mg oral tablet: 1 tab(s) orally 3 times a day  metoprolol succinate 200 mg oral capsule, extended release: 1 cap(s) orally once a day  predniSONE 20 mg oral tablet: 1 tab(s) orally once a day  spironolactone 25 mg oral tablet: 1 tab(s) orally 2 times a day  warfarin 1 mg oral tablet: 1 tab(s) orally on Thur-Sunday Night with 7.5mg tablet  warfarin 7.5 mg oral tablet: 1 tab(s) orally once a day

## 2022-03-21 NOTE — DISCHARGE NOTE PROVIDER - NSDCFUADDAPPT_GEN_ALL_CORE_FT
1. Follow up with your primary care doctor in 1-2 weeks for INR and blood pressure check    2. Follow up with Nephrology, [facility] specialist Dr. Urrutia in 2 weeks, please let the office know you were just discharged from the hospital and request an appointment in 2 weeks. Dr. Urrutia wants to see you in the office to see if adjustments need to be made to your blood pressure regimen.     3. Follow up with your cardiologist Dr. Crowley after discharge    4. Follow up with pulmonary medicine for monitoring of pulmonary hypertension

## 2022-03-21 NOTE — PROGRESS NOTE ADULT - SUBJECTIVE AND OBJECTIVE BOX
[facility] [phone]        Patient seen and examined no new complaints, resting comfortably on room air.         MEDICATIONS  (STANDING):  amLODIPine   Tablet 5 milliGRAM(s) Oral daily  atorvastatin 10 milliGRAM(s) Oral at bedtime  dextrose 40% Gel 15 Gram(s) Oral once  dextrose 5%. 1000 milliLiter(s) (50 mL/Hr) IV Continuous <Continuous>  dextrose 5%. 1000 milliLiter(s) (100 mL/Hr) IV Continuous <Continuous>  dextrose 50% Injectable 25 Gram(s) IV Push once  dextrose 50% Injectable 12.5 Gram(s) IV Push once  dextrose 50% Injectable 25 Gram(s) IV Push once  furosemide    Tablet 40 milliGRAM(s) Oral two times a day  glucagon  Injectable 1 milliGRAM(s) IntraMuscular once  hydrALAZINE 100 milliGRAM(s) Oral three times a day  influenza  Vaccine (HIGH DOSE) 0.7 milliLiter(s) IntraMuscular once  insulin lispro (ADMELOG) corrective regimen sliding scale   SubCutaneous three times a day before meals  insulin lispro (ADMELOG) corrective regimen sliding scale   SubCutaneous at bedtime  metoprolol succinate  milliGRAM(s) Oral daily  predniSONE   Tablet 20 milliGRAM(s) Oral daily      VITAL:  T(C): , Max: 37 (03-21-22 @ 12:07)  T(F): , Max: 98.6 (03-21-22 @ 12:07)  HR: 64 (03-21-22 @ 14:29)  BP: 119/62 (03-21-22 @ 14:29)  BP(mean): --  RR: 18 (03-21-22 @ 14:29)  SpO2: 99% (03-21-22 @ 14:29)  Wt(kg): --    I and O's:    03-20 @ 07:01  -  03-21 @ 07:00  --------------------------------------------------------  IN: 0 mL / OUT: 300 mL / NET: -300 mL          PHYSICAL EXAM:    Constitutional: NAD  Neck:  No JVD  Respiratory: CTAB/L  Cardiovascular: S1 and S2  Gastrointestinal: BS+, soft, NT/ND  Extremities: No peripheral edema  Neurological: A/O x 3, no focal deficits  Psychiatric: Normal mood, normal affect  : No Mendez  Skin: No rashes  Access: Not applicable    LABS:                        11.6   13.27 )-----------( 208      ( 21 Mar 2022 07:17 )             37.4     03-21    137  |  101  |  57<H>  ----------------------------<  103<H>  4.1   |  25  |  1.56<H>    Ca    8.5      21 Mar 2022 07:17  Phos  3.7     03-21  Mg     2.60     03-21                     [facility] [phone]        Patient seen and examined no new complaints, resting comfortably on room air.         MEDICATIONS  (STANDING):  amLODIPine   Tablet 5 milliGRAM(s) Oral daily.  atorvastatin 10 milliGRAM(s) Oral at bedtime  dextrose 40% Gel 15 Gram(s) Oral once  dextrose 5%. 1000 milliLiter(s) (50 mL/Hr) IV Continuous <Continuous>  dextrose 5%. 1000 milliLiter(s) (100 mL/Hr) IV Continuous <Continuous>  dextrose 50% Injectable 25 Gram(s) IV Push once  dextrose 50% Injectable 12.5 Gram(s) IV Push once  dextrose 50% Injectable 25 Gram(s) IV Push once  furosemide    Tablet 40 milliGRAM(s) Oral two times a day  glucagon  Injectable 1 milliGRAM(s) IntraMuscular once  hydrALAZINE 100 milliGRAM(s) Oral three times a day  influenza  Vaccine (HIGH DOSE) 0.7 milliLiter(s) IntraMuscular once  insulin lispro (ADMELOG) corrective regimen sliding scale   SubCutaneous three times a day before meals  insulin lispro (ADMELOG) corrective regimen sliding scale   SubCutaneous at bedtime  metoprolol succinate  milliGRAM(s) Oral daily  predniSONE   Tablet 20 milliGRAM(s) Oral daily      VITAL:  T(C): , Max: 37 (03-21-22 @ 12:07)  T(F): , Max: 98.6 (03-21-22 @ 12:07)  HR: 64 (03-21-22 @ 14:29)  BP: 119/62 (03-21-22 @ 14:29)  BP(mean): --  RR: 18 (03-21-22 @ 14:29)  SpO2: 99% (03-21-22 @ 14:29)  Wt(kg): --    I and O's:    03-20 @ 07:01  -  03-21 @ 07:00  --------------------------------------------------------  IN: 0 mL / OUT: 300 mL / NET: -300 mL          PHYSICAL EXAM:    Constitutional: NAD  Neck:  No JVD  Respiratory: CTAB/L  Cardiovascular: S1 and S2  Gastrointestinal: BS+, soft, NT/ND  Extremities: No peripheral edema  Neurological: A/O x 3, no focal deficits  Psychiatric: Normal mood, normal affect  : No Mendez  Skin: No rashes  Access: Not applicable    LABS:                        11.6   13.27 )-----------( 208      ( 21 Mar 2022 07:17 )             37.4     03-21    137  |  101  |  57<H>  ----------------------------<  103<H>  4.1   |  25  |  1.56<H>    Ca    8.5      21 Mar 2022 07:17  Phos  3.7     03-21  Mg     2.60     03-21

## 2022-03-21 NOTE — PROVIDER CONTACT NOTE (OTHER) - REASON
Patients /96
Pt /102
Pt heart rate dropped to 45 for 6 seconds, and had a 2 second Pause on monitor
Provider contacted RN to hold Hydralazine at this time.
Pt /95

## 2022-03-21 NOTE — PROVIDER CONTACT NOTE (OTHER) - ASSESSMENT
Pt asymptomatic, denies SOB or chest pain. Due for Hydralazine and lasix IV push
No distress noted. No c/o chest pain/sob/palptations
Pt asymptomatic
Pt asymptomatic, denies SOB or chest pain. STAT dose of amlodipine given
patient A&Ox4, patient denies pain, chest pain, sob, blurry vision, headache, n/v or dizziness.   /70 . HR 86

## 2022-03-21 NOTE — PROGRESS NOTE ADULT - SUBJECTIVE AND OBJECTIVE BOX
Patient is a 71y old  Male who presents with a chief complaint of HTN emergency (21 Mar 2022 14:34)      DATE OF SERVICE: 03-21-22 @ 15:27    SUBJECTIVE / OVERNIGHT EVENTS: overnight events noted    ROS:  Resp: No cough no sputum production  CVS: No chest pain no palpitations no orthopnea  GI: no N/V/D  Msk: No joint pain no swelling  Skin: No rash no itching        MEDICATIONS  (STANDING):  amLODIPine   Tablet 5 milliGRAM(s) Oral daily  atorvastatin 10 milliGRAM(s) Oral at bedtime  dextrose 40% Gel 15 Gram(s) Oral once  dextrose 5%. 1000 milliLiter(s) (50 mL/Hr) IV Continuous <Continuous>  dextrose 5%. 1000 milliLiter(s) (100 mL/Hr) IV Continuous <Continuous>  dextrose 50% Injectable 25 Gram(s) IV Push once  dextrose 50% Injectable 12.5 Gram(s) IV Push once  dextrose 50% Injectable 25 Gram(s) IV Push once  furosemide    Tablet 40 milliGRAM(s) Oral two times a day  glucagon  Injectable 1 milliGRAM(s) IntraMuscular once  hydrALAZINE 100 milliGRAM(s) Oral three times a day  influenza  Vaccine (HIGH DOSE) 0.7 milliLiter(s) IntraMuscular once  insulin lispro (ADMELOG) corrective regimen sliding scale   SubCutaneous three times a day before meals  insulin lispro (ADMELOG) corrective regimen sliding scale   SubCutaneous at bedtime  metoprolol succinate  milliGRAM(s) Oral daily  predniSONE   Tablet 20 milliGRAM(s) Oral daily    MEDICATIONS  (PRN):  acetaminophen     Tablet .. 650 milliGRAM(s) Oral every 6 hours PRN Temp greater or equal to 38C (100.4F), Mild Pain (1 - 3), Moderate Pain (4 - 6)        CAPILLARY BLOOD GLUCOSE      POCT Blood Glucose.: 158 mg/dL (21 Mar 2022 12:31)  POCT Blood Glucose.: 107 mg/dL (21 Mar 2022 08:52)  POCT Blood Glucose.: 149 mg/dL (20 Mar 2022 22:35)  POCT Blood Glucose.: 171 mg/dL (20 Mar 2022 17:42)    I&O's Summary    20 Mar 2022 07:01  -  21 Mar 2022 07:00  --------------------------------------------------------  IN: 0 mL / OUT: 300 mL / NET: -300 mL        Vital Signs Last 24 Hrs  T(C): 36.9 (21 Mar 2022 14:29), Max: 37 (21 Mar 2022 12:07)  T(F): 98.4 (21 Mar 2022 14:29), Max: 98.6 (21 Mar 2022 12:07)  HR: 64 (21 Mar 2022 14:29) (62 - 77)  BP: 119/62 (21 Mar 2022 14:29) (116/79 - 135/74)  BP(mean): --  RR: 18 (21 Mar 2022 14:29) (17 - 18)  SpO2: 99% (21 Mar 2022 14:29) (96% - 100%)    PHYSICAL EXAM:  EYES: EOMI, PERRLA  NECK: Supple, No JVD  CHEST/LUNG: clear  HEART: S1 S2; no murmurs   ABDOMEN: Soft, Nontender  EXTREMITIES:  trace bilateral edema  NEUROLOGY: AO x 3 non-focal  SKIN: No rashes or lesions  MSK: right knee resolved pain    LABS:                        11.6   13.27 )-----------( 208      ( 21 Mar 2022 07:17 )             37.4     03-21    137  |  101  |  57<H>  ----------------------------<  103<H>  4.1   |  25  |  1.56<H>    Ca    8.5      21 Mar 2022 07:17  Phos  3.7     03-21  Mg     2.60     03-21      PT/INR - ( 21 Mar 2022 07:17 )   PT: 30.7 sec;   INR: 2.62 ratio                     All consultant(s) notes reviewed and care discussed with other providers        Contact Number, Dr Holder [phone]

## 2022-03-21 NOTE — PROVIDER CONTACT NOTE (OTHER) - ACTION/TREATMENT ORDERED:
Given nighttime hydralazine and recheck BP in an hour
Provider contacted RN to hold Hydralazine at this time.
Recheck BP in one hour post medication
Continue to monitor pt
Give medications and recheck BP in an hour

## 2022-03-21 NOTE — PROGRESS NOTE ADULT - PROBLEM SELECTOR PLAN 5
continue warfarin give increased dose 10 today   INR therapeutic but borderline  INR in AM
INR therapeutic   resume 7.5 po qd warfarin
continue warfarin   will continue to monitor   INR therapeutic
INR 2.2  resume 7.5 po qd warfarin

## 2022-03-21 NOTE — PROGRESS NOTE ADULT - ATTENDING COMMENTS
Renal attd    -Pt was seen with NP and agree with above  -DC planning and outpt follow up in 2 weeks.  pt given my card    Sayed [facility]   [phone]

## 2022-03-30 LAB — RENIN PLAS-CCNC: 0.36 NG/ML/HR — SIGNIFICANT CHANGE UP (ref 0.17–5.38)

## 2022-05-26 ENCOUNTER — APPOINTMENT (OUTPATIENT)
Dept: PULMONOLOGY | Facility: CLINIC | Age: 72
End: 2022-05-26
Payer: MEDICARE

## 2022-05-26 VITALS
SYSTOLIC BLOOD PRESSURE: 150 MMHG | HEIGHT: 69 IN | WEIGHT: 294 LBS | HEART RATE: 89 BPM | OXYGEN SATURATION: 96 % | TEMPERATURE: 98 F | BODY MASS INDEX: 43.55 KG/M2 | DIASTOLIC BLOOD PRESSURE: 76 MMHG

## 2022-05-26 DIAGNOSIS — Z86.79 PERSONAL HISTORY OF OTHER DISEASES OF THE CIRCULATORY SYSTEM: ICD-10-CM

## 2022-05-26 PROCEDURE — 94729 DIFFUSING CAPACITY: CPT

## 2022-05-26 PROCEDURE — 99205 OFFICE O/P NEW HI 60 MIN: CPT | Mod: 25

## 2022-05-26 PROCEDURE — 94727 GAS DIL/WSHOT DETER LNG VOL: CPT

## 2022-05-26 PROCEDURE — 94060 EVALUATION OF WHEEZING: CPT

## 2022-05-26 RX ORDER — COLCHICINE 0.6 MG/1
0.6 TABLET, FILM COATED ORAL
Refills: 0 | Status: ACTIVE | COMMUNITY

## 2022-05-26 RX ORDER — METOPROLOL TARTRATE 75 MG/1
TABLET, FILM COATED ORAL
Refills: 0 | Status: COMPLETED | COMMUNITY
End: 2022-05-26

## 2022-05-26 RX ORDER — HYDRALAZINE HYDROCHLORIDE 100 MG/1
100 TABLET ORAL
Refills: 0 | Status: ACTIVE | COMMUNITY

## 2022-05-26 RX ORDER — FUROSEMIDE 20 MG/1
20 TABLET ORAL
Refills: 0 | Status: ACTIVE | COMMUNITY

## 2022-05-26 RX ORDER — METFORMIN HYDROCHLORIDE 500 MG/1
500 TABLET, COATED ORAL
Refills: 0 | Status: COMPLETED | COMMUNITY
End: 2022-05-26

## 2022-05-26 RX ORDER — HYDROCHLOROTHIAZIDE 12.5 MG/1
TABLET ORAL
Refills: 0 | Status: COMPLETED | COMMUNITY
End: 2022-05-26

## 2022-05-26 RX ORDER — NIFEDIPINE 90 MG
90 TABLET, EXTENDED RELEASE ORAL
Refills: 0 | Status: COMPLETED | COMMUNITY
End: 2022-05-26

## 2022-05-26 RX ORDER — METOPROLOL SUCCINATE 200 MG/1
200 TABLET, EXTENDED RELEASE ORAL
Refills: 0 | Status: ACTIVE | COMMUNITY

## 2022-05-26 RX ORDER — AMLODIPINE BESYLATE 10 MG/1
10 TABLET ORAL
Refills: 0 | Status: ACTIVE | COMMUNITY

## 2022-05-26 RX ORDER — LISINOPRIL 40 MG/1
40 TABLET ORAL
Refills: 0 | Status: COMPLETED | COMMUNITY
End: 2022-05-26

## 2022-05-26 RX ORDER — ATORVASTATIN CALCIUM 10 MG/1
10 TABLET, FILM COATED ORAL
Refills: 0 | Status: ACTIVE | COMMUNITY

## 2022-05-26 RX ORDER — SPIRONOLACTONE 25 MG/1
25 TABLET ORAL
Refills: 0 | Status: ACTIVE | COMMUNITY

## 2022-05-26 NOTE — DISCUSSION/SUMMARY
[FreeTextEntry1] : He is a 71 year old man with a history of hypertension, hyperlipidemia, atrial fibrillation and CHF. No prior history of pulmonary disease. He never smoked. The occupational history was unremarkable. \par \par He was hospitalized for CHF. Had been complaining of SAENZ. This has improved. No complaint of cough, wheezing or shortness of breath at rest. No chest pain, pressure or palpitations. No fever, chills or sweats. No sick contacts. \par \par The physical examination was unremarkable except for elevated BMI of 43. No COPD on PFT. Restriction likely due to body habitus. Weight loss advised. Follow up with Cardiology. \par \par Follow up as needed. \par \par

## 2022-05-26 NOTE — HISTORY OF PRESENT ILLNESS
[Never] : never [TextBox_4] : He is a 71 year old man with a history of hypertension, hyperlipidemia, atrial fibrillation and CHF. No prior history of pulmonary disease. He never smoked. The occupational history was unremarkable. \par \par He was hospitalized for CHF. Had been complaining of SAENZ. This has improved. No complaint of cough, wheezing or shortness of breath at rest. No chest pain, pressure or palpitations. No fever, chills or sweats. No sick contacts. \par  [Difficulty Initiating Sleep] : does not have difficulty initiating sleep [Difficulty Maintaining Sleep] : does not have difficulty maintaining sleep

## 2022-05-26 NOTE — REVIEW OF SYSTEMS
[Fever] : no fever [Fatigue] : no fatigue [Epistaxis] : no epistaxis [Postnasal Drip] : no postnasal drip [Cough] : no cough [Dyspnea] : no dyspnea [Chest Discomfort] : no chest discomfort [Edema] : edema [Nasal Discharge] : no nasal discharge [GERD] : no gerd [Myalgias] : no myalgias [Back Pain] : no back pain [Rash] : no rash [Anemia] : no anemia [Headache] : no headache [Anxiety] : no anxiety [Diabetes] : no diabetes [Thyroid Problem] : no thyroid problem

## 2022-05-26 NOTE — PHYSICAL EXAM
[No Acute Distress] : no acute distress [No Neck Mass] : no neck mass [Normal S1, S2] : normal s1, s2 [Clear to Auscultation Bilaterally] : clear to auscultation bilaterally [No HSM] : no hsm [No Cyanosis] : no cyanosis [1+ Pitting] : 1+ pitting [Normal Turgor] : normal turgor [Oriented x3] : oriented x3

## 2022-05-26 NOTE — PROCEDURE
[FreeTextEntry1] : PFT 5/26/22: No obstruction. Moderate restriction. Diffusion capacity was normal. Mild improvement after bronchodilator.

## 2023-05-30 NOTE — ASU PATIENT PROFILE, ADULT - FALL HARM RISK - UNIVERSAL INTERVENTIONS
Bed in lowest position, wheels locked, appropriate side rails in place/Call bell, personal items and telephone in reach/Instruct patient to call for assistance before getting out of bed or chair/Non-slip footwear when patient is out of bed/[place] to call system/Physically safe environment - no spills, clutter or unnecessary equipment/Purposeful Proactive Rounding/Room/bathroom lighting operational, light cord in reach

## 2023-05-31 ENCOUNTER — TRANSCRIPTION ENCOUNTER (OUTPATIENT)
Age: 73
End: 2023-05-31

## 2023-05-31 ENCOUNTER — OUTPATIENT (OUTPATIENT)
Dept: OUTPATIENT SERVICES | Facility: HOSPITAL | Age: 73
LOS: 1 days | End: 2023-05-31
Payer: COMMERCIAL

## 2023-05-31 VITALS
OXYGEN SATURATION: 100 % | HEART RATE: 77 BPM | TEMPERATURE: 98 F | HEIGHT: 72 IN | DIASTOLIC BLOOD PRESSURE: 69 MMHG | RESPIRATION RATE: 19 BRPM | SYSTOLIC BLOOD PRESSURE: 136 MMHG | WEIGHT: 300.05 LBS

## 2023-05-31 VITALS
SYSTOLIC BLOOD PRESSURE: 102 MMHG | RESPIRATION RATE: 15 BRPM | HEART RATE: 70 BPM | DIASTOLIC BLOOD PRESSURE: 63 MMHG | OXYGEN SATURATION: 98 %

## 2023-05-31 DIAGNOSIS — K92.2 GASTROINTESTINAL HEMORRHAGE, UNSPECIFIED: ICD-10-CM

## 2023-05-31 DIAGNOSIS — Z87.828 PERSONAL HISTORY OF OTHER (HEALED) PHYSICAL INJURY AND TRAUMA: Chronic | ICD-10-CM

## 2023-05-31 PROCEDURE — 88305 TISSUE EXAM BY PATHOLOGIST: CPT | Mod: 26

## 2023-05-31 PROCEDURE — 43239 EGD BIOPSY SINGLE/MULTIPLE: CPT

## 2023-05-31 PROCEDURE — 45385 COLONOSCOPY W/LESION REMOVAL: CPT

## 2023-05-31 PROCEDURE — 88312 SPECIAL STAINS GROUP 1: CPT

## 2023-05-31 PROCEDURE — 88305 TISSUE EXAM BY PATHOLOGIST: CPT

## 2023-05-31 PROCEDURE — 45380 COLONOSCOPY AND BIOPSY: CPT | Mod: XS

## 2023-05-31 PROCEDURE — 88312 SPECIAL STAINS GROUP 1: CPT | Mod: 26

## 2023-05-31 RX ORDER — SODIUM CHLORIDE 9 MG/ML
500 INJECTION INTRAMUSCULAR; INTRAVENOUS; SUBCUTANEOUS
Refills: 0 | Status: DISCONTINUED | OUTPATIENT
Start: 2023-05-31 | End: 2023-06-14

## 2023-05-31 NOTE — ASU DISCHARGE PLAN (ADULT/PEDIATRIC) - NS MD DC FALL RISK RISK
For information on Fall & Injury Prevention, visit: [email or web address] OR  [email or web address] OR  https://www.cdc.gov/steadi/patient.html

## 2023-06-02 LAB — SURGICAL PATHOLOGY STUDY: SIGNIFICANT CHANGE UP

## 2024-02-29 ENCOUNTER — INPATIENT (INPATIENT)
Facility: HOSPITAL | Age: 74
LOS: 7 days | Discharge: HOME | End: 2024-03-08
Attending: STUDENT IN AN ORGANIZED HEALTH CARE EDUCATION/TRAINING PROGRAM | Admitting: STUDENT IN AN ORGANIZED HEALTH CARE EDUCATION/TRAINING PROGRAM
Payer: MEDICARE

## 2024-02-29 VITALS
OXYGEN SATURATION: 99 % | HEART RATE: 69 BPM | SYSTOLIC BLOOD PRESSURE: 124 MMHG | RESPIRATION RATE: 24 BRPM | DIASTOLIC BLOOD PRESSURE: 77 MMHG | TEMPERATURE: 98 F

## 2024-02-29 DIAGNOSIS — Z87.828 PERSONAL HISTORY OF OTHER (HEALED) PHYSICAL INJURY AND TRAUMA: Chronic | ICD-10-CM

## 2024-02-29 DIAGNOSIS — N18.9 CHRONIC KIDNEY DISEASE, UNSPECIFIED: ICD-10-CM

## 2024-02-29 DIAGNOSIS — E11.9 TYPE 2 DIABETES MELLITUS WITHOUT COMPLICATIONS: ICD-10-CM

## 2024-02-29 DIAGNOSIS — I10 ESSENTIAL (PRIMARY) HYPERTENSION: ICD-10-CM

## 2024-02-29 DIAGNOSIS — I48.91 UNSPECIFIED ATRIAL FIBRILLATION: ICD-10-CM

## 2024-02-29 DIAGNOSIS — M79.89 OTHER SPECIFIED SOFT TISSUE DISORDERS: ICD-10-CM

## 2024-02-29 DIAGNOSIS — Z79.899 OTHER LONG TERM (CURRENT) DRUG THERAPY: ICD-10-CM

## 2024-02-29 DIAGNOSIS — I50.9 HEART FAILURE, UNSPECIFIED: ICD-10-CM

## 2024-02-29 LAB
ADD ON TEST-SPECIMEN IN LAB: SIGNIFICANT CHANGE UP
ALBUMIN SERPL ELPH-MCNC: 4.1 G/DL — SIGNIFICANT CHANGE UP (ref 3.3–5)
ALP SERPL-CCNC: 110 U/L — SIGNIFICANT CHANGE UP (ref 40–120)
ALT FLD-CCNC: 23 U/L — SIGNIFICANT CHANGE UP (ref 4–41)
ANION GAP SERPL CALC-SCNC: 9 MMOL/L — SIGNIFICANT CHANGE UP (ref 7–14)
AST SERPL-CCNC: 27 U/L — SIGNIFICANT CHANGE UP (ref 4–40)
BASE EXCESS BLDV CALC-SCNC: -0.4 MMOL/L — SIGNIFICANT CHANGE UP (ref -2–3)
BASOPHILS # BLD AUTO: 0.03 K/UL — SIGNIFICANT CHANGE UP (ref 0–0.2)
BASOPHILS NFR BLD AUTO: 0.6 % — SIGNIFICANT CHANGE UP (ref 0–2)
BILIRUB SERPL-MCNC: 0.4 MG/DL — SIGNIFICANT CHANGE UP (ref 0.2–1.2)
BLOOD GAS VENOUS COMPREHENSIVE RESULT: SIGNIFICANT CHANGE UP
BUN SERPL-MCNC: 29 MG/DL — HIGH (ref 7–23)
CALCIUM SERPL-MCNC: 8.7 MG/DL — SIGNIFICANT CHANGE UP (ref 8.4–10.5)
CHLORIDE BLDV-SCNC: 109 MMOL/L — HIGH (ref 96–108)
CHLORIDE SERPL-SCNC: 109 MMOL/L — HIGH (ref 98–107)
CO2 BLDV-SCNC: 30.7 MMOL/L — HIGH (ref 22–26)
CO2 SERPL-SCNC: 26 MMOL/L — SIGNIFICANT CHANGE UP (ref 22–31)
CREAT SERPL-MCNC: 1.67 MG/DL — HIGH (ref 0.5–1.3)
EGFR: 43 ML/MIN/1.73M2 — LOW
EOSINOPHIL # BLD AUTO: 0.03 K/UL — SIGNIFICANT CHANGE UP (ref 0–0.5)
EOSINOPHIL NFR BLD AUTO: 0.6 % — SIGNIFICANT CHANGE UP (ref 0–6)
GAS PNL BLDV: 142 MMOL/L — SIGNIFICANT CHANGE UP (ref 136–145)
GLUCOSE BLDC GLUCOMTR-MCNC: 142 MG/DL — HIGH (ref 70–99)
GLUCOSE BLDC GLUCOMTR-MCNC: 172 MG/DL — HIGH (ref 70–99)
GLUCOSE BLDV-MCNC: 139 MG/DL — HIGH (ref 70–99)
GLUCOSE SERPL-MCNC: 146 MG/DL — HIGH (ref 70–99)
HCO3 BLDV-SCNC: 29 MMOL/L — SIGNIFICANT CHANGE UP (ref 22–29)
HCT VFR BLD CALC: 35.3 % — LOW (ref 39–50)
HCT VFR BLDA CALC: 34 % — LOW (ref 39–51)
HGB BLD CALC-MCNC: 11.4 G/DL — LOW (ref 12.6–17.4)
HGB BLD-MCNC: 11.1 G/DL — LOW (ref 13–17)
IANC: 4.11 K/UL — SIGNIFICANT CHANGE UP (ref 1.8–7.4)
IMM GRANULOCYTES NFR BLD AUTO: 0.4 % — SIGNIFICANT CHANGE UP (ref 0–0.9)
LACTATE BLDV-MCNC: 1.3 MMOL/L — SIGNIFICANT CHANGE UP (ref 0.5–2)
LYMPHOCYTES # BLD AUTO: 0.59 K/UL — LOW (ref 1–3.3)
LYMPHOCYTES # BLD AUTO: 10.9 % — LOW (ref 13–44)
MCHC RBC-ENTMCNC: 26.6 PG — LOW (ref 27–34)
MCHC RBC-ENTMCNC: 31.4 GM/DL — LOW (ref 32–36)
MCV RBC AUTO: 84.4 FL — SIGNIFICANT CHANGE UP (ref 80–100)
MONOCYTES # BLD AUTO: 0.64 K/UL — SIGNIFICANT CHANGE UP (ref 0–0.9)
MONOCYTES NFR BLD AUTO: 11.8 % — SIGNIFICANT CHANGE UP (ref 2–14)
NEUTROPHILS # BLD AUTO: 4.11 K/UL — SIGNIFICANT CHANGE UP (ref 1.8–7.4)
NEUTROPHILS NFR BLD AUTO: 75.7 % — SIGNIFICANT CHANGE UP (ref 43–77)
NRBC # BLD: 0 /100 WBCS — SIGNIFICANT CHANGE UP (ref 0–0)
NRBC # FLD: 0 K/UL — SIGNIFICANT CHANGE UP (ref 0–0)
PCO2 BLDV: 70 MMHG — HIGH (ref 42–55)
PH BLDV: 7.22 — LOW (ref 7.32–7.43)
PLATELET # BLD AUTO: 120 K/UL — LOW (ref 150–400)
PO2 BLDV: 26 MMHG — SIGNIFICANT CHANGE UP (ref 25–45)
POTASSIUM BLDV-SCNC: 5.4 MMOL/L — HIGH (ref 3.5–5.1)
POTASSIUM SERPL-MCNC: 5.4 MMOL/L — HIGH (ref 3.5–5.3)
POTASSIUM SERPL-SCNC: 5.4 MMOL/L — HIGH (ref 3.5–5.3)
PROT SERPL-MCNC: 7.6 G/DL — SIGNIFICANT CHANGE UP (ref 6–8.3)
RBC # BLD: 4.18 M/UL — LOW (ref 4.2–5.8)
RBC # FLD: 17.8 % — HIGH (ref 10.3–14.5)
SAO2 % BLDV: 38.7 % — LOW (ref 67–88)
SODIUM SERPL-SCNC: 144 MMOL/L — SIGNIFICANT CHANGE UP (ref 135–145)
TROPONIN T, HIGH SENSITIVITY RESULT: 38 NG/L — SIGNIFICANT CHANGE UP
WBC # BLD: 5.42 K/UL — SIGNIFICANT CHANGE UP (ref 3.8–10.5)
WBC # FLD AUTO: 5.42 K/UL — SIGNIFICANT CHANGE UP (ref 3.8–10.5)

## 2024-02-29 PROCEDURE — 99223 1ST HOSP IP/OBS HIGH 75: CPT

## 2024-02-29 PROCEDURE — 99285 EMERGENCY DEPT VISIT HI MDM: CPT

## 2024-02-29 PROCEDURE — 71046 X-RAY EXAM CHEST 2 VIEWS: CPT | Mod: 26

## 2024-02-29 RX ORDER — ALLOPURINOL 300 MG
100 TABLET ORAL DAILY
Refills: 0 | Status: DISCONTINUED | OUTPATIENT
Start: 2024-02-29 | End: 2024-03-08

## 2024-02-29 RX ORDER — FUROSEMIDE 40 MG
40 TABLET ORAL ONCE
Refills: 0 | Status: COMPLETED | OUTPATIENT
Start: 2024-02-29 | End: 2024-02-29

## 2024-02-29 RX ORDER — WARFARIN SODIUM 2.5 MG/1
1 TABLET ORAL
Qty: 0 | Refills: 0 | DISCHARGE

## 2024-02-29 RX ORDER — HYDRALAZINE HCL 50 MG
100 TABLET ORAL THREE TIMES A DAY
Refills: 0 | Status: DISCONTINUED | OUTPATIENT
Start: 2024-02-29 | End: 2024-03-08

## 2024-02-29 RX ORDER — CARVEDILOL PHOSPHATE 80 MG/1
25 CAPSULE, EXTENDED RELEASE ORAL EVERY 12 HOURS
Refills: 0 | Status: DISCONTINUED | OUTPATIENT
Start: 2024-02-29 | End: 2024-03-08

## 2024-02-29 RX ORDER — ALLOPURINOL 300 MG
1 TABLET ORAL
Refills: 0 | DISCHARGE

## 2024-02-29 RX ORDER — CARVEDILOL PHOSPHATE 80 MG/1
1 CAPSULE, EXTENDED RELEASE ORAL
Refills: 0 | DISCHARGE

## 2024-02-29 RX ORDER — METOPROLOL TARTRATE 50 MG
1 TABLET ORAL
Qty: 0 | Refills: 0 | DISCHARGE

## 2024-02-29 RX ORDER — ATORVASTATIN CALCIUM 80 MG/1
1 TABLET, FILM COATED ORAL
Qty: 0 | Refills: 0 | DISCHARGE

## 2024-02-29 RX ORDER — FUROSEMIDE 40 MG
40 TABLET ORAL EVERY 12 HOURS
Refills: 0 | Status: DISCONTINUED | OUTPATIENT
Start: 2024-02-29 | End: 2024-03-02

## 2024-02-29 RX ORDER — PANTOPRAZOLE SODIUM 20 MG/1
40 TABLET, DELAYED RELEASE ORAL
Refills: 0 | Status: DISCONTINUED | OUTPATIENT
Start: 2024-02-29 | End: 2024-03-08

## 2024-02-29 RX ORDER — ATORVASTATIN CALCIUM 80 MG/1
10 TABLET, FILM COATED ORAL AT BEDTIME
Refills: 0 | Status: DISCONTINUED | OUTPATIENT
Start: 2024-02-29 | End: 2024-03-08

## 2024-02-29 RX ORDER — OMEPRAZOLE 10 MG/1
1 CAPSULE, DELAYED RELEASE ORAL
Refills: 0 | DISCHARGE

## 2024-02-29 RX ORDER — SPIRONOLACTONE 25 MG/1
1 TABLET, FILM COATED ORAL
Refills: 0 | DISCHARGE

## 2024-02-29 RX ADMIN — ATORVASTATIN CALCIUM 10 MILLIGRAM(S): 80 TABLET, FILM COATED ORAL at 21:49

## 2024-02-29 RX ADMIN — Medication 100 MILLIGRAM(S): at 21:49

## 2024-02-29 RX ADMIN — Medication 40 MILLIGRAM(S): at 18:30

## 2024-02-29 NOTE — H&P ADULT - PROBLEM/PLAN-2
Ino Mejia MD      Surgical Pathology Report    Patient Name: HOA GROSS  [facility] Rec #: [id number]  Specimen #: [id number]    Final Diagnosis:                  (PARTS A THROUGH G -[id number])    PART A:  ANTRUM:   -     CHRONIC, MODERATELY ACTIVE GASTRITIS.  -     IMMUNOHISTOCHEMICAL STAIN FOR H. PYLORI IS POSITIVE.  -     NEGATIVE FOR INTESTINAL METAPLASIA, DYSPLASIA, OR MALIGNANCY.    PART B:  GE JUNCTION:  -     GASTRIC MUCOSA WITH ACUTE AND CHRONIC INFLAMMATION.  -     SQUAMOUS MUCOSA NOT IDENTIFIED.  -     NEGATIVE FOR INTESTINAL METAPLASIA, DYSPLASIA, OR MALIGNANCY.    PART C:  PROXIMAL:  -     SQUAMOUS MUCOSA WITHOUT DIAGNOSTIC ABNORMALITY.    PART D:  TERMINAL ILEUM:  -     ILEAL MUCOSA WITHOUT DIAGNOSTIC ABNORMALITY.    PART E:  ASCENDING COLON POLYP:  -     MUCOSAL TAG.  -     DEEPER LEVELS HAVE BEEN EXAMINED.    PART F:  RANDOM COLON:  -     COLONIC MUCOSA WITHOUT DIAGNOSTIC ABNORMALITY.    PART G:  RECTAL:   -     COLONIC MUCOSA WITH CHRONIC INFLAMMATION AND FOCAL       SUPERFICIAL HYPERPLASTIC CHANGES.    TR1      [facility]/11/21/2017  ***Electronically Signed Out By Ino Taylor M.D. ***      Clinical Information:    GERD, ABDOMINAL PAIN, POST OP EGD DX; GERD, POST OP COLONOSCOPY DX;  HEMORRHOIDS, DIVERTICULOSIS, POLYPS    Specimen(s) Submitted:    A: BX. ANTRUM R/O GASTRITIS.  B: BX. GE JUNCTION R/O GIRALDO'S.  C: BX. PROXIMAL R/O EOE.  D: BX. TERMINAL ILEUM.  E: ASCENDING COLON POLYP.  F: BX. RANDOM COLON.  G: BX. RECTUM.      Narrative   [employer] - 11/16/2017 2:01 PM CST [facility]  GI Lab  _______________________________________________________________________________  Patient Name: Hoa Gross               Procedure Date: 11/16/2017 12:58 PM  MRN: [id number]                          Account Number: [id number]  YOB: 1961              Admit Type: Outpatient  Age: 56                               Room: GI Lab 3  Gender: Female                           _______________________________________________________________________________    Providers:           Geovanni Higginbotham MD (Doctor) [address] [place] [address], JESSE PEDROZA RN (Nurse),                       Marta Vargas (Technician), Akhil Trent MD                       (Anesthesia Staff)  Procedure:           Upper GI endoscopy  Pre OP Indications:  Heartburn, Follow-up of gastro-esophageal reflux disease  Referring MD:        Fay Carey  Medicines:           Monitored Anesthesia Care  Complications:       No immediate complications.  _______________________________________________________________________________  Procedure:           After obtaining informed consent, the endoscope was                       passed under direct vision. Throughout the procedure,                       the patient's blood pressure, pulse, and oxygen                       saturations were monitored continuously. The Endoscope                       was introduced through the mouth, and advanced to the                       second part of duodenum. The upper GI endoscopy was                       accomplished without difficulty. The patient tolerated                       the procedure well.                                                                                  Post OP Findings:      The Z-line was irregular and was found 38 cm from the incisors.      LA Grade B (one or more mucosal breaks greater than 5 mm, not extending       between the tops of two mucosal folds) esophagitis with no bleeding was       found 38 to 39 cm from the incisors. Biopsies were taken with a cold       forceps for histology.      The upper third of the esophagus and middle third of the esophagus were       normal. Biopsies were taken with a cold forceps for histology.      Scattered moderate inflammation characterized by congestion (edema),       erythema and granularity was found  in the gastric antrum, in the       prepyloric region of the stomach and at the pylorus. Biopsies were taken       with a cold forceps for histology.      The gastric fundus, gastric body, greater curvature of the stomach,       lesser curvature of the stomach and incisura were normal.      The duodenal bulb, first portion of the duodenum and second portion of       the duodenum were normal.                                                                                  Impression:      - Z-line irregular, 38 cm from the incisors.      - LA Grade B reflux esophagitis. Rule out Damon's esophagus. Biopsied.      - Normal upper third of esophagus and middle third of esophagus.       Biopsied.      - Chronic gastritis. Biopsied.      - Normal gastric fundus, gastric body, greater curvature of the stomach,       lesser curvature of the stomach and incisura.      - Normal duodenal bulb, first portion of the duodenum and second portion       of the duodenum.  Recommendation:      - Written discharge instructions were provided to the patient.      - Resume previous diet.      - Continue present medications.      - Await pathology results.      - Follow an antireflux regimen.                                                                                  Attending Participation:      I personally performed the entire procedure.                                                                                    _________________  Geovanni Higginbotham MD  11/16/2017 2:01:44 PM  This report has been signed electronically.  Number of Addenda: 0    Note Initiated On: 11/16/2017 12:58 PM  CC Letter to:               PROVATION - 11/16/2017 2:25 PM CST [facility]  GI Lab  _______________________________________________________________________________  Patient Name: Hoa Kemp               Procedure Date: 11/16/2017 12:54 PM  MRN: [id number]                          Account Number: [id number]  YOB: 1961               Admit Type: Outpatient  Age: 56                               Room: GI Lab 3  Gender: Female                          _______________________________________________________________________________    Providers:           Geovanni Higginbotham MD (Doctor) [address] [place] [address], JESSE PEDROZA RN (Nurse),                       Marta Vargas (Technician), Akhil Trent MD                       (Anesthesia Staff)  Procedure:           Colonoscopy  Pre OP Indications:  Screening for colorectal malignant neoplasm, Incidental                       - Lower abdominal pain, Incidental - Constipation  Referring MD:        Fay Carey  Medicines:           Monitored Anesthesia Care  Complications:       No immediate complications.  _______________________________________________________________________________  Procedure:           After I obtained informed consent, the scope was passed                       under direct vision. Throughout the procedure, the                       patient's blood pressure, pulse, and oxygen saturations                       were monitored continuously. The Colonoscope was                       introduced through the anus and advanced to the terminal                       ileum, with identification of the appendiceal orifice                       and IC valve. The colonoscopy was performed without                       difficulty. The patient tolerated the procedure well.                       The quality of the bowel preparation was good.                                                                                  Post OP Findings:      The perianal exam findings include hemorrhoids.      The terminal ileum appeared normal. Biopsies were taken with a cold       forceps for histology.      The descending colon, splenic flexure, transverse colon, hepatic       flexure, ascending colon, cecum and ileocecal valve appeared  normal.       Biopsies were taken with a cold forceps for histology.      A 3 mm polyp was found in the ascending colon. The polyp was sessile.       The polyp was removed with a cold biopsy forceps. Resection and       retrieval were complete.      Multiple small and large-mouthed diverticula were found in the sigmoid       colon. There was no evidence of diverticular bleeding.      Non-bleeding external and internal hemorrhoids were found during       retroflexion. The hemorrhoids were medium-sized.      The rectum and recto-sigmoid colon appeared normal. Biopsies were taken       with a cold forceps for histology.                                                                                  Impression:      - Hemorrhoids found on perianal exam.      - The examined portion of the ileum was normal. Biopsied.      - The descending colon, splenic flexure, transverse colon, hepatic       flexure, ascending colon, cecum and ileocecal valve are normal. Biopsied.      - One 3 mm polyp in the ascending colon, removed with a cold biopsy       forceps. Resected and retrieved.      - Diverticulosis in the sigmoid colon. There was no evidence of       diverticular bleeding.      - Non-bleeding external and internal hemorrhoids.      - The rectum and recto-sigmoid colon are normal. Biopsied.  Recommendation:      - Written discharge instructions were provided to the patient.      - Resume previous diet.      - Continue present medications.      - Await pathology results.                                                                                  Attending Participation:      I personally performed the entire procedure.                                                                                    _________________  Geovanni Higginbotham MD  11/16/2017 2:25:10 PM  This report has been signed electronically.  Number of Addenda: 0    Note Initiated On: 11/16/2017 12:54 PM  CC Letter to:               COLONOSCOPY (11/16/2017  12:54 PM CST)   Performing Organization Address City/State/ZIP Code Phone Number   [employer]                   DISPLAY PLAN FREE TEXT

## 2024-02-29 NOTE — H&P ADULT - PROBLEM SELECTOR PLAN 1
-possibly stemming from recent stoppage of metolazone and/or recent URI  -will place on 40 IV lasix bid for now; hold 20 mg torsemide and 12.5 mg spironolactone for now  -follow renal function, Is/Os  -tele, tte  -would call chf team in am

## 2024-02-29 NOTE — H&P ADULT - PROBLEM SELECTOR PLAN 4
as there are some discrepancies between med list from pcp office and what pt reporting (ie stoppage of metolazone, and usual coumadin  dosing ) will email pharmacy for med rec

## 2024-02-29 NOTE — PHARMACOTHERAPY INTERVENTION NOTE - COMMENTS
Medication history is incomplete. Unable to verify patient's medication list with two sources. Source: ABDIAZIZ Hernández    As per documents found in HIE  Patients previous dose for warfarin:  -Warfarin 6.5 mg mon, tue, wed                 7.5mg thurs, fri, sat, sun    Due to elevated INR today( 2/29/24), patient was to hold his dose 2/29/24, 3/1/24 and resume at 6.5 mg daily

## 2024-02-29 NOTE — ED ADULT NURSE NOTE - TEMPLATE LIST FOR HEAD TO TOE ASSESSMENT
In responding to this request, please exercise your independent professional judgment.  The fact that a question is asked does not imply that any particular answer is desired or expected. Respiratory

## 2024-02-29 NOTE — ED ADULT NURSE NOTE - NSFALLHARMRISKINTERV_ED_ALL_ED

## 2024-02-29 NOTE — ED ADULT TRIAGE NOTE - CHIEF COMPLAINT QUOTE
pt sent from MD for SOB x4 months, worsening over last month. pt endorsing dyspnea on exertion and while laying flat. 93% on room air, placed on 2L NC by EMS with improvement. pt tachypneic, use of accessory muscles noted. +cough. pt also endorsing bilateral leg and foot swelling. denies chest pian, n/v/d, dizziness, numbness/tingling. phx: Afib, HTN, DM.

## 2024-02-29 NOTE — ED PROVIDER NOTE - CLINICAL SUMMARY MEDICAL DECISION MAKING FREE TEXT BOX
Aamir: SOB x weeks. Diffuse expiratory wheeze, crackles. Orthopnea. Likely CHF. Check EKG, BNP, trop. Lasix. Admit.
no

## 2024-02-29 NOTE — ED PROVIDER NOTE - OBJECTIVE STATEMENT
73-year-old male with history of CHF, hypertension, A-fib on warfarin comes into the ED from doctor's office for evaluation of shortness of breath. He states that he has been having increased shortness of breath for the past few months but its gotten significantly worse over the past 2 weeks.  He always has to sit up when he sleeping but he feels like its gotten worse lately.  He does not have any chest pain or palpitations but he does feel more short of breath with exertion.  He has not noticed significant amount of lower extremity swelling but he does feel like he has had increased work of breathing and hears wheezing and crackles when he is breathing.  He denies any recent fevers chills, lightheadedness or dizziness abdominal pain lower extremity pain or swelling.

## 2024-02-29 NOTE — H&P ADULT - NSHPPHYSICALEXAM_GEN_ALL_CORE
PHYSICAL EXAM:      Constitutional: NAD, well-groomed, well-developed  HEENT:  EOMI, Normal Hearing  Neck: No LAD, No JVD  Back: Normal spine flexure, No CVA tenderness  Respiratory: diffuse expir wheezing  Cardiovascular: S1 and S2, RRR  Gastrointestinal: BS+, soft, NT/ND  Extremities: + peripheral edema; R>L  Vascular: 2+ peripheral pulses  Neurological: A/O x 3, no focal deficits  Psychiatric: Normal mood, normal affect  Musculoskeletal: 5/5 strength b/l upper and lower extremities  Skin: No rashes

## 2024-02-29 NOTE — ED ADULT TRIAGE NOTE - BP NONINVASIVE DIASTOLIC (MM HG)
[facility] Medicine Services  PROGRESS NOTE    Patient Name: Jose Hurtado  : 1949  MRN: [id number]    Date of Admission: 2023  Primary Care Physician: Brandin Bolton MD    Subjective   Subjective     CC:  Follow-up for recent fevers and nausea    Subjective:  Fever and nausea have resolved.  Patient is feeling better.  He is pleased with the care he is receiving.  He denies any new complaints.  His is frustrated with loose dentures.    Review of Systems  No current fevers or chills  No current shortness of breath or cough  No current nausea, vomiting, or diarrhea  No current chest pain or palpitations      Objective   Objective     Vital Signs:   Temp:  [97.5 °F (36.4 °C)-98.4 °F (36.9 °C)] 97.5 °F (36.4 °C)  Heart Rate:  [72-78] 76  Resp:  [16-20] 16  BP: (108-134)/(57-65) 129/63        Physical Exam:  Constitutional: Awake, alert, elderly, clinically appears improving  Eyes: PERRLA, sclerae anicteric, no conjunctival injection  HENT: NCAT, mucous membranes moist  Neck: Supple, no thyromegaly, no lymphadenopathy, trachea midline  Respiratory: No cough or wheezing, moderate inspiration, nonlabored respirations   Cardiovascular: Pulse rate is normal, palpable radial pulses bilaterally  Gastrointestinal:soft, nontender, nondistended  Musculoskeletal: Thin, BMI is 20, no bilateral ankle edema, no clubbing or cyanosis to extremities  Psychiatric: Appropriate affect, cooperative  Neurologic: Oriented, no slurred speech or facial droop, conversational, able to follow instructions, moving extremities  Skin: No rashes or jaundice       Results Reviewed:  Results from last 7 days   Lab Units 23  0632 11/15/23  0725 23  2249   WBC 10*3/mm3 5.73 7.50 5.86   HEMOGLOBIN g/dL 8.4* 8.4* 9.9*   HEMATOCRIT % 25.3* 26.1* 30.4*   PLATELETS 10*3/mm3 199 220 252     Results from last 7 days   Lab Units 23  0632 11/15/23  0725 23  2249   SODIUM mmol/L 135* 136 138   POTASSIUM mmol/L 3.9 3.8 3.6    CHLORIDE mmol/L 104 103 102   CO2 mmol/L 26.7 26.1 27.0   BUN mg/dL 21 23 29*   CREATININE mg/dL 1.15 1.18 1.46*   GLUCOSE mg/dL 124* 108* 104*   CALCIUM mg/dL 8.3* 8.3* 9.0   ALK PHOS U/L  --   --  103   ALT (SGPT) U/L  --   --  15   AST (SGOT) U/L  --   --  19     Estimated Creatinine Clearance: 60.5 mL/min (by C-G formula based on SCr of 1.15 mg/dL).    Microbiology Results Abnormal       Procedure Component Value - Date/Time    Blood Culture - Blood, Arm, Right [[id number]]  (Normal) Collected: 11/14/23 2249    Lab Status: Preliminary result Specimen: Blood from Arm, Right Updated: 11/15/23 2300     Blood Culture No growth at 24 hours    Respiratory Panel PCR w/COVID-19(SARS-CoV-2) TONIE/HOWARD/CARMEN/PAD/COR/ALEXANDER In-House, NP Swab in UTM/VTM, 2 HR TAT - Swab, Nasopharynx [[id number]]  (Normal) Collected: 11/14/23 2309    Lab Status: Final result Specimen: Swab from Nasopharynx Updated: 11/15/23 0001     ADENOVIRUS, PCR Not Detected     Coronavirus 229E Not Detected     Coronavirus HKU1 Not Detected     Coronavirus NL63 Not Detected     Coronavirus OC43 Not Detected     COVID19 Not Detected     Human Metapneumovirus Not Detected     Human Rhinovirus/Enterovirus Not Detected     Influenza A PCR Not Detected     Influenza B PCR Not Detected     Parainfluenza Virus 1 Not Detected     Parainfluenza Virus 2 Not Detected     Parainfluenza Virus 3 Not Detected     Parainfluenza Virus 4 Not Detected     RSV, PCR Not Detected     Bordetella pertussis pcr Not Detected     Bordetella parapertussis PCR Not Detected     Chlamydophila pneumoniae PCR Not Detected     Mycoplasma pneumo by PCR Not Detected    Narrative:      In the setting of a positive respiratory panel with a viral infection PLUS a negative procalcitonin without other underlying concern for bacterial infection, consider observing off antibiotics or discontinuation of antibiotics and continue supportive care. If the respiratory panel is positive for atypical bacterial  infection (Bordetella pertussis, Chlamydophila pneumoniae, or Mycoplasma pneumoniae), consider antibiotic de-escalation to target atypical bacterial infection.            Imaging Results (Last 24 Hours)       ** No results found for the last 24 hours. **            Results for orders placed during the hospital encounter of 05/12/22    Adult Transthoracic Echo Complete w/ Color, Spectral and Contrast if necessary per protocol    Interpretation Summary  · Estimated right ventricular systolic pressure from tricuspid regurgitation is mildly elevated (35-45 mmHg). Calculated right ventricular systolic pressure from tricuspid regurgitation is 43 mmHg.  · Left ventricular wall thickness is consistent with mild concentric hypertrophy.  · Estimated left ventricular EF = 56% Left ventricular systolic function is normal.  · Left ventricular diastolic function was normal.      I have reviewed the medications:  Scheduled Meds:apixaban, 5 mg, Oral, Q12H  atorvastatin, 40 mg, Oral, Nightly  ertapenem, 1,000 mg, Intravenous, Q24H  [Held by provider] furosemide, 40 mg, Oral, Daily  gabapentin, 100 mg, Oral, TID  melatonin, 1 mg, Oral, Nightly  metoclopramide, 5 mg, Oral, TID AC  pantoprazole, 40 mg, Oral, Q AM  PARoxetine, 40 mg, Oral, QAM  polyethylene glycol, 17 g, Oral, Daily  potassium chloride, 10 mEq, Oral, Daily  pramipexole, 1.5 mg, Oral, BID  senna-docusate sodium, 2 tablet, Oral, BID  sodium chloride, 10 mL, Intravenous, Q12H  tamsulosin, 0.4 mg, Oral, Daily  tiotropium bromide monohydrate, 2 puff, Inhalation, Daily      Continuous Infusions:   PRN Meds:.  acetaminophen **OR** acetaminophen **OR** acetaminophen    albuterol    senna-docusate sodium **AND** polyethylene glycol **AND** bisacodyl **AND** bisacodyl    calcium carbonate    ondansetron **OR** ondansetron    sodium chloride    sodium chloride    [COMPLETED] Insert Peripheral IV **AND** sodium chloride    sodium chloride    sodium chloride    Assessment & Plan    Assessment & Plan     Active Hospital Problems    Diagnosis  POA    **Acute UTI [N39.0]  Yes    Gram-negative bacteremia [R78.81]  Yes    Moderate malnutrition [E44.0]  Yes    ALEC (acute kidney injury) [N17.9]  Yes    Sepsis [A41.9]  Yes    Bilateral hydronephrosis [N13.30]  Yes    Scarring of lung [J98.4]  Yes    Wound of left great toe [S91.102A]  Yes    Dehydration [E86.0]  Yes    Weight loss [R63.4]  Yes    Acute urinary retention [R33.8]  Yes    Chronic obstructive pulmonary disease [J44.9]  Yes    Atrial fibrillation [I48.91]  Yes    CKD (chronic kidney disease) stage 2, GFR 60-89 ml/min [N18.2]  Yes    History of recurrent deep vein thrombosis (DVT) [Z86.718]  Not Applicable    Gastroparesis [K31.84]  Yes    B12 deficiency [E53.8]  Yes    Peripheral polyneuropathy [G62.9]  Yes    Anemia [D64.9]  Yes    Hypertension [I10]  Yes    History of esophageal cancer [Z85.01]  Yes    RLS (restless legs syndrome) [G25.81]  Yes      Resolved Hospital Problems   No resolved problems to display.        Brief Hospital Course to date:  Jose Hurtado is a 74 y.o. male multiple chronic medical issues presents the hospital with sepsis secondary to urinary tract infection with urine obstruction causing bilateral hydronephrosis and acute kidney injury.    Discussion/plan for today:  Blood cultures now positive for ESBL bacteremia.  Consult infectious disease.  Change antibiotic to ertapenem.  Etiology of bloodstream infection is felt to be urinary tract.  Control new King care.  Sepsis appears resolved now.  Discontinue IV fluid.  Renal function returned to baseline.     Sepsis due to urinary tract infection:  Initially with fevers, tachycardia, and tachypnea, improved with treatment  Improved with broad-spectrum antibiotic coverage.  Blood culture returned positive for ESBL E. coli and urine culture positive for E. coli  Completed IV fluid resuscitation.     Urine retention with bilateral hydronephrosis:  Acute on chronic  issue.  Continue Flomax.  Urology consult.  They have instructed just to insert King catheter.  King catheter ordered.  Patient will need to follow-up in urology clinic.     Acute kidney injury with CKD 2:  Previous records reviewed and baseline creatinine appears to be 1.0-1.1.  Patient was 1.46 at admission.  Improved with IV fluids.     Gastroparesis, weight loss, poor nutrition:  Encourage oral intake.  Consult nutrition for malnutrition assessment.  Continue home medications.     Anemia:  Acute on chronic issue.  Some drop could be from dilution.  Baseline hemoglobin appears to be 9.4.  No bleeding reported.  Previous studies seem to be consistent with anemia of chronic disease with probable iron deficiency and noted history of B12 deficiency.  Trend anemia and monitor for any sign of bleeding.     COPD:  Continue Spiriva.  Nebulizers as needed.     Peripheral neuropathy and restless leg: Continue home medication.  Monitor.     Paroxysmal atrial fibrillation and essential hypertension:  Continue anticoagulation  Diuretic held initially     Left great toe wound: Consult wound care to evaluate, Betadine and dressing changes, sees outpatient wound care and will continue at discharge     Treatment plan discussed with the patient who is in agreement.        DVT prophylaxis: Anticoagulated      Disposition: I expect the patient to be discharged home in 2 to 3 days    CODE STATUS:   Code Status and Medical Interventions:   Ordered at: 11/15/23 0232     Code Status (Patient has no pulse and is not breathing):    CPR (Attempt to Resuscitate)     Medical Interventions (Patient has pulse or is breathing):    Full Support       Enoc Suero MD  11/16/23     77

## 2024-02-29 NOTE — H&P ADULT - HISTORY OF PRESENT ILLNESS
73-year-old male with history of  HFpEF, CKD,  hypertension, A-fib on warfarin comes into the ED from doctor's office for evaluation of shortness of breath. He states that he has been having increased exertional shortness of breath for the past few months but its gotten significantly worse over the past 2 weeks as well as worsening orthopnea. Also with BL leg swelling. Currently on torsemide and spironolactone ; was also on metolazone but reports being told to stop metolazone (was taking twice weekly) 4 weeks ago when his weight fell below 300lb. Otherwise reports baseline salt and fluid intake; notes recent URI that began shortly after salgado/orthopnea  began worsening. Trop 38, pBNP 2938. ekg afib. CXR with increased interstitial markings. Currently reports improved resp symptoms on 2L NC (has not yet received IV lasix); not on home O2. Normal stress test march 2022    Listed h/o dm but not on dm meds

## 2024-02-29 NOTE — ED PROVIDER NOTE - ATTENDING CONTRIBUTION TO CARE
I performed a face-to-face evaluation of the patient and performed a history and physical examination. I agree with the history and physical examination. If this was a PA visit, I personally saw the patient with the PA and performed a substantive portion of the visit including all aspects of the medical decision making.    SOB x weeks. Diffuse expiratory wheeze, crackles. Orthopnea. Likely CHF. Check EKG, BNP, trop. Lasix. Admit.

## 2024-02-29 NOTE — ED ADULT NURSE REASSESSMENT NOTE - NS ED NURSE REASSESS COMMENT FT1
report given to essu 3 rn tiana, patient laying in semi fowlers position on the stretcher. patient alert and oriented times four. patient denies shortness of breath, chest pain, nausea, vomiting, chill, fever. Patient normal sinus on the monitor. Respirations equal and adequate. Patients IV patent, no signs of infiltration. Safety measures in place, call bell within reach. Patient stable upon leaving the room. FS unremarkable.

## 2024-02-29 NOTE — ED PROVIDER NOTE - PHYSICAL EXAMINATION
GENERAL: Not in acute distress, non-toxic appearing  HEAD: normocephalic, atraumatic  HEENT: EOMI, normal conjunctiva, oral mucosa moist, neck supple  CARDIAC: regular rate and rhythm, normal S1 and S2,  no appreciable murmurs  PULM: + diffuse wheezing and crackles bilat  GI: abdomen nondistended, soft, nontender, no guarding or rebound tenderness  NEURO: alert and oriented x 3, normal speech, gait normal, no gross neurologic deficit  MSK: No visible deformities, No pitting peripheral edema, no calf tenderness/redness  SKIN: No visible rashes, dry, well-perfused  PSYCH: appropriate mood and affect

## 2024-02-29 NOTE — ED ADULT NURSE REASSESSMENT NOTE - NS ED NURSE REASSESS COMMENT FT1
report received from Day MAMTA Serrano patient laying in semi fowlers position on the stretcher. patient denies shortness of breath, chest pain, nausea, vomiting, chill, fever. Patient normal sinus on the monitor. Respirations equal and adequate. Patients IV patent, no signs of infiltration. Safety measures in place, call bell within reach. Patient stable upon leaving the room.

## 2024-02-29 NOTE — H&P ADULT - NSHPREVIEWOFSYSTEMS_GEN_ALL_CORE
Review of Systems:   CONSTITUTIONAL: No fever  EYES: No eye pain, visual disturbances, or discharge  ENMT:  No difficulty hearing, tinnitus, vertigo; No sinus or throat pain  NECK: No pain or stiffness  RESPIRATORY: + improving cough  CARDIOVASCULAR: No chest pain, palpitations, dizziness; + leg swelling; worsening salgado and orthopnea  GASTROINTESTINAL: No abdominal or epigastric pain. No nausea, vomiting, or hematemesis; No diarrhea or constipation. No melena or hematochezia.  GENITOURINARY: No dysuria  NEUROLOGICAL: No headaches  MUSCULOSKELETAL: No joint pain or swelling; No muscle, back, or extremity pain

## 2024-02-29 NOTE — H&P ADULT - PROBLEM SELECTOR PLAN 6
listed h/o dm, but pt on no dm meds  will order a1c, FS for 24 hours; if necessary, place on ISS and carb diet

## 2024-02-29 NOTE — H&P ADULT - PROBLEM SELECTOR PLAN 3
-accompanying paperwork from pcp office instructs pt to hold warfarin, and to resume on Saturday at 6.5 mg given elevated INR recorded at office and unavailable to me at this time ; will recheck INR in am, and dose if appropriate

## 2024-02-29 NOTE — H&P ADULT - NSHPLABSRESULTS_GEN_ALL_CORE
11.1   5.42  )-----------( 120      ( 29 Feb 2024 14:31 )             35.3     02-29    144  |  109<H>  |  29<H>  ----------------------------<  146<H>  5.4<H>   |  26  |  1.67<H>    Ca    8.7      29 Feb 2024 14:31    TPro  7.6  /  Alb  4.1  /  TBili  0.4  /  DBili  x   /  AST  27  /  ALT  23  /  AlkPhos  110  02-29    CAPILLARY BLOOD GLUCOSE      POCT Blood Glucose.: 142 mg/dL (29 Feb 2024 17:19)  POCT Blood Glucose.: 135 mg/dL (29 Feb 2024 13:09)      Urinalysis Basic - ( 29 Feb 2024 14:31 )    Color: x / Appearance: x / SG: x / pH: x  Gluc: 146 mg/dL / Ketone: x  / Bili: x / Urobili: x   Blood: x / Protein: x / Nitrite: x   Leuk Esterase: x / RBC: x / WBC x   Sq Epi: x / Non Sq Epi: x / Bacteria: x      Vital Signs Last 24 Hrs  T(C): 36.4 (29 Feb 2024 16:18), Max: 36.7 (29 Feb 2024 13:03)  T(F): 97.5 (29 Feb 2024 16:18), Max: 98.1 (29 Feb 2024 13:03)  HR: 80 (29 Feb 2024 18:35) (65 - 80)  BP: 142/80 (29 Feb 2024 18:35) (124/77 - 142/80)  BP(mean): --  RR: 16 (29 Feb 2024 18:35) (16 - 24)  SpO2: 97% (29 Feb 2024 18:35) (97% - 99%)    Parameters below as of 29 Feb 2024 18:35  Patient On (Oxygen Delivery Method): nasal cannula  O2 Flow (L/min): 2

## 2024-02-29 NOTE — ED ADULT NURSE NOTE - OBJECTIVE STATEMENT
pt is a 73 yr old male sent in by md for increased sob/ salgado swelling in LE. pt states feeling better with 2lnc on, increased wob noted while speaking to RN. pt denies pain, fever. chills, n/v, sick contacts. #20 g piv placed in right ac, labs sent,. +LE BL swelling, +4 pitting edema. pty on CM sr noted, call bell in reach, pt oriented aaox3 and  to POC.

## 2024-03-01 DIAGNOSIS — E78.5 HYPERLIPIDEMIA, UNSPECIFIED: ICD-10-CM

## 2024-03-01 DIAGNOSIS — J10.1 INFLUENZA DUE TO OTHER IDENTIFIED INFLUENZA VIRUS WITH OTHER RESPIRATORY MANIFESTATIONS: ICD-10-CM

## 2024-03-01 DIAGNOSIS — R63.8 OTHER SYMPTOMS AND SIGNS CONCERNING FOOD AND FLUID INTAKE: ICD-10-CM

## 2024-03-01 DIAGNOSIS — I48.20 CHRONIC ATRIAL FIBRILLATION, UNSPECIFIED: ICD-10-CM

## 2024-03-01 DIAGNOSIS — I10 ESSENTIAL (PRIMARY) HYPERTENSION: ICD-10-CM

## 2024-03-01 DIAGNOSIS — E11.9 TYPE 2 DIABETES MELLITUS WITHOUT COMPLICATIONS: ICD-10-CM

## 2024-03-01 LAB
A1C WITH ESTIMATED AVERAGE GLUCOSE RESULT: 6 % — HIGH (ref 4–5.6)
ADD ON TEST-SPECIMEN IN LAB: SIGNIFICANT CHANGE UP
ADD ON TEST-SPECIMEN IN LAB: SIGNIFICANT CHANGE UP
ALBUMIN SERPL ELPH-MCNC: 4 G/DL — SIGNIFICANT CHANGE UP (ref 3.3–5)
ALP SERPL-CCNC: 112 U/L — SIGNIFICANT CHANGE UP (ref 40–120)
ALT FLD-CCNC: 23 U/L — SIGNIFICANT CHANGE UP (ref 4–41)
ANION GAP SERPL CALC-SCNC: 11 MMOL/L — SIGNIFICANT CHANGE UP (ref 7–14)
ANION GAP SERPL CALC-SCNC: 12 MMOL/L — SIGNIFICANT CHANGE UP (ref 7–14)
APTT BLD: 48.8 SEC — HIGH (ref 24.5–35.6)
AST SERPL-CCNC: 31 U/L — SIGNIFICANT CHANGE UP (ref 4–40)
B PERT DNA SPEC QL NAA+PROBE: SIGNIFICANT CHANGE UP
B PERT+PARAPERT DNA PNL SPEC NAA+PROBE: SIGNIFICANT CHANGE UP
BASE EXCESS BLDV CALC-SCNC: -0.4 MMOL/L — SIGNIFICANT CHANGE UP (ref -2–3)
BASE EXCESS BLDV CALC-SCNC: -0.8 MMOL/L — SIGNIFICANT CHANGE UP (ref -2–3)
BASOPHILS # BLD AUTO: 0.03 K/UL — SIGNIFICANT CHANGE UP (ref 0–0.2)
BASOPHILS NFR BLD AUTO: 0.5 % — SIGNIFICANT CHANGE UP (ref 0–2)
BILIRUB SERPL-MCNC: 0.5 MG/DL — SIGNIFICANT CHANGE UP (ref 0.2–1.2)
BLOOD GAS ARTERIAL COMPREHENSIVE RESULT: SIGNIFICANT CHANGE UP
BLOOD GAS VENOUS COMPREHENSIVE RESULT: SIGNIFICANT CHANGE UP
BLOOD GAS VENOUS COMPREHENSIVE RESULT: SIGNIFICANT CHANGE UP
BORDETELLA PARAPERTUSSIS (RAPRVP): SIGNIFICANT CHANGE UP
BUN SERPL-MCNC: 35 MG/DL — HIGH (ref 7–23)
BUN SERPL-MCNC: 35 MG/DL — HIGH (ref 7–23)
C PNEUM DNA SPEC QL NAA+PROBE: SIGNIFICANT CHANGE UP
CALCIUM SERPL-MCNC: 8.7 MG/DL — SIGNIFICANT CHANGE UP (ref 8.4–10.5)
CALCIUM SERPL-MCNC: 8.8 MG/DL — SIGNIFICANT CHANGE UP (ref 8.4–10.5)
CHLORIDE BLDV-SCNC: 108 MMOL/L — SIGNIFICANT CHANGE UP (ref 96–108)
CHLORIDE BLDV-SCNC: 110 MMOL/L — HIGH (ref 96–108)
CHLORIDE SERPL-SCNC: 108 MMOL/L — HIGH (ref 98–107)
CHLORIDE SERPL-SCNC: 109 MMOL/L — HIGH (ref 98–107)
CK MB BLD-MCNC: 2 % — SIGNIFICANT CHANGE UP (ref 0–2.5)
CK MB CFR SERPL CALC: 3.1 NG/ML — SIGNIFICANT CHANGE UP
CK SERPL-CCNC: 154 U/L — SIGNIFICANT CHANGE UP (ref 30–200)
CO2 BLDV-SCNC: 30.3 MMOL/L — HIGH (ref 22–26)
CO2 BLDV-SCNC: 30.7 MMOL/L — HIGH (ref 22–26)
CO2 SERPL-SCNC: 25 MMOL/L — SIGNIFICANT CHANGE UP (ref 22–31)
CO2 SERPL-SCNC: 25 MMOL/L — SIGNIFICANT CHANGE UP (ref 22–31)
CREAT SERPL-MCNC: 1.73 MG/DL — HIGH (ref 0.5–1.3)
CREAT SERPL-MCNC: 1.74 MG/DL — HIGH (ref 0.5–1.3)
EGFR: 41 ML/MIN/1.73M2 — LOW
EGFR: 41 ML/MIN/1.73M2 — LOW
EOSINOPHIL # BLD AUTO: 0.02 K/UL — SIGNIFICANT CHANGE UP (ref 0–0.5)
EOSINOPHIL NFR BLD AUTO: 0.3 % — SIGNIFICANT CHANGE UP (ref 0–6)
ESTIMATED AVERAGE GLUCOSE: 126 — SIGNIFICANT CHANGE UP
FLUAV H3 RNA SPEC QL NAA+PROBE: DETECTED
FLUBV RNA SPEC QL NAA+PROBE: SIGNIFICANT CHANGE UP
GAS PNL BLDV: 142 MMOL/L — SIGNIFICANT CHANGE UP (ref 136–145)
GAS PNL BLDV: 142 MMOL/L — SIGNIFICANT CHANGE UP (ref 136–145)
GLUCOSE BLDC GLUCOMTR-MCNC: 114 MG/DL — HIGH (ref 70–99)
GLUCOSE BLDV-MCNC: 121 MG/DL — HIGH (ref 70–99)
GLUCOSE BLDV-MCNC: 127 MG/DL — HIGH (ref 70–99)
GLUCOSE SERPL-MCNC: 128 MG/DL — HIGH (ref 70–99)
GLUCOSE SERPL-MCNC: 137 MG/DL — HIGH (ref 70–99)
HADV DNA SPEC QL NAA+PROBE: SIGNIFICANT CHANGE UP
HCO3 BLDV-SCNC: 28 MMOL/L — SIGNIFICANT CHANGE UP (ref 22–29)
HCO3 BLDV-SCNC: 29 MMOL/L — SIGNIFICANT CHANGE UP (ref 22–29)
HCOV 229E RNA SPEC QL NAA+PROBE: SIGNIFICANT CHANGE UP
HCOV HKU1 RNA SPEC QL NAA+PROBE: SIGNIFICANT CHANGE UP
HCOV NL63 RNA SPEC QL NAA+PROBE: SIGNIFICANT CHANGE UP
HCOV OC43 RNA SPEC QL NAA+PROBE: SIGNIFICANT CHANGE UP
HCT VFR BLD CALC: 36 % — LOW (ref 39–50)
HCT VFR BLDA CALC: 34 % — LOW (ref 39–51)
HCT VFR BLDA CALC: 34 % — LOW (ref 39–51)
HGB BLD CALC-MCNC: 11.3 G/DL — LOW (ref 12.6–17.4)
HGB BLD CALC-MCNC: 11.4 G/DL — LOW (ref 12.6–17.4)
HGB BLD-MCNC: 11 G/DL — LOW (ref 13–17)
HMPV RNA SPEC QL NAA+PROBE: SIGNIFICANT CHANGE UP
HPIV1 RNA SPEC QL NAA+PROBE: SIGNIFICANT CHANGE UP
HPIV2 RNA SPEC QL NAA+PROBE: SIGNIFICANT CHANGE UP
HPIV3 RNA SPEC QL NAA+PROBE: SIGNIFICANT CHANGE UP
HPIV4 RNA SPEC QL NAA+PROBE: SIGNIFICANT CHANGE UP
IANC: 4.63 K/UL — SIGNIFICANT CHANGE UP (ref 1.8–7.4)
IMM GRANULOCYTES NFR BLD AUTO: 1.6 % — HIGH (ref 0–0.9)
INR BLD: 4.54 RATIO — HIGH (ref 0.85–1.18)
LACTATE BLDV-MCNC: 0.7 MMOL/L — SIGNIFICANT CHANGE UP (ref 0.5–2)
LACTATE BLDV-MCNC: 0.9 MMOL/L — SIGNIFICANT CHANGE UP (ref 0.5–2)
LYMPHOCYTES # BLD AUTO: 0.56 K/UL — LOW (ref 1–3.3)
LYMPHOCYTES # BLD AUTO: 9.1 % — LOW (ref 13–44)
M PNEUMO DNA SPEC QL NAA+PROBE: SIGNIFICANT CHANGE UP
MAGNESIUM SERPL-MCNC: 2.3 MG/DL — SIGNIFICANT CHANGE UP (ref 1.6–2.6)
MCHC RBC-ENTMCNC: 26.1 PG — LOW (ref 27–34)
MCHC RBC-ENTMCNC: 30.6 GM/DL — LOW (ref 32–36)
MCV RBC AUTO: 85.5 FL — SIGNIFICANT CHANGE UP (ref 80–100)
MONOCYTES # BLD AUTO: 0.8 K/UL — SIGNIFICANT CHANGE UP (ref 0–0.9)
MONOCYTES NFR BLD AUTO: 13 % — SIGNIFICANT CHANGE UP (ref 2–14)
NEUTROPHILS # BLD AUTO: 4.63 K/UL — SIGNIFICANT CHANGE UP (ref 1.8–7.4)
NEUTROPHILS NFR BLD AUTO: 75.5 % — SIGNIFICANT CHANGE UP (ref 43–77)
NRBC # BLD: 0 /100 WBCS — SIGNIFICANT CHANGE UP (ref 0–0)
NRBC # FLD: 0 K/UL — SIGNIFICANT CHANGE UP (ref 0–0)
PCO2 BLDV: 69 MMHG — HIGH (ref 42–55)
PCO2 BLDV: 70 MMHG — HIGH (ref 42–55)
PH BLDV: 7.22 — LOW (ref 7.32–7.43)
PH BLDV: 7.22 — LOW (ref 7.32–7.43)
PHOSPHATE SERPL-MCNC: 4.9 MG/DL — HIGH (ref 2.5–4.5)
PLATELET # BLD AUTO: 122 K/UL — LOW (ref 150–400)
PO2 BLDV: 58 MMHG — HIGH (ref 25–45)
PO2 BLDV: 71 MMHG — HIGH (ref 25–45)
POTASSIUM BLDV-SCNC: 5.3 MMOL/L — HIGH (ref 3.5–5.1)
POTASSIUM BLDV-SCNC: 5.5 MMOL/L — HIGH (ref 3.5–5.1)
POTASSIUM SERPL-MCNC: 5.3 MMOL/L — SIGNIFICANT CHANGE UP (ref 3.5–5.3)
POTASSIUM SERPL-MCNC: 5.5 MMOL/L — HIGH (ref 3.5–5.3)
POTASSIUM SERPL-SCNC: 5.3 MMOL/L — SIGNIFICANT CHANGE UP (ref 3.5–5.3)
POTASSIUM SERPL-SCNC: 5.5 MMOL/L — HIGH (ref 3.5–5.3)
PROCALCITONIN SERPL-MCNC: 0.07 NG/ML — SIGNIFICANT CHANGE UP (ref 0.02–0.1)
PROT SERPL-MCNC: 7.7 G/DL — SIGNIFICANT CHANGE UP (ref 6–8.3)
PROTHROM AB SERPL-ACNC: 48.7 SEC — HIGH (ref 9.5–13)
RAPID RVP RESULT: DETECTED
RBC # BLD: 4.21 M/UL — SIGNIFICANT CHANGE UP (ref 4.2–5.8)
RBC # FLD: 17.9 % — HIGH (ref 10.3–14.5)
RSV RNA SPEC QL NAA+PROBE: SIGNIFICANT CHANGE UP
RV+EV RNA SPEC QL NAA+PROBE: SIGNIFICANT CHANGE UP
SAO2 % BLDV: 88 % — SIGNIFICANT CHANGE UP (ref 67–88)
SAO2 % BLDV: 93.6 % — HIGH (ref 67–88)
SARS-COV-2 RNA SPEC QL NAA+PROBE: SIGNIFICANT CHANGE UP
SODIUM SERPL-SCNC: 145 MMOL/L — SIGNIFICANT CHANGE UP (ref 135–145)
SODIUM SERPL-SCNC: 145 MMOL/L — SIGNIFICANT CHANGE UP (ref 135–145)
TROPONIN T, HIGH SENSITIVITY RESULT: 35 NG/L — SIGNIFICANT CHANGE UP
TROPONIN T, HIGH SENSITIVITY RESULT: 35 NG/L — SIGNIFICANT CHANGE UP
TSH SERPL-MCNC: 0.59 UIU/ML — SIGNIFICANT CHANGE UP (ref 0.27–4.2)
WBC # BLD: 6.14 K/UL — SIGNIFICANT CHANGE UP (ref 3.8–10.5)
WBC # FLD AUTO: 6.14 K/UL — SIGNIFICANT CHANGE UP (ref 3.8–10.5)

## 2024-03-01 PROCEDURE — 99233 SBSQ HOSP IP/OBS HIGH 50: CPT | Mod: GC

## 2024-03-01 PROCEDURE — 99233 SBSQ HOSP IP/OBS HIGH 50: CPT

## 2024-03-01 RX ORDER — LANOLIN ALCOHOL/MO/W.PET/CERES
3 CREAM (GRAM) TOPICAL AT BEDTIME
Refills: 0 | Status: DISCONTINUED | OUTPATIENT
Start: 2024-03-01 | End: 2024-03-08

## 2024-03-01 RX ORDER — INFLUENZA VIRUS VACCINE 15; 15; 15; 15 UG/.5ML; UG/.5ML; UG/.5ML; UG/.5ML
0.7 SUSPENSION INTRAMUSCULAR ONCE
Refills: 0 | Status: DISCONTINUED | OUTPATIENT
Start: 2024-03-01 | End: 2024-03-08

## 2024-03-01 RX ORDER — ACETAMINOPHEN 500 MG
650 TABLET ORAL EVERY 6 HOURS
Refills: 0 | Status: DISCONTINUED | OUTPATIENT
Start: 2024-03-01 | End: 2024-03-08

## 2024-03-01 RX ORDER — ACETAMINOPHEN 500 MG
650 TABLET ORAL EVERY 6 HOURS
Refills: 0 | Status: DISCONTINUED | OUTPATIENT
Start: 2024-03-01 | End: 2024-03-01

## 2024-03-01 RX ORDER — IPRATROPIUM/ALBUTEROL SULFATE 18-103MCG
3 AEROSOL WITH ADAPTER (GRAM) INHALATION EVERY 6 HOURS
Refills: 0 | Status: DISCONTINUED | OUTPATIENT
Start: 2024-03-01 | End: 2024-03-06

## 2024-03-01 RX ADMIN — Medication 40 MILLIGRAM(S): at 17:20

## 2024-03-01 RX ADMIN — Medication 75 MILLIGRAM(S): at 05:49

## 2024-03-01 RX ADMIN — PANTOPRAZOLE SODIUM 40 MILLIGRAM(S): 20 TABLET, DELAYED RELEASE ORAL at 05:48

## 2024-03-01 RX ADMIN — Medication 3 MILLILITER(S): at 17:20

## 2024-03-01 RX ADMIN — Medication 3 MILLILITER(S): at 09:49

## 2024-03-01 RX ADMIN — Medication 3 MILLILITER(S): at 22:53

## 2024-03-01 RX ADMIN — Medication 100 MILLIGRAM(S): at 05:48

## 2024-03-01 RX ADMIN — Medication 40 MILLIGRAM(S): at 02:43

## 2024-03-01 RX ADMIN — CARVEDILOL PHOSPHATE 25 MILLIGRAM(S): 80 CAPSULE, EXTENDED RELEASE ORAL at 05:48

## 2024-03-01 RX ADMIN — Medication 650 MILLIGRAM(S): at 06:42

## 2024-03-01 NOTE — PROVIDER CONTACT NOTE (CRITICAL VALUE NOTIFICATION) - DATE AND TIME:
Final Anesthesia Post-op Assessment    Patient: Arnoldo Swan  Procedure(s) Performed: CATARACT EXTRACTION WITH INTRAOCULAR LENS IMPLANT,RIGHT EYE - RIGHT  Anesthesia type: MAC    Vitals Value Taken Time   Temp 36.8 08/19/20 1423   Pulse 54 08/19/20 1423   Resp 16 08/19/20 1423   SpO2 97 % 08/19/20 1423   /74 08/19/20 1420   Vitals shown include unvalidated device data.      Patient Location: Phase II  Post-op Vital Signs:stable  Level of Consciousness: participates in exam, awake and alert  Respiratory Status: spontaneous ventilation  Cardiovascular stable  Hydration: euvolemic Nausea: None  Airway Patency:patent  Post-op Assessment: awake, alert, appropriately conversant, or baseline, no complications and patient tolerated procedure well with no complications      No complications documented.    01-Mar-2024 15:30 01-Mar-2024 16:04

## 2024-03-01 NOTE — PROVIDER CONTACT NOTE (MEDICATION) - ASSESSMENT
Patient is A&Ox4, Patient denies pain, chest pain, shortness of breath, nausea, vomiting or dizziness.
Patient is A&Ox4, Patient denies pain, chest pain, shortness of breath, nausea, vomiting or dizziness.

## 2024-03-01 NOTE — PATIENT PROFILE ADULT - FUNCTIONAL ASSESSMENT - BASIC MOBILITY 6.
2-calculated by average/Not able to assess (calculate score using [facility] averaging method)

## 2024-03-01 NOTE — PROVIDER CONTACT NOTE (MEDICATION) - REASON
Patient is being transitioned to non-rebreather and is due for 1800 meds
Hydralazine held because patient is on the BIPAP

## 2024-03-01 NOTE — PROVIDER CONTACT NOTE (MEDICATION) - RECOMMENDATIONS
As per provider Lashonda Weller (#[id number]) administer 1800 meds (Allopurinol 100mg, Coreg 25mg and Tamiflu) once patient gets to room on 5N.
As per provider Lashonda Weller (#[id number]) hydralazine may be held.

## 2024-03-01 NOTE — CONSULT NOTE ADULT - TIME BILLING
review of the medical record, interpretation of labs, imaging studies, discussion with interdisciplinary team, physical exam and medical decision making as above

## 2024-03-01 NOTE — PATIENT PROFILE ADULT - FALL HARM RISK - RISK INTERVENTIONS
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
## 2024-03-01 NOTE — PROVIDER CONTACT NOTE (MEDICATION) - ACTION/TREATMENT ORDERED:
As per provider Lashonda Weller (#[id number]) hydralazine may be held.
As per provider Lashonda Weller (#[id number]) administer 1800 meds (Allopurinol 100mg, Coreg 25mg and Tamiflu) once patient gets to room on 5N.

## 2024-03-01 NOTE — CHART NOTE - NSCHARTNOTEFT_GEN_A_CORE
Blood gas and BMP reviewed.   VBG noted with pH: 7.22 and pCO2: Blood gas and BMP reviewed.   VBG noted with pH: 7.22 and pCO2: 69  Patient evaluated at bedside. Patient states his SOB has improved since arriving to the hospital and has been on the oxygen. Denies any chest pain, dizziness, n/v or other complaints    General; A&ox3, Alert and awake. Appears comfortable.  talking in full sentences. No distress noted  Lungs: +Crackles diffuse  +JVD  HearT: +S1, +S2  LE: b/l edema    Acute CHF     -Will give AM dose of lasix now     -patient states he had urine output since receiving lasix earlier.      -discussed with     Low grade temp      -check RVP Blood gas and BMP reviewed.   VBG noted with pH: 7.22 and pCO2: 69  Patient evaluated at bedside. Patient states his SOB has improved since arriving to the hospital and has been on the oxygen. Denies any chest pain, dizziness, n/v or other complaints    General; A&ox3, Alert and awake. Appears comfortable.  talking in full sentences. No distress noted  Lungs: +Crackles diffuse  +JVD  HearT: +S1, +S2  LE: b/l edema    Temp: 99.4, RR: 21, HR: 79,  BP: 171/99    Acute CHF     -patient states he had urine output since receiving lasix earlier.      -discussed with hospitalist, Dr. Ross. Hold off on BiPAP now patient without respiratory distress, satting     well on 2L NC and patient is alert and awake    -Will dose AM dose of lasix now      -Repeat blood gas in 2-3 hours     -monitor output closely        +Hyperkalemia [K: 5.5]      -just received IV lasix    Low grade temp      -check RVP    ADDENDUM  RVP: positive for Flu A. Tamiflu started. repeat blood gas ordered. Per RN, patient has 800cc output s/p lasix. Monitor I&Os closely Blood gas and BMP reviewed.   VBG noted with pH: 7.22 and pCO2: 69  Patient evaluated at bedside. Patient states his SOB has improved since being on oxygen after arriving to the hospital.  Denies any chest pain, dizziness, n/v or other complaints    General; A&ox3, Alert and awake. Appears comfortable.  talking in full sentences. No distress noted  Lungs: +Crackles diffuse  +JVD  HearT: +S1, +S2  LE: b/l edema    Temp: 99.4, RR: 21, HR: 79,  BP: 171/99    Acute CHF     -patient states he had urine output since receiving lasix earlier.      -Discussed with hospitalist, Dr. Ross about blood gas results, and recommended to hold off on BiPAP    now given patient without  respiratory distress, satting well on 2L NC [no increased oxygen requirement] and patient is alert and awake     -Will dose AM dose of lasix now      -Repeat blood gas in 2-3 hours     -monitor output closely        +Hyperkalemia [K: 5.5]      -just received IV lasix    Low grade temp      -check RVP    ADDENDUM  RVP: positive for Flu A. Tamiflu started [renally dosed]. repeat blood gas ordered. Per RN, patient has 800cc output s/p lasix. Monitor I&Os closely Blood gas and BMP reviewed.   VBG noted with pH: 7.22 and pCO2: 69  Patient evaluated at bedside. Patient states his SOB has improved since being on oxygen after arriving to the hospital.  Denies any chest pain, dizziness, n/v or other complaints    General; A&ox3, Alert and awake. Appears comfortable.  talking in full sentences. No distress noted  Lungs: +Crackles diffuse  +JVD  HearT: +S1, +S2  LE: b/l edema    Temp: 99.4, RR: 21, HR: 79,  BP: 171/99    Acute CHF     -patient states he had urine output since receiving lasix earlier.      -Discussed with hospitalist, Dr. Ross about blood gas results, and recommended to hold off on BiPAP    now given patient without  respiratory distress, satting well on 2L NC [no increased oxygen requirement] and patient is alert and awake     -Will dose AM dose of lasix now      -Repeat blood gas in 2-3 hours     -monitor output closely        +Hyperkalemia [K: 5.5]      -just received IV lasix    Low grade temp      -check RVP    ADDENDUM  RVP: positive for Flu A. Tamiflu started [renally dosed]. repeat blood gas ordered. Per RN, patient has 800cc output s/p lasix. Monitor I&Os closely    ADDENDUM  Patient revaluated at bedside. Patient does not endorse any new complaints. No worsening SOB. Denies any chest pain, dizziness  General: A&Ox3. talking in full sentences.   Lungs: +Rhonchi  +JVD  Temp: 101 rectal, BP: 157/70     Respiratory acidosis   Had good urine output since the lasix  pH: 7.22, pCO2: 69.   Lea  Discussed with Dr. Padilla, [facility]. will order BIPAP and pulm consult for new bipap  RN notified to contact respiratory for bipap    Fever    most likely secondary to flu    tylenlol given    Blood cultures ordered Blood gas and BMP reviewed.   VBG noted with pH: 7.22 and pCO2: 69  Patient evaluated at bedside. Patient states his SOB has improved since being on oxygen after arriving to the hospital.  Denies any chest pain, dizziness, n/v or other complaints    General; A&ox3, Alert and awake. Appears comfortable.  talking in full sentences. No distress noted  Lungs: +Crackles diffuse  +JVD  HearT: +S1, +S2  LE: b/l edema    Temp: 99.4, RR: 21, HR: 79,  BP: 171/99    Acute CHF     -patient states he had urine output since receiving lasix earlier.      -Discussed with hospitalist, Dr. Ross about blood gas results, and recommended to hold off on BiPAP    now given patient without  respiratory distress, satting well on 2L NC [no increased oxygen requirement] and patient is alert and awake     -Will dose AM dose of lasix now      -Repeat blood gas in 2-3 hours     -monitor output closely        +Hyperkalemia [K: 5.5]      -just received IV lasix    Low grade temp      -check RVP    ADDENDUM  RVP: positive for Flu A. Tamiflu started [renally dosed]. repeat blood gas ordered. Per RN, patient has 800cc output s/p lasix. Monitor I&Os closely    ADDENDUM  Patient revaluated at bedside. Patient does not endorse any new complaints. No worsening SOB. Denies any chest pain, dizziness  General: A&Ox3. talking in full sentences.   Lungs: +Rhonchi  +JVD  Temp: 101 rectal, BP: 157/70     Respiratory acidosis   Had good urine output since the lasix  pH: 7.22, pCO2: 69.   Lea  Discussed with Dr. Padilla, [facility]. will order BIPAP and MICU consulted for new bipap  RN notified to contact respiratory for bipap    Fever    most likely secondary to flu    tylenlol given    Blood cultures ordered Blood gas and BMP reviewed.   VBG noted with pH: 7.22 and pCO2: 69  Patient evaluated at bedside. Patient states his SOB has improved since being on oxygen after arriving to the hospital.  Denies any chest pain, dizziness, n/v or other complaints    General; A&ox3, Alert and awake. Appears comfortable.  talking in full sentences. No distress noted  Lungs: +Crackles diffuse  +JVD  HearT: +S1, +S2  LE: b/l edema    Temp: 99.4, RR: 21, HR: 79,  BP: 171/99    Acute CHF     -patient states he had urine output since receiving lasix earlier.      -Discussed with hospitalist, Dr. Ross about blood gas results, and recommended to hold off on BiPAP    now given patient without  respiratory distress, satting well on 2L NC [no increased oxygen requirement] and patient is alert and awake     -Will dose AM dose of lasix now      -Repeat blood gas in 2-3 hours     -monitor output closely        +Hyperkalemia [K: 5.5]      -just received IV lasix    Low grade temp      -check RVP    ADDENDUM  RVP: positive for Flu A. Tamiflu started [renally dosed]. repeat blood gas ordered. Per RN, patient has 800cc output s/p lasix. Monitor I&Os closely    ADDENDUM  Patient revaluated at bedside. Patient does not endorse any new complaints. No worsening SOB. Denies any chest pain, dizziness  General: A&Ox3. talking in full sentences.   Lungs: +Rhonchi  +JVD  Temp: 101 rectal, BP: 157/70     Respiratory acidosis   Had good urine output since the lasix  pH: 7.22, pCO2: 69.   Lea  Discussed with Dr. Padilla, [facility]. will order BIPAP and MICU consulted for new bipap  RN notified to contact respiratory for bipap    Fever    most likely secondary to flu    tylenlol given    Blood cultures ordered    ADDENDUM  MICU consulted but recommended to consult pulm for new bipap. Pulm consulted

## 2024-03-01 NOTE — PROGRESS NOTE ADULT - SUBJECTIVE AND OBJECTIVE BOX
[facility] Department of Hospital Medicine  Stephany Dumont DO  Available on MS Teams  Pager: [phone]    Patient is a 73y old  Male who presents with a chief complaint of ADHF (01 Mar 2024 14:48)    Subjective:  Pt seen and examined at bedside in no acute distress, on BIPAP. Asking to have BIPAP removed so he can eat.     VITAL SIGNS:  T(C): 38.3 (03-01-24 @ 06:39), Max: 38.3 (03-01-24 @ 06:39)  T(F): 101 (03-01-24 @ 06:39), Max: 101 (03-01-24 @ 06:39)  HR: 62 (03-01-24 @ 14:11) (51 - 83)  BP: 128/75 (03-01-24 @ 14:11) (122/66 - 171/99)  BP(mean): --  RR: 20 (03-01-24 @ 14:11) (16 - 30)  SpO2: 100% (03-01-24 @ 14:11) (96% - 100%)  Wt(kg): --    PHYSICAL EXAM:  Constitutional: resting comfortably in bed; NAD  Head: NC/AT  Eyes: PERRL, EOMI, anicteric sclera  ENT: no nasal discharge; MMM  Neck: supple; no JVD  Respiratory: crackles B/L; no W/R/R; on BIPAP  Cardiac: +S1/S2; RRR; no M/R/G  Gastrointestinal: soft, NT/ND; no rebound or guarding; +BSx4  Extremities: WWP, no clubbing or cyanosis; no peripheral edema  Musculoskeletal: NROM x4; no joint swelling, tenderness or erythema  Vascular: 2+ radial, DP/PT pulses B/L  Dermatologic: skin warm, dry and intact; no rashes, wounds, or scars  Neurologic: AAOx3; CNII-XII grossly intact; no focal deficits  Psychiatric: affect and characteristics of appearance, verbalizations, behaviors are appropriate    MEDICATIONS  (STANDING):  albuterol/ipratropium for Nebulization 3 milliLiter(s) Nebulizer every 6 hours  allopurinol 100 milliGRAM(s) Oral daily  atorvastatin 10 milliGRAM(s) Oral at bedtime  carvedilol 25 milliGRAM(s) Oral every 12 hours  furosemide   Injectable 40 milliGRAM(s) IV Push every 12 hours  hydrALAZINE 100 milliGRAM(s) Oral three times a day  oseltamivir 30 milliGRAM(s) Oral every 12 hours  pantoprazole    Tablet 40 milliGRAM(s) Oral before breakfast    MEDICATIONS  (PRN):  acetaminophen     Tablet .. 650 milliGRAM(s) Oral every 6 hours PRN Temp greater or equal to 38C (100.4F), Mild Pain (1 - 3)  melatonin 3 milliGRAM(s) Oral at bedtime PRN Insomnia    LABS:                        11.0   6.14  )-----------( 122      ( 01 Mar 2024 06:17 )             36.0     03-01    145  |  108<H>  |  35<H>  ----------------------------<  128<H>  5.3   |  25  |  1.74<H>    Ca    8.7      01 Mar 2024 06:17  Phos  4.9     03-01  Mg     2.30     03-01    TPro  7.7  /  Alb  4.0  /  TBili  0.5  /  DBili  x   /  AST  31  /  ALT  23  /  AlkPhos  112  03-01    PT/INR - ( 01 Mar 2024 06:17 )   PT: 48.7 sec;   INR: 4.54 ratio         PTT - ( 01 Mar 2024 06:17 )  PTT:48.8 sec  Urinalysis Basic - ( 01 Mar 2024 06:17 )    Color: x / Appearance: x / SG: x / pH: x  Gluc: 128 mg/dL / Ketone: x  / Bili: x / Urobili: x   Blood: x / Protein: x / Nitrite: x   Leuk Esterase: x / RBC: x / WBC x   Sq Epi: x / Non Sq Epi: x / Bacteria: x      CAPILLARY BLOOD GLUCOSE      POCT Blood Glucose.: 114 mg/dL (01 Mar 2024 09:33)      RADIOLOGY & ADDITIONAL TESTS: Reviewed.

## 2024-03-01 NOTE — CONSULT NOTE ADULT - NS ATTEND AMEND GEN_ALL_CORE FT
Patient seen and examined. Agree with plan as detailed in PA/NP Note.     C/w lasix, titrate of Bipap as tolerated, might need higher doses  of lasix    Guanako Marte MD  Pager: [phone]

## 2024-03-01 NOTE — CONSULT NOTE ADULT - ATTENDING COMMENTS
73-year-old man with a past medical history of heart failure with preserved ejection fraction, CKD, hypertension, A-fib on warfarin who presents with dyspnea.  He was found to have influenza and likely acute decompensated heart failure.  Imaging significant for pulmonary edema.  Labs significant for acute respiratory acidosis.  His presentation consistent with acute toxic and hypercapnic respiratory failure in the setting of decompensated heart failure and flu.    -Started on BiPAP in the emergency room with minimal improvement.  Switch to AVAPS goal tidal volume 500, respiratory rate 15 30/6 FiO2 40%  -Diuresis per primary team, goal net -1 L  -Agree with Tamiflu  -Please check a.m. ABG

## 2024-03-01 NOTE — CONSULT NOTE ADULT - ASSESSMENT
73-year-old male with history of  HFpEF, CKD,  hypertension, A-fib on warfarin comes into the ED from doctor's office for evaluation of shortness of breath. CXR with cardiomegaly and elevated BNP. Flu+    # Acute hypoxemic and hypercapnic respiratory failure   # Influenza pneumonia  # Acute decompensated heart failure    - tamiflu x5 days  - c/w diuresis, strict I&O, goal net negative 1-2L/day  - c/w bipap please recheck blood gas after 4-6 hours of consistent use. anticipate short course and discontinuation after decongestion  - if able to come off bipap today please recheck blood gas in the AM 73-year-old male with history of  HFpEF, CKD,  hypertension, A-fib on warfarin comes into the ED from doctor's office for evaluation of shortness of breath. CXR with cardiomegaly and elevated BNP. Flu+    # Acute hypoxemic and hypercapnic respiratory failure   # Influenza pneumonia  # Acute decompensated heart failure    - tamiflu x5 days  - sputum cx, blood cx, MRSA/MSSA nasal PCR, urine legionella, urine strep ag  - c/w diuresis, strict I&O, goal net negative 1-2L/day  - c/w bipap please recheck blood gas after 4-6 hours of consistent use. anticipate short course and discontinuation after decongestion  - if able to come off bipap today please recheck blood gas in the AM 73-year-old male with history of  HFpEF, CKD,  hypertension, A-fib on warfarin comes into the ED from doctor's office for evaluation of shortness of breath. CXR with cardiomegaly and elevated BNP. Flu+    # Acute hypoxemic and hypercapnic respiratory failure   # Influenza pneumonia  # Acute decompensated heart failure    - tamiflu x5 days  - sputum cx, blood cx, MRSA/MSSA nasal PCR, urine legionella, urine strep ag  - c/w diuresis, strict I&O, goal net negative 1-2L/day  - please check blood gas in the AM

## 2024-03-02 LAB
ALBUMIN SERPL ELPH-MCNC: 3.6 G/DL — SIGNIFICANT CHANGE UP (ref 3.3–5)
ALP SERPL-CCNC: 94 U/L — SIGNIFICANT CHANGE UP (ref 40–120)
ALT FLD-CCNC: 21 U/L — SIGNIFICANT CHANGE UP (ref 4–41)
ANION GAP SERPL CALC-SCNC: 13 MMOL/L — SIGNIFICANT CHANGE UP (ref 7–14)
APPEARANCE UR: CLEAR — SIGNIFICANT CHANGE UP
AST SERPL-CCNC: 25 U/L — SIGNIFICANT CHANGE UP (ref 4–40)
BASE EXCESS BLDV CALC-SCNC: 0.7 MMOL/L — SIGNIFICANT CHANGE UP (ref -2–3)
BILIRUB SERPL-MCNC: 0.4 MG/DL — SIGNIFICANT CHANGE UP (ref 0.2–1.2)
BILIRUB UR-MCNC: NEGATIVE — SIGNIFICANT CHANGE UP
BUN SERPL-MCNC: 44 MG/DL — HIGH (ref 7–23)
CALCIUM SERPL-MCNC: 8.6 MG/DL — SIGNIFICANT CHANGE UP (ref 8.4–10.5)
CHLORIDE SERPL-SCNC: 110 MMOL/L — HIGH (ref 98–107)
CO2 BLDV-SCNC: 32.1 MMOL/L — HIGH (ref 22–26)
CO2 SERPL-SCNC: 25 MMOL/L — SIGNIFICANT CHANGE UP (ref 22–31)
COLOR SPEC: YELLOW — SIGNIFICANT CHANGE UP
CREAT ?TM UR-MCNC: 215 MG/DL — SIGNIFICANT CHANGE UP
CREAT SERPL-MCNC: 2.23 MG/DL — HIGH (ref 0.5–1.3)
DIFF PNL FLD: NEGATIVE — SIGNIFICANT CHANGE UP
EGFR: 30 ML/MIN/1.73M2 — LOW
GLUCOSE BLDC GLUCOMTR-MCNC: 143 MG/DL — HIGH (ref 70–99)
GLUCOSE SERPL-MCNC: 77 MG/DL — SIGNIFICANT CHANGE UP (ref 70–99)
GLUCOSE UR QL: NEGATIVE MG/DL — SIGNIFICANT CHANGE UP
HCO3 BLDV-SCNC: 30 MMOL/L — HIGH (ref 22–29)
HCT VFR BLD CALC: 35.5 % — LOW (ref 39–50)
HGB BLD-MCNC: 10.5 G/DL — LOW (ref 13–17)
KETONES UR-MCNC: NEGATIVE MG/DL — SIGNIFICANT CHANGE UP
LEUKOCYTE ESTERASE UR-ACNC: NEGATIVE — SIGNIFICANT CHANGE UP
MAGNESIUM SERPL-MCNC: 2.2 MG/DL — SIGNIFICANT CHANGE UP (ref 1.6–2.6)
MCHC RBC-ENTMCNC: 26.4 PG — LOW (ref 27–34)
MCHC RBC-ENTMCNC: 29.6 GM/DL — LOW (ref 32–36)
MCV RBC AUTO: 89.4 FL — SIGNIFICANT CHANGE UP (ref 80–100)
NITRITE UR-MCNC: NEGATIVE — SIGNIFICANT CHANGE UP
NRBC # BLD: 0 /100 WBCS — SIGNIFICANT CHANGE UP (ref 0–0)
NRBC # FLD: 0 K/UL — SIGNIFICANT CHANGE UP (ref 0–0)
OSMOLALITY UR: 380 MOSM/KG — SIGNIFICANT CHANGE UP (ref 50–1200)
PCO2 BLDV: 70 MMHG — HIGH (ref 42–55)
PH BLDV: 7.24 — LOW (ref 7.32–7.43)
PH UR: 5.5 — SIGNIFICANT CHANGE UP (ref 5–8)
PHOSPHATE SERPL-MCNC: 4.1 MG/DL — SIGNIFICANT CHANGE UP (ref 2.5–4.5)
PLATELET # BLD AUTO: 124 K/UL — LOW (ref 150–400)
PO2 BLDV: 44 MMHG — SIGNIFICANT CHANGE UP (ref 25–45)
POTASSIUM SERPL-MCNC: 5.3 MMOL/L — SIGNIFICANT CHANGE UP (ref 3.5–5.3)
POTASSIUM SERPL-SCNC: 5.3 MMOL/L — SIGNIFICANT CHANGE UP (ref 3.5–5.3)
POTASSIUM UR-SCNC: 28.8 MMOL/L — SIGNIFICANT CHANGE UP
PROT ?TM UR-MCNC: 23 MG/DL — SIGNIFICANT CHANGE UP
PROT SERPL-MCNC: 6.6 G/DL — SIGNIFICANT CHANGE UP (ref 6–8.3)
PROT UR-MCNC: SIGNIFICANT CHANGE UP MG/DL
PROT/CREAT UR-RTO: 0.1 RATIO — SIGNIFICANT CHANGE UP (ref 0–0.2)
RBC # BLD: 3.97 M/UL — LOW (ref 4.2–5.8)
RBC # FLD: 17.8 % — HIGH (ref 10.3–14.5)
SAO2 % BLDV: 71.1 % — SIGNIFICANT CHANGE UP (ref 67–88)
SODIUM SERPL-SCNC: 148 MMOL/L — HIGH (ref 135–145)
SODIUM UR-SCNC: 64 MMOL/L — SIGNIFICANT CHANGE UP
SP GR SPEC: 1.02 — SIGNIFICANT CHANGE UP (ref 1–1.03)
UROBILINOGEN FLD QL: 1 MG/DL — SIGNIFICANT CHANGE UP (ref 0.2–1)
UUN UR-MCNC: 359.4 MG/DL — SIGNIFICANT CHANGE UP
WBC # BLD: 4.04 K/UL — SIGNIFICANT CHANGE UP (ref 3.8–10.5)
WBC # FLD AUTO: 4.04 K/UL — SIGNIFICANT CHANGE UP (ref 3.8–10.5)

## 2024-03-02 PROCEDURE — 99232 SBSQ HOSP IP/OBS MODERATE 35: CPT

## 2024-03-02 PROCEDURE — 71045 X-RAY EXAM CHEST 1 VIEW: CPT | Mod: 26

## 2024-03-02 PROCEDURE — 93970 EXTREMITY STUDY: CPT | Mod: 26

## 2024-03-02 RX ORDER — ACETAMINOPHEN 500 MG
1000 TABLET ORAL ONCE
Refills: 0 | Status: COMPLETED | OUTPATIENT
Start: 2024-03-02 | End: 2024-03-02

## 2024-03-02 RX ADMIN — Medication 650 MILLIGRAM(S): at 18:10

## 2024-03-02 RX ADMIN — ATORVASTATIN CALCIUM 10 MILLIGRAM(S): 80 TABLET, FILM COATED ORAL at 21:33

## 2024-03-02 RX ADMIN — Medication 3 MILLILITER(S): at 21:20

## 2024-03-02 RX ADMIN — Medication 650 MILLIGRAM(S): at 17:10

## 2024-03-02 RX ADMIN — Medication 30 MILLIGRAM(S): at 17:12

## 2024-03-02 RX ADMIN — Medication 40 MILLIGRAM(S): at 05:16

## 2024-03-02 RX ADMIN — Medication 100 MILLIGRAM(S): at 13:46

## 2024-03-02 RX ADMIN — Medication 3 MILLILITER(S): at 04:29

## 2024-03-02 RX ADMIN — Medication 1000 MILLIGRAM(S): at 05:57

## 2024-03-02 RX ADMIN — Medication 3 MILLILITER(S): at 15:24

## 2024-03-02 RX ADMIN — Medication 400 MILLIGRAM(S): at 04:47

## 2024-03-02 RX ADMIN — Medication 3 MILLILITER(S): at 08:08

## 2024-03-02 RX ADMIN — Medication 100 MILLIGRAM(S): at 21:33

## 2024-03-02 RX ADMIN — CARVEDILOL PHOSPHATE 25 MILLIGRAM(S): 80 CAPSULE, EXTENDED RELEASE ORAL at 17:11

## 2024-03-02 NOTE — PROGRESS NOTE ADULT - SUBJECTIVE AND OBJECTIVE BOX
pt seen and examined, no complaints, ROS - .        acetaminophen     Tablet .. 650 milliGRAM(s) Oral every 6 hours PRN  albuterol/ipratropium for Nebulization 3 milliLiter(s) Nebulizer every 6 hours  allopurinol 100 milliGRAM(s) Oral daily  atorvastatin 10 milliGRAM(s) Oral at bedtime  carvedilol 25 milliGRAM(s) Oral every 12 hours  furosemide   Injectable 40 milliGRAM(s) IV Push every 12 hours  hydrALAZINE 100 milliGRAM(s) Oral three times a day  influenza  Vaccine (HIGH DOSE) 0.7 milliLiter(s) IntraMuscular once  melatonin 3 milliGRAM(s) Oral at bedtime PRN  oseltamivir 30 milliGRAM(s) Oral every 12 hours  pantoprazole    Tablet 40 milliGRAM(s) Oral before breakfast                            10.5   4.04  )-----------( 124      ( 02 Mar 2024 07:30 )             35.5       Hemoglobin: 10.5 g/dL (03-02 @ 07:30)  Hemoglobin: 11.0 g/dL (03-01 @ 06:17)  Hemoglobin: 11.1 g/dL (02-29 @ 14:31)      03-02    148<H>  |  110<H>  |  44<H>  ----------------------------<  77  5.3   |  25  |  2.23<H>    Ca    8.6      02 Mar 2024 07:30  Phos  4.1     03-02  Mg     2.20     03-02    TPro  6.6  /  Alb  3.6  /  TBili  0.4  /  DBili  x   /  AST  25  /  ALT  21  /  AlkPhos  94  03-02    Creatinine Trend: 2.23<--, 1.74<--, 1.73<--, 1.67<--    COAGS:     CARDIAC MARKERS ( 01 Mar 2024 01:23 )  x     / x     / 154 U/L / x     / 3.1 ng/mL        T(C): 37 (03-02-24 @ 09:15), Max: 38.2 (03-02-24 @ 04:15)  HR: 70 (03-02-24 @ 09:15) (51 - 74)  BP: 122/64 (03-02-24 @ 09:15) (117/60 - 132/65)  RR: 19 (03-02-24 @ 09:15) (19 - 21)  SpO2: 95% (03-02-24 @ 09:15) (89% - 100%)  Wt(kg): --    I&O's Summary    01 Mar 2024 07:01  -  02 Mar 2024 07:00  --------------------------------------------------------  IN: 0 mL / OUT: 300 mL / NET: -300 mL    02 Mar 2024 07:01  -  02 Mar 2024 11:09  --------------------------------------------------------  IN: 200 mL / OUT: 300 mL / NET: -100 mL        Gen: Appears well in NAD  HEENT:  (-)icterus (-)pallor  CV: N S1 S2 1/6 BRANDON (+)2 Pulses B/l  Resp:  Clear to ausculatation B/L, normal effort  GI: (+) BS Soft, NT, ND  Lymph:  (2+)LE Edema, (-)obvious lymphadenopathy  Skin: Warm to touch, Normal turgor  Psych: Appropriate mood and affect      TELEMETRY: 	 Afib     ECG:  	AFib    < from: Xray Chest 2 Views PA/Lat (02.29.24 @ 15:08) >    IMPRESSION:    Mildly prominent perihilar interstitial markings.    Cardiomegaly.    < end of copied text >        ASSESSMENT/PLAN: 	73-year-old male with history of  HFpEF, CKD,  hypertension, chronic A-fib on warfarin (OP Cardio Dr Johansen), who presents with 1 week of LE edema and 3 weeks of SOB. He was found with acute influenza, Acute hypoxemic and hypercapnic respiratory failure, and acute on chronic HFpEF    #SOB  --multifactorial  --on Tamiflu, on IV Lasix, and on Bipap  --Strict I/Os, uptitrate Lasix to be net negative 1.5-2L /day  --monitor renal Function  --Bipap per Pulm    #HFpEF  --cont IV diuresis  --cont Coreg and Hydralazine for HTN and afterload reduction  --check TTE    #chronic Afib  --rates well controlled on Coreg  --cont lifelong AC   --on Coumadin, Goal INR 2-3  --?able to switch to NOAC   pt seen and examined, no complaints, ROS - .        acetaminophen     Tablet .. 650 milliGRAM(s) Oral every 6 hours PRN  albuterol/ipratropium for Nebulization 3 milliLiter(s) Nebulizer every 6 hours  allopurinol 100 milliGRAM(s) Oral daily  atorvastatin 10 milliGRAM(s) Oral at bedtime  carvedilol 25 milliGRAM(s) Oral every 12 hours  furosemide   Injectable 40 milliGRAM(s) IV Push every 12 hours  hydrALAZINE 100 milliGRAM(s) Oral three times a day  influenza  Vaccine (HIGH DOSE) 0.7 milliLiter(s) IntraMuscular once  melatonin 3 milliGRAM(s) Oral at bedtime PRN  oseltamivir 30 milliGRAM(s) Oral every 12 hours  pantoprazole    Tablet 40 milliGRAM(s) Oral before breakfast                            10.5   4.04  )-----------( 124      ( 02 Mar 2024 07:30 )             35.5       Hemoglobin: 10.5 g/dL (03-02 @ 07:30)  Hemoglobin: 11.0 g/dL (03-01 @ 06:17)  Hemoglobin: 11.1 g/dL (02-29 @ 14:31)      03-02    148<H>  |  110<H>  |  44<H>  ----------------------------<  77  5.3   |  25  |  2.23<H>    Ca    8.6      02 Mar 2024 07:30  Phos  4.1     03-02  Mg     2.20     03-02    TPro  6.6  /  Alb  3.6  /  TBili  0.4  /  DBili  x   /  AST  25  /  ALT  21  /  AlkPhos  94  03-02    Creatinine Trend: 2.23<--, 1.74<--, 1.73<--, 1.67<--    COAGS:     CARDIAC MARKERS ( 01 Mar 2024 01:23 )  x     / x     / 154 U/L / x     / 3.1 ng/mL        T(C): 37 (03-02-24 @ 09:15), Max: 38.2 (03-02-24 @ 04:15)  HR: 70 (03-02-24 @ 09:15) (51 - 74)  BP: 122/64 (03-02-24 @ 09:15) (117/60 - 132/65)  RR: 19 (03-02-24 @ 09:15) (19 - 21)  SpO2: 95% (03-02-24 @ 09:15) (89% - 100%)  Wt(kg): --    I&O's Summary    01 Mar 2024 07:01  -  02 Mar 2024 07:00  --------------------------------------------------------  IN: 0 mL / OUT: 300 mL / NET: -300 mL    02 Mar 2024 07:01  -  02 Mar 2024 11:09  --------------------------------------------------------  IN: 200 mL / OUT: 300 mL / NET: -100 mL        Gen: Appears well in NAD  HEENT:  (-)icterus (-)pallor  CV: N S1 S2 1/6 BRANDON (+)2 Pulses B/l  Resp:  Clear to ausculatation B/L, normal effort  GI: (+) BS Soft, NT, ND  Lymph:  (2+)LE Edema, (-)obvious lymphadenopathy  Skin: Warm to touch, Normal turgor  Psych: Appropriate mood and affect      TELEMETRY: 	 Afib     ECG:  	AFib    < from: Xray Chest 2 Views PA/Lat (02.29.24 @ 15:08) >    IMPRESSION:    Mildly prominent perihilar interstitial markings.    Cardiomegaly.    < end of copied text >        ASSESSMENT/PLAN: 	73-year-old male with history of  HFpEF, CKD,  hypertension, chronic A-fib on warfarin (OP Cardio Dr Crowley at [facility]), who presents with 1 week of LE edema and 3 weeks of SOB. He was found with acute influenza, Acute hypoxemic and hypercapnic respiratory failure, and acute on chronic HFpEF    #SOB  --multifactorial  --on Tamiflu, on IV Lasix, and on Bipap  --Strict I/Os, uptitrate Lasix to be net negative 1.5-2L /day  --monitor renal Function  --Bipap per Pulm    #HFpEF  --cont IV diuresis  --cont Coreg and Hydralazine for HTN and afterload reduction  --check TTE    #chronic Afib  --rates well controlled on Coreg  --cont lifelong AC   --on Coumadin, Goal INR 2-3  --?able to switch to NOAC

## 2024-03-02 NOTE — DIETITIAN INITIAL EVALUATION ADULT - PROBLEM SELECTOR PLAN 2
-follow renal function, Is/Os  -if renal function worsens, will need nephrology input Offered and patient declined

## 2024-03-02 NOTE — DIETITIAN INITIAL EVALUATION ADULT - NS FNS DIET ORDER
Diet, DASH/TLC:   Sodium & Cholesterol Restricted  1500mL Fluid Restriction ([id number]) (02-29-24 @ 20:22) [Active]

## 2024-03-02 NOTE — DIETITIAN INITIAL EVALUATION ADULT - OTHER INFO
74 yo M with PMH a-fib (coumadin), HTN, HLD, T2D, CHF, gout, and CKD p/w SOB, orthopnea, and cough. Found to be in acute CHF exacerbation with RVP+ flu A. Cardiology and pulm following, on BIPAP and IV lasix. Pending TTE and LE dopplers.    Pt seen and reports 75% intake of meals with No GI distress (nausea/vomiting/diarrhea/constipation.) at present. Noted skin ecchymosis, +4 generalized edema. Labs reviewed. A1c- 6.0% (3/1/24). Rec change diet to CHO consistent, DASH/TLC, 1500 ml fluid restriction. Pt noted with fluid related wt. fluctuations. Current wt- 283.5#. Per HIE- 314# 91/4/24), 301.4# (2/29/24).

## 2024-03-02 NOTE — DIETITIAN INITIAL EVALUATION ADULT - PERTINENT LABORATORY DATA
03-02    148<H>  |  110<H>  |  44<H>  ----------------------------<  77  5.3   |  25  |  2.23<H>    Ca    8.6      02 Mar 2024 07:30  Phos  4.1     03-02  Mg     2.20     03-02    TPro  6.6  /  Alb  3.6  /  TBili  0.4  /  DBili  x   /  AST  25  /  ALT  21  /  AlkPhos  94  03-02  POCT Blood Glucose.: 143 mg/dL (03-02-24 @ 12:26)  A1C with Estimated Average Glucose Result: 6.0 % (03-01-24 @ 06:17)

## 2024-03-02 NOTE — DIETITIAN INITIAL EVALUATION ADULT - PERTINENT MEDS FT
MEDICATIONS  (STANDING):  albuterol/ipratropium for Nebulization 3 milliLiter(s) Nebulizer every 6 hours  allopurinol 100 milliGRAM(s) Oral daily  atorvastatin 10 milliGRAM(s) Oral at bedtime  carvedilol 25 milliGRAM(s) Oral every 12 hours  hydrALAZINE 100 milliGRAM(s) Oral three times a day  influenza  Vaccine (HIGH DOSE) 0.7 milliLiter(s) IntraMuscular once  oseltamivir 30 milliGRAM(s) Oral every 12 hours  pantoprazole    Tablet 40 milliGRAM(s) Oral before breakfast    MEDICATIONS  (PRN):  acetaminophen     Tablet .. 650 milliGRAM(s) Oral every 6 hours PRN Temp greater or equal to 38C (100.4F), Mild Pain (1 - 3)  melatonin 3 milliGRAM(s) Oral at bedtime PRN Insomnia

## 2024-03-02 NOTE — PROVIDER CONTACT NOTE (OTHER) - RECOMMENDATIONS
ACP Vick Byrd notified.
ACP notified.
As per provider Lashonda Weller (#[id number]) no further interventions needed at this time.

## 2024-03-02 NOTE — PROGRESS NOTE ADULT - SUBJECTIVE AND OBJECTIVE BOX
[facility] of Hospital Medicine  Stephany DO Julia  Available on MS Teams  Pager: [phone]    Patient is a 73y old  Male who presents with a chief complaint of ADHF (01 Mar 2024 14:48)    Subjective:  Pt seen and examined at bedside in no acute distress, on 2L NC. Feels significantly better than yesterday. Communicative and alert/oriented. Does not want to have more bloodwork done today. No complaints.    Vital Signs Last 24 Hrs  T(C): 36.3 (02 Mar 2024 12:15), Max: 38.2 (02 Mar 2024 04:15)  T(F): 97.3 (02 Mar 2024 12:15), Max: 100.7 (02 Mar 2024 04:15)  HR: 63 (02 Mar 2024 12:15) (51 - 74)  BP: 128/83 (02 Mar 2024 12:15) (117/60 - 132/65)  BP(mean): --  RR: 18 (02 Mar 2024 12:15) (18 - 21)  SpO2: 98% (02 Mar 2024 12:15) (89% - 100%)    Parameters below as of 02 Mar 2024 12:15  Patient On (Oxygen Delivery Method): nasal cannula  O2 Flow (L/min): 2    PHYSICAL EXAM:  Constitutional: resting comfortably in bed; NAD  Head: NC/AT  Eyes: PERRL, EOMI, anicteric sclera  ENT: no nasal discharge; MMM  Neck: supple; no JVD  Respiratory: improving crackles diffusely; no wheezing; on 2L NC without acc mm use  Cardiac: +S1/S2; RRR; no M/R/G  Gastrointestinal: soft, NT/ND; no rebound or guarding; +BSx4  Extremities: WWP, no clubbing or cyanosis; no peripheral edema  Musculoskeletal: NROM x4; no joint swelling, tenderness or erythema  Vascular: 2+ radial, DP/PT pulses B/L  Dermatologic: skin warm, dry and intact; no rashes, wounds, or scars  Neurologic: AAOx3; CNII-XII grossly intact; no focal deficits  Psychiatric: affect and characteristics of appearance, verbalizations, behaviors are appropriate    MEDICATIONS  (STANDING):  albuterol/ipratropium for Nebulization 3 milliLiter(s) Nebulizer every 6 hours  allopurinol 100 milliGRAM(s) Oral daily  atorvastatin 10 milliGRAM(s) Oral at bedtime  carvedilol 25 milliGRAM(s) Oral every 12 hours  hydrALAZINE 100 milliGRAM(s) Oral three times a day  influenza  Vaccine (HIGH DOSE) 0.7 milliLiter(s) IntraMuscular once  oseltamivir 30 milliGRAM(s) Oral every 12 hours  pantoprazole    Tablet 40 milliGRAM(s) Oral before breakfast    MEDICATIONS  (PRN):  acetaminophen     Tablet .. 650 milliGRAM(s) Oral every 6 hours PRN Temp greater or equal to 38C (100.4F), Mild Pain (1 - 3)  melatonin 3 milliGRAM(s) Oral at bedtime PRN Insomnia    LABS:                        10.5   4.04  )-----------( 124      ( 02 Mar 2024 07:30 )             35.5     03-02    148<H>  |  110<H>  |  44<H>  ----------------------------<  77  5.3   |  25  |  2.23<H>    Ca    8.6      02 Mar 2024 07:30  Phos  4.1     03-02  Mg     2.20     03-02    TPro  6.6  /  Alb  3.6  /  TBili  0.4  /  DBili  x   /  AST  25  /  ALT  21  /  AlkPhos  94  03-02    PT/INR - ( 01 Mar 2024 06:17 )   PT: 48.7 sec;   INR: 4.54 ratio         PTT - ( 01 Mar 2024 06:17 )  PTT:48.8 sec  Urinalysis Basic - ( 02 Mar 2024 10:31 )    Color: Yellow / Appearance: Clear / S.016 / pH: x  Gluc: x / Ketone: Negative mg/dL  / Bili: Negative / Urobili: 1.0 mg/dL   Blood: x / Protein: Trace mg/dL / Nitrite: Negative   Leuk Esterase: Negative / RBC: x / WBC x   Sq Epi: x / Non Sq Epi: x / Bacteria: x      CAPILLARY BLOOD GLUCOSE      POCT Blood Glucose.: 143 mg/dL (02 Mar 2024 12:26)      RADIOLOGY & ADDITIONAL TESTS: Reviewed.

## 2024-03-02 NOTE — DIETITIAN INITIAL EVALUATION ADULT - DATE OF WEIGHT PRIOR TO ADM
04-Jan-2024 50 year old female with no formal PPH, PMH HLD, p/w 4 day hx of progressive vision loss OD admitted for concern for optic neuritis currently on IV steroids, psych c/s for depression /anxiety.    Patient does not meet criterion for major depressive d/o but does have partial features over past few months, does appear to have some features of hypomania in past that may preclude usage of standing serotinergics (OK to c/w usage of TCA for migraine/headaches). Patient had done well on seroquel in past for sleep, discussed off label use and risk of weight gain if used in conjunction with steroids for long term, patient states she will be mindful of this and already has thought of exercise plan risks/benefits discussed.    As of 12/12/22: Patient at baseline mood/affect, slightly elevated production of speech, but no obvious signs that would meet full DSM-5 criteria for brad/hypomania. Patient's current high doses of steroids can induce sx of anxiety, insomnia, but patient seems to be tolerating her doses well right now. Will c/w Seroquel qHS and provide resources for outpatient Psychiatry. Recommend limiting providing Ativan as patient will not be discharged on it. Trial the patient off of the Ativan PRN.     Recs:  - BEGIN Seroquel 50mg qHS at 9pm   - TSH low, but thyroxine WNL  - PRN for severe anxiety Ativan 0.5mg BID PRN PO  - Psych to follow weekly for support  - See psych referrals as below, no psych c/i to discharge 50 year old female with no formal PPH, PMH HLD, p/w 4 day hx of progressive vision loss OD admitted for concern for optic neuritis currently on IV steroids, psych c/s for depression /anxiety.    Patient does not meet criterion for major depressive d/o but does have partial features over past few months, does appear to have some features of hypomania in past that may preclude usage of standing serotinergics (OK to c/w usage of TCA for migraine/headaches). Patient had done well on seroquel in past for sleep, discussed off label use and risk of weight gain if used in conjunction with steroids for long term, patient states she will be mindful of this and already has thought of exercise plan risks/benefits discussed.    As of 12/12/22: Patient at baseline mood/affect, slightly elevated production of speech, but no obvious signs that would meet full DSM-5 criteria for brad/hypomania. Patient's current high doses of steroids can induce sx of anxiety, insomnia, but patient seems to be tolerating her doses well right now. Will c/w Seroquel qHS and provide resources for outpatient Psychiatry. Recommend limiting providing Ativan as patient will not be discharged on it. Trial the patient off of the Ativan PRN.     Recs:  - C/W Seroquel 50mg qHS at 9pm   - PRN for severe anxiety Ativan 0.5mg BID PRN PO  - Psych to follow weekly for support while here  - See psych referrals as below, no psych c/i to discharge

## 2024-03-03 ENCOUNTER — TRANSCRIPTION ENCOUNTER (OUTPATIENT)
Age: 74
End: 2024-03-03

## 2024-03-03 LAB
ALBUMIN SERPL ELPH-MCNC: 3.3 G/DL — SIGNIFICANT CHANGE UP (ref 3.3–5)
ALP SERPL-CCNC: 90 U/L — SIGNIFICANT CHANGE UP (ref 40–120)
ALT FLD-CCNC: 20 U/L — SIGNIFICANT CHANGE UP (ref 4–41)
ANION GAP SERPL CALC-SCNC: 7 MMOL/L — SIGNIFICANT CHANGE UP (ref 7–14)
APTT BLD: 44.5 SEC — HIGH (ref 24.5–35.6)
AST SERPL-CCNC: 23 U/L — SIGNIFICANT CHANGE UP (ref 4–40)
BASE EXCESS BLDV CALC-SCNC: 0.3 MMOL/L — SIGNIFICANT CHANGE UP (ref -2–3)
BILIRUB SERPL-MCNC: 0.4 MG/DL — SIGNIFICANT CHANGE UP (ref 0.2–1.2)
BUN SERPL-MCNC: 47 MG/DL — HIGH (ref 7–23)
CA-I SERPL-SCNC: 1.19 MMOL/L — SIGNIFICANT CHANGE UP (ref 1.15–1.33)
CALCIUM SERPL-MCNC: 8.2 MG/DL — LOW (ref 8.4–10.5)
CHLORIDE BLDV-SCNC: 108 MMOL/L — SIGNIFICANT CHANGE UP (ref 96–108)
CHLORIDE SERPL-SCNC: 108 MMOL/L — HIGH (ref 98–107)
CO2 BLDV-SCNC: 30.2 MMOL/L — HIGH (ref 22–26)
CO2 SERPL-SCNC: 27 MMOL/L — SIGNIFICANT CHANGE UP (ref 22–31)
CREAT SERPL-MCNC: 2.04 MG/DL — HIGH (ref 0.5–1.3)
EGFR: 34 ML/MIN/1.73M2 — LOW
GAS PNL BLDV: 139 MMOL/L — SIGNIFICANT CHANGE UP (ref 136–145)
GAS PNL BLDV: SIGNIFICANT CHANGE UP
GLUCOSE BLDV-MCNC: 86 MG/DL — SIGNIFICANT CHANGE UP (ref 70–99)
GLUCOSE SERPL-MCNC: 80 MG/DL — SIGNIFICANT CHANGE UP (ref 70–99)
HCO3 BLDV-SCNC: 28 MMOL/L — SIGNIFICANT CHANGE UP (ref 22–29)
HCT VFR BLD CALC: 32.6 % — LOW (ref 39–50)
HCT VFR BLDA CALC: 32 % — LOW (ref 39–51)
HGB BLD CALC-MCNC: 10.5 G/DL — LOW (ref 12.6–17.4)
HGB BLD-MCNC: 10.2 G/DL — LOW (ref 13–17)
INR BLD: 3.34 RATIO — HIGH (ref 0.85–1.18)
LACTATE BLDV-MCNC: 1.1 MMOL/L — SIGNIFICANT CHANGE UP (ref 0.5–2)
MAGNESIUM SERPL-MCNC: 2.3 MG/DL — SIGNIFICANT CHANGE UP (ref 1.6–2.6)
MCHC RBC-ENTMCNC: 26.8 PG — LOW (ref 27–34)
MCHC RBC-ENTMCNC: 31.3 GM/DL — LOW (ref 32–36)
MCV RBC AUTO: 85.8 FL — SIGNIFICANT CHANGE UP (ref 80–100)
NRBC # BLD: 0 /100 WBCS — SIGNIFICANT CHANGE UP (ref 0–0)
NRBC # FLD: 0 K/UL — SIGNIFICANT CHANGE UP (ref 0–0)
PCO2 BLDV: 63 MMHG — HIGH (ref 42–55)
PH BLDV: 7.26 — LOW (ref 7.32–7.43)
PHOSPHATE SERPL-MCNC: 2.6 MG/DL — SIGNIFICANT CHANGE UP (ref 2.5–4.5)
PLATELET # BLD AUTO: 108 K/UL — LOW (ref 150–400)
PO2 BLDV: 62 MMHG — HIGH (ref 25–45)
POTASSIUM BLDV-SCNC: 5.2 MMOL/L — HIGH (ref 3.5–5.1)
POTASSIUM SERPL-MCNC: 5.1 MMOL/L — SIGNIFICANT CHANGE UP (ref 3.5–5.3)
POTASSIUM SERPL-SCNC: 5.1 MMOL/L — SIGNIFICANT CHANGE UP (ref 3.5–5.3)
PROT SERPL-MCNC: 6.5 G/DL — SIGNIFICANT CHANGE UP (ref 6–8.3)
PROTHROM AB SERPL-ACNC: 36.1 SEC — HIGH (ref 9.5–13)
RBC # BLD: 3.8 M/UL — LOW (ref 4.2–5.8)
RBC # FLD: 17.6 % — HIGH (ref 10.3–14.5)
SAO2 % BLDV: 91.5 % — HIGH (ref 67–88)
SODIUM SERPL-SCNC: 142 MMOL/L — SIGNIFICANT CHANGE UP (ref 135–145)
WBC # BLD: 4.68 K/UL — SIGNIFICANT CHANGE UP (ref 3.8–10.5)
WBC # FLD AUTO: 4.68 K/UL — SIGNIFICANT CHANGE UP (ref 3.8–10.5)

## 2024-03-03 PROCEDURE — 99232 SBSQ HOSP IP/OBS MODERATE 35: CPT

## 2024-03-03 RX ORDER — SODIUM ZIRCONIUM CYCLOSILICATE 10 G/10G
5 POWDER, FOR SUSPENSION ORAL ONCE
Refills: 0 | Status: COMPLETED | OUTPATIENT
Start: 2024-03-03 | End: 2024-03-03

## 2024-03-03 RX ADMIN — Medication 650 MILLIGRAM(S): at 13:55

## 2024-03-03 RX ADMIN — Medication 3 MILLILITER(S): at 07:47

## 2024-03-03 RX ADMIN — CARVEDILOL PHOSPHATE 25 MILLIGRAM(S): 80 CAPSULE, EXTENDED RELEASE ORAL at 17:07

## 2024-03-03 RX ADMIN — CARVEDILOL PHOSPHATE 25 MILLIGRAM(S): 80 CAPSULE, EXTENDED RELEASE ORAL at 05:44

## 2024-03-03 RX ADMIN — Medication 650 MILLIGRAM(S): at 22:09

## 2024-03-03 RX ADMIN — Medication 650 MILLIGRAM(S): at 14:55

## 2024-03-03 RX ADMIN — Medication 30 MILLIGRAM(S): at 05:44

## 2024-03-03 RX ADMIN — Medication 3 MILLILITER(S): at 20:59

## 2024-03-03 RX ADMIN — SODIUM ZIRCONIUM CYCLOSILICATE 5 GRAM(S): 10 POWDER, FOR SUSPENSION ORAL at 17:08

## 2024-03-03 RX ADMIN — Medication 100 MILLIGRAM(S): at 13:54

## 2024-03-03 RX ADMIN — Medication 3 MILLILITER(S): at 03:57

## 2024-03-03 RX ADMIN — Medication 100 MILLIGRAM(S): at 13:55

## 2024-03-03 RX ADMIN — Medication 30 MILLIGRAM(S): at 17:07

## 2024-03-03 RX ADMIN — PANTOPRAZOLE SODIUM 40 MILLIGRAM(S): 20 TABLET, DELAYED RELEASE ORAL at 05:44

## 2024-03-03 RX ADMIN — Medication 100 MILLIGRAM(S): at 05:44

## 2024-03-03 RX ADMIN — Medication 100 MILLIGRAM(S): at 22:07

## 2024-03-03 RX ADMIN — ATORVASTATIN CALCIUM 10 MILLIGRAM(S): 80 TABLET, FILM COATED ORAL at 22:07

## 2024-03-03 NOTE — PROGRESS NOTE ADULT - SUBJECTIVE AND OBJECTIVE BOX
[facility] Department of Hospital Medicine  Stephany DO Julia  Available on MS Teams  Pager: [phone]    Patient is a 73y old  Male who presents with a chief complaint of ADHF (01 Mar 2024 14:48)    Subjective:  Pt seen and examined at bedside in no acute distress, on 2L NC. Feels almost back to baseline. Pending RA sat.     Vital Signs Last 24 Hrs  T(C): 37.1 (03 Mar 2024 10:00), Max: 37.9 (02 Mar 2024 17:10)  T(F): 98.7 (03 Mar 2024 10:00), Max: 100.3 (02 Mar 2024 17:10)  HR: 74 (03 Mar 2024 10:00) (56 - 82)  BP: 122/78 (03 Mar 2024 10:00) (105/64 - 145/57)  BP(mean): --  RR: 18 (03 Mar 2024 10:00) (16 - 19)  SpO2: 96% (03 Mar 2024 10:00) (96% - 99%)    Parameters below as of 03 Mar 2024 10:00  Patient On (Oxygen Delivery Method): nasal cannula  O2 Flow (L/min): 2    PHYSICAL EXAM:  Constitutional: resting comfortably in bed; NAD  Head: NC/AT  Eyes: PERRL, EOMI, anicteric sclera  ENT: no nasal discharge; MMM  Neck: supple; no JVD  Respiratory: improving crackles diffusely; no wheezing; on 2L NC without acc mm use  Cardiac: +S1/S2; RRR; no M/R/G  Gastrointestinal: soft, NT/ND; no rebound or guarding; +BSx4  Extremities: WWP, no clubbing or cyanosis; no peripheral edema  Musculoskeletal: NROM x4; no joint swelling, tenderness or erythema  Vascular: 2+ radial, DP/PT pulses B/L  Dermatologic: skin warm, dry and intact; no rashes, wounds, or scars  Neurologic: AAOx3; CNII-XII grossly intact; no focal deficits  Psychiatric: affect and characteristics of appearance, verbalizations, behaviors are appropriate    MEDICATIONS  (STANDING):  albuterol/ipratropium for Nebulization 3 milliLiter(s) Nebulizer every 6 hours  allopurinol 100 milliGRAM(s) Oral daily  atorvastatin 10 milliGRAM(s) Oral at bedtime  carvedilol 25 milliGRAM(s) Oral every 12 hours  hydrALAZINE 100 milliGRAM(s) Oral three times a day  influenza  Vaccine (HIGH DOSE) 0.7 milliLiter(s) IntraMuscular once  oseltamivir 30 milliGRAM(s) Oral every 12 hours  pantoprazole    Tablet 40 milliGRAM(s) Oral before breakfast  sodium zirconium cyclosilicate 5 Gram(s) Oral once    MEDICATIONS  (PRN):  acetaminophen     Tablet .. 650 milliGRAM(s) Oral every 6 hours PRN Temp greater or equal to 38C (100.4F), Mild Pain (1 - 3)  melatonin 3 milliGRAM(s) Oral at bedtime PRN Insomnia    LABS:                        10.2   4.68  )-----------( 108      ( 03 Mar 2024 06:15 )             32.6     03-03    142  |  108<H>  |  47<H>  ----------------------------<  80  5.1   |  27  |  2.04<H>    Ca    8.2<L>      03 Mar 2024 06:15  Phos  2.6     03-03  Mg     2.30     03-03    TPro  6.5  /  Alb  3.3  /  TBili  0.4  /  DBili  x   /  AST  23  /  ALT  20  /  AlkPhos  90  03-03    PT/INR - ( 03 Mar 2024 06:15 )   PT: 36.1 sec;   INR: 3.34 ratio         PTT - ( 03 Mar 2024 06:15 )  PTT:44.5 sec  Urinalysis Basic - ( 03 Mar 2024 06:15 )    Color: x / Appearance: x / SG: x / pH: x  Gluc: 80 mg/dL / Ketone: x  / Bili: x / Urobili: x   Blood: x / Protein: x / Nitrite: x   Leuk Esterase: x / RBC: x / WBC x   Sq Epi: x / Non Sq Epi: x / Bacteria: x      CAPILLARY BLOOD GLUCOSE      POCT Blood Glucose.: 143 mg/dL (02 Mar 2024 12:26)      RADIOLOGY & ADDITIONAL TESTS: Reviewed.

## 2024-03-03 NOTE — DISCHARGE NOTE PROVIDER - NSDCHHNEEDSERVICE_GEN_ALL_CORE
Wound care and assessment Medication teaching and assessment/Observation and assessment/Rehabilitation services/Teaching and training/Wound care and assessment

## 2024-03-03 NOTE — PHYSICAL THERAPY INITIAL EVALUATION ADULT - PERTINENT HX OF CURRENT PROBLEM, REHAB EVAL
Patient is 73 year old male, history of A-fib (coumadin), HTN, HLD, T2D, CHF, gout, and CKD presents with shortness of breath, orthopnea, and cough. Found to be in acute CHF exacerbation with RVP+ flu A.

## 2024-03-03 NOTE — DISCHARGE NOTE PROVIDER - ATTENDING DISCHARGE PHYSICAL EXAMINATION:
Physical Exam:     General: No acute distress, well-appearing    Eyes: PERRL, EOMI     ENT: MMM, no oropharyngeal lesions or erythema appreciated     Pulm: No increased WOB. CTAB. No wheezing.     CV: RRR. S1&S2+. No M/R/G appreciated.     Abdomen: +BS. Soft, NTND. No organomegaly.     MSK: Nml ROM    Extremities: No peripheral edema or cyanosis.     Neuro: A&Ox3, no focal deficits     Skin: Warm and dry. No visible rash.

## 2024-03-03 NOTE — DISCHARGE NOTE PROVIDER - NSFOLLOWUPCLINICSTOKEN_GEN_ALL_ED_FT
10/17/2012    Urinary tract infection 06/10/Dustin Reynoso MD;  Location: Nicholas County Mariah Rivers MD;  Location: [facility] ENDOSCOPY;  Service: Gastroenterology;  Laterality: N/A;  Post: esophageal stricture    ENDOSCOPY N/A 2021    Procedure: EGD WITH BOTOX INJECTION AND DILATION (BOUGIE 60);  Surgeon: Mariah Rivers MD;  Location: [facility] ENDOSCOPY;  Service: Gastroenterology;  Laterality: N/A;  esophageal varices, acalysia    ENDOSCOPY N/A 2022    Procedure: ESOPHAGOGASTRODUODENOSCOPY with dilitation(#60 bougie);  Surgeon: Esvin Levine MD;  Location: [facility] ENDOSCOPY;  Service: Gastroenterology;  Laterality: N/A;  dysphagia      HYSTERECTOMY      INCISION AND DRAINAGE OF WOUND Left 5/3/2023    Procedure: INCISION AND DRAINAGE knee;  Surgeon: Shukri Ware MD;  Location: [facility] MAIN OR;  Service: Orthopedics;  Laterality: Left;    KNEE ARTHROPLASTY, PARTIAL REPLACEMENT Left     PLACEMENT OF WOUND VAC Left 2023    Procedure: PLACEMENT OF WOUND VAC;  Surgeon: Fletcher Vieyra MD;  Location: [facility] MAIN OR;  Service: Vascular;  Laterality: Left;    QUADRICEPS TENDON REPAIR Left 2023    Procedure: QUADRICEPS TENDON REPAIR;  Surgeon: Shukri Ware MD;  Location: [facility] MAIN OR;  Service: Orthopedics;  Laterality: Left;    WOUND DEBRIDEMENT Left 2023    Procedure: AKA DEBRIDEMENT WOUND;  Surgeon: Fletcher Vieyra MD;  Location: Caverna Memorial [id number]                                          Suzy Rudolph MD  [address] [id number] [phone]      As needed         Medication List      No changes were made to your prescriptions during this visit.            Chandu Booker MD  01/20/24 [id number]       Chandu Booker MD  01/20/24 [id number]     [id number]:2 weeks|| ||00\01||False;

## 2024-03-03 NOTE — DISCHARGE NOTE PROVIDER - NSFOLLOWUPCLINICS_GEN_ALL_ED_FT
[facility] Pulmonolgy and Sleep Medicine  Pulmonology  [address] [place] [address], [place] [address]  Phone: [phone]  Fax:   Follow Up Time: 2 weeks

## 2024-03-03 NOTE — DISCHARGE NOTE PROVIDER - NSDCACTIVITY_GEN_ALL_CORE
How Severe Is Your Skin Lesion?: moderate Has Your Skin Lesion Been Treated?: not been treated Is This A New Presentation, Or A Follow-Up?: Skin Lesion No restrictions

## 2024-03-03 NOTE — DISCHARGE NOTE PROVIDER - NSRESEARCHGRANT_MLMHIDDEN_GEN_A_CORE
yes Eat healthy foods you enjoy. Apixaban/Eliquis DOES NOT have a special diet. Limit your alcohol intake.

## 2024-03-03 NOTE — DISCHARGE NOTE PROVIDER - NSDCCPCAREPLAN_GEN_ALL_CORE_FT
PRINCIPAL DISCHARGE DIAGNOSIS  Diagnosis: Acute CHF  Assessment and Plan of Treatment: You came to the hospital with shortness of breath and edema. You were found to require oxygen and chest xray showed fluid in your lungs. You were also found to have the flu and started on an antiviral medicine, tamiflu. You received IV lasix, which pulls water off the body, and improved significantly. An ultrasound of your heart (ECHO) showed ___. Ultrasound of your legs was negative for any blood clots. You were evaluated by cardiology and should follow up with them outpatient.     PRINCIPAL DISCHARGE DIAGNOSIS  Diagnosis: Acute CHF  Assessment and Plan of Treatment: You came to the hospital with shortness of breath and edema. You were found to require oxygen and chest xray showed fluid in your lungs. You were also found to have the flu and started on an antiviral medicine, tamiflu. You received IV lasix, which pulls water off the body, and improved significantly. An ultrasound of your heart (ECHO) showed stable heart function. Ultrasound of your legs was negative for any blood clots. You were evaluated by cardiology and should follow up with them outpatient.      SECONDARY DISCHARGE DIAGNOSES  Diagnosis: Acute respiratory failure with hypoxia and hypercapnia  Assessment and Plan of Treatment: You were found to have elevated carbon dioxide levels in your blood. You were placed on a breathing machine overnight to help breathe off the extra carbon dioxide. You will need to follow up with an outpatient pulmonologist for further workup, likely a sleep study.    Diagnosis: Subtherapeutic anticoagulation  Assessment and Plan of Treatment: You INR was low prior to discharge. You were given increased doses of Coumadin for a few days. Please resumed Coumadin 7.5mg over the weekend and follow up with your PCP on Monday 3/11/24 for repeat INR.

## 2024-03-03 NOTE — PROGRESS NOTE ADULT - SUBJECTIVE AND OBJECTIVE BOX
ROS- ,  no events overnight        acetaminophen     Tablet .. 650 milliGRAM(s) Oral every 6 hours PRN  albuterol/ipratropium for Nebulization 3 milliLiter(s) Nebulizer every 6 hours  allopurinol 100 milliGRAM(s) Oral daily  atorvastatin 10 milliGRAM(s) Oral at bedtime  carvedilol 25 milliGRAM(s) Oral every 12 hours  hydrALAZINE 100 milliGRAM(s) Oral three times a day  influenza  Vaccine (HIGH DOSE) 0.7 milliLiter(s) IntraMuscular once  melatonin 3 milliGRAM(s) Oral at bedtime PRN  oseltamivir 30 milliGRAM(s) Oral every 12 hours  pantoprazole    Tablet 40 milliGRAM(s) Oral before breakfast  sodium zirconium cyclosilicate 5 Gram(s) Oral once                            10.2   4.68  )-----------( 108      ( 03 Mar 2024 06:15 )             32.6       Hemoglobin: 10.2 g/dL (03-03 @ 06:15)  Hemoglobin: 10.5 g/dL (03-02 @ 07:30)  Hemoglobin: 11.0 g/dL (03-01 @ 06:17)  Hemoglobin: 11.1 g/dL (02-29 @ 14:31)      03-03    142  |  108<H>  |  47<H>  ----------------------------<  80  5.1   |  27  |  2.04<H>    Ca    8.2<L>      03 Mar 2024 06:15  Phos  2.6     03-03  Mg     2.30     03-03    TPro  6.5  /  Alb  3.3  /  TBili  0.4  /  DBili  x   /  AST  23  /  ALT  20  /  AlkPhos  90  03-03    Creatinine Trend: 2.04<--, 2.23<--, 1.74<--, 1.73<--, 1.67<--    COAGS: PT/INR - ( 03 Mar 2024 06:15 )   PT: 36.1 sec;   INR: 3.34 ratio         PTT - ( 03 Mar 2024 06:15 )  PTT:44.5 sec    CARDIAC MARKERS ( 01 Mar 2024 01:23 )  x     / x     / 154 U/L / x     / 3.1 ng/mL        T(C): 37.1 (03-03-24 @ 10:00), Max: 37.9 (03-02-24 @ 17:10)  HR: 74 (03-03-24 @ 10:00) (56 - 82)  BP: 122/78 (03-03-24 @ 10:00) (105/64 - 145/57)  RR: 18 (03-03-24 @ 10:00) (16 - 19)  SpO2: 96% (03-03-24 @ 10:00) (96% - 99%)  Wt(kg): --    I&O's Summary    02 Mar 2024 07:01  -  03 Mar 2024 07:00  --------------------------------------------------------  IN: 930 mL / OUT: 1200 mL / NET: -270 mL    03 Mar 2024 07:01  -  03 Mar 2024 10:49  --------------------------------------------------------  IN: 120 mL / OUT: 200 mL / NET: -80 mL            Gen: Appears well in NAD  HEENT:  (-)icterus (-)pallor  CV: N S1 S2 1/6 BRANDON (+)2 Pulses B/l  Resp:  Clear to ausculatation B/L, normal effort  GI: (+) BS Soft, NT, ND  Lymph:  (2+)LE Edema, (-)obvious lymphadenopathy  Skin: Warm to touch, Normal turgor  Psych: Appropriate mood and affect      TELEMETRY: 	 Afib     ECG:  	AFib    < from: Xray Chest 2 Views PA/Lat (02.29.24 @ 15:08) >    IMPRESSION:    Mildly prominent perihilar interstitial markings.    Cardiomegaly.    < end of copied text >        ASSESSMENT/PLAN: 	73-year-old male with history of  HFpEF, CKD,  hypertension, chronic A-fib on warfarin (OP Cardio Dr Crowley at [facility]), who presents with 1 week of LE edema and 3 weeks of SOB. He was found with acute influenza, Acute hypoxemic and hypercapnic respiratory failure, and acute on chronic HFpEF    #SOB  --multifactorial  --on Tamiflu, on IV Lasix, and on Bipap  --Strict I/Os, uptitrate Lasix to be net negative 1.5-2L /day  --monitor renal Function  --Bipap per Pulm    #HFpEF  --cont IV diuresis  --cont Coreg and Hydralazine for HTN and afterload reduction  --check TTE    #chronic Afib  --rates well controlled on Coreg  --cont lifelong AC   --on Coumadin, Goal INR 2-3  --?able to switch to NOAC

## 2024-03-03 NOTE — DISCHARGE NOTE PROVIDER - NSDCMRMEDTOKEN_GEN_ALL_CORE_FT
allopurinol 100 mg oral tablet: 1 tab(s) orally once a day  amLODIPine 10 mg oral tablet: 1 tab(s) orally once a day  atorvastatin 10 mg oral tablet: 1 tab(s) orally once a day  carvedilol 25 mg oral tablet: 1 tab(s) orally 2 times a day  hydrALAZINE 100 mg oral tablet: 1 tab(s) orally 3 times a day  omeprazole 40 mg oral delayed release capsule: 1 cap(s) orally once a day  spironolactone 25 mg oral tablet: 1 tab(s) orally once a day  torsemide 20 mg oral tablet: 1 tab(s) orally once a day  warfarin 2.5 mg oral tablet: 1 tab(s) orally once a day ...(total dose 6.5mg)  warfarin 4 mg oral tablet: 1 tab(s) orally once a day ..(total dose 6.5mg)   allopurinol 100 mg oral tablet: 1 tab(s) orally once a day  amLODIPine 10 mg oral tablet: 1 tab(s) orally once a day  atorvastatin 10 mg oral tablet: 1 tab(s) orally once a day  carvedilol 25 mg oral tablet: 1 tab(s) orally 2 times a day  hydrALAZINE 100 mg oral tablet: 1 tab(s) orally 3 times a day  omeprazole 40 mg oral delayed release capsule: 1 cap(s) orally once a day  spironolactone 25 mg oral tablet: 1 tab(s) orally once a day  torsemide 20 mg oral tablet: 1 tab(s) orally once a day  warfarin 2.5 mg oral tablet: 1 tab(s) orally once a day ...(total dose 6.5mg)  warfarin 4 mg oral tablet: 1 tab(s) orally once a day ..(total dose 6.5mg)  Xarelto 20 mg oral tablet: 1 tab(s) orally once a day with dinner TEST SCRIPT   allopurinol 100 mg oral tablet: 1 tab(s) orally once a day  amLODIPine 10 mg oral tablet: 1 tab(s) orally once a day  atorvastatin 10 mg oral tablet: 1 tab(s) orally once a day  carvedilol 25 mg oral tablet: 1 tab(s) orally 2 times a day  hydrALAZINE 100 mg oral tablet: 1 tab(s) orally 3 times a day  omeprazole 40 mg oral delayed release capsule: 1 cap(s) orally once a day  spironolactone 25 mg oral tablet: 1 tab(s) orally once a day  torsemide 20 mg oral tablet: 1 tab(s) orally once a day  warfarin 2.5 mg oral tablet: 1 tab(s) orally once a day Coumadin 6.5mg daily except on T/F/S/S 7.5mg  warfarin 4 mg oral tablet: 1 tab(s) orally once a day Coumadin 6.5mg daily except on T/F/S/S 7.5mg

## 2024-03-03 NOTE — DISCHARGE NOTE PROVIDER - HOSPITAL COURSE
Pt is a 74 yo M with PMH a-fib (coumadin), HTN, HLD, T2D, CHF, gout, and CKD p/w SOB, orthopnea, and cough. Found to be in acute CHF exacerbation with RVP+ flu A. Cardiology and pulm following, on BIPAP and s/p IV lasix with repeat CXR improved. Pending TTE; LE dopplers neg. Pending O2 weaning, on 2L NC.       Problem/Plan - 1:  ·  Problem: Acute decompensated heart failure.   ·  Plan: - pt p/w SOB, SAENZ, orthopnea, cough   - hx CHF, pending med rec but reports recently stopping metolazone   - likely provoked in setting of flu, as below  - BNP 2938  - CXR with pulmonary vascular congestion  - receiving lasix 40mg IV BID --> hold for now in setting of Cr below  - cards consulted, f/u recs  - check TTE  - monitor I&O and daily weights  - monitor on tele.     Problem/Plan - 2:  ·  Problem: Leg swelling.   ·  Plan: - pt p/w inc BL LE edema, likely in setting of CHF exacerbation, as above  - LE dopplers neg  - coumadin as below.     Problem/Plan - 3:  ·  Problem: Influenza A.   ·  Plan: - on arrival, meeting SIRS criteria with likely pulmonary etiology  - RVP+ flu A  - started on tamiflu  - supportive care   - CXR neg consolidation.     Problem/Plan - 4:  ·  Problem: Chronic kidney disease, unspecified CKD stage.   ·  Plan: - Cr 1.67 -- 1.74 -- 2.23  - FeUrea today 8.5% c/w pre-renal disease  - hold lasix for now  - CXR improved  - continue to trend  - avoid nephrotoxic agents and renally dose medications.     Problem/Plan - 5:  ·  Problem: Chronic atrial fibrillation.   ·  Plan: - home med: coumadin 6.5mg M/T/W, 7.5mg R/F/S/S  - INR 4.54 3/1 --> 3.34 today  - hold coumadin until INR <3.     Problem/Plan - 6:  ·  Problem: HTN (hypertension).   ·  Plan: - pending med rec.     Problem/Plan - 7:  ·  Problem: HLD (hyperlipidemia).   ·  Plan: - lipid profile pending.     Problem/Plan - 8:  ·  Problem: Type 2 diabetes mellitus.   ·  Plan: - A1C 6  - mISS  - carb consistent diet. 73M with PMH of non-valvular Afib on coumadin (due to cost), HTN, HLD, T2D, CHF, gout, and CKD p/w SOB, orthopnea, and cough. Found to be in acute CHF exacerbation with RVP+ flu A. Also with acute hypercapnic respiratory failure. Cardiology and pulm following, patient diuresed, treated with tamiflu, and placed on nightly AVAPs with clinical improvement. Patient trialed off AVAPs without issue, c/f possible component of MAREN. Will need outpatient sleep study.     Patient hemodynamically stable for discharge to home with home PT and close follow up with PCP, Cardiology, and Pulm.

## 2024-03-03 NOTE — DISCHARGE NOTE PROVIDER - CARE PROVIDER_API CALL
Valdo Crowley  Cardiovascular Disease  [address] [place] [address]  Phone: [phone]  Fax: [phone]  Established Patient  Follow Up Time: 2 weeks

## 2024-03-03 NOTE — DISCHARGE NOTE PROVIDER - NSDCCPTREATMENT_GEN_ALL_CORE_FT
PRINCIPAL PROCEDURE  Procedure: Transthoracic echocardiography (TTE)  Findings and Treatment:   CONCLUSIONS:      1. Technically difficult image quality.   2. The leftventricular cavity is normal in size. Left ventricular wall thickness is mildly increased. Left ventricular systolic function is normal with an ejection fraction visually estimated at 60 to 65%. There are no regional wall motion abnormalities seen. Mild left ventricular hypertrophy. There is increased LV mass and concentric hypertrophy.   3. The right ventricle is not well visualized.   4. Structurally normal mitral valve with normal leaflet excursion. There is calcification of the mitral valve annulus. There is mild mitral regurgitation.

## 2024-03-03 NOTE — DISCHARGE NOTE PROVIDER - NSDCFUADDAPPT_GEN_ALL_CORE_FT
Please follow up with your PCP by Monday 3/11 for repeat bloodwork.  -Please follow up with your PCP by Monday 3/11 for repeat bloodwork.   -Pulmonology will contact you to set up a tele medicine appointment.

## 2024-03-04 ENCOUNTER — RESULT REVIEW (OUTPATIENT)
Age: 74
End: 2024-03-04

## 2024-03-04 LAB
ALBUMIN SERPL ELPH-MCNC: 3.3 G/DL — SIGNIFICANT CHANGE UP (ref 3.3–5)
ALP SERPL-CCNC: 86 U/L — SIGNIFICANT CHANGE UP (ref 40–120)
ALT FLD-CCNC: 17 U/L — SIGNIFICANT CHANGE UP (ref 4–41)
ANION GAP SERPL CALC-SCNC: 10 MMOL/L — SIGNIFICANT CHANGE UP (ref 7–14)
APTT BLD: 36.4 SEC — HIGH (ref 24.5–35.6)
AST SERPL-CCNC: 20 U/L — SIGNIFICANT CHANGE UP (ref 4–40)
BILIRUB SERPL-MCNC: 0.5 MG/DL — SIGNIFICANT CHANGE UP (ref 0.2–1.2)
BLOOD GAS VENOUS COMPREHENSIVE RESULT: SIGNIFICANT CHANGE UP
BUN SERPL-MCNC: 48 MG/DL — HIGH (ref 7–23)
CALCIUM SERPL-MCNC: 8.1 MG/DL — LOW (ref 8.4–10.5)
CHLORIDE SERPL-SCNC: 107 MMOL/L — SIGNIFICANT CHANGE UP (ref 98–107)
CO2 SERPL-SCNC: 27 MMOL/L — SIGNIFICANT CHANGE UP (ref 22–31)
CREAT SERPL-MCNC: 1.94 MG/DL — HIGH (ref 0.5–1.3)
EGFR: 36 ML/MIN/1.73M2 — LOW
GLUCOSE SERPL-MCNC: 83 MG/DL — SIGNIFICANT CHANGE UP (ref 70–99)
HCT VFR BLD CALC: 32.3 % — LOW (ref 39–50)
HGB BLD-MCNC: 9.8 G/DL — LOW (ref 13–17)
INR BLD: 2.18 RATIO — HIGH (ref 0.85–1.18)
MAGNESIUM SERPL-MCNC: 2.2 MG/DL — SIGNIFICANT CHANGE UP (ref 1.6–2.6)
MCHC RBC-ENTMCNC: 26 PG — LOW (ref 27–34)
MCHC RBC-ENTMCNC: 30.3 GM/DL — LOW (ref 32–36)
MCV RBC AUTO: 85.7 FL — SIGNIFICANT CHANGE UP (ref 80–100)
NRBC # BLD: 0 /100 WBCS — SIGNIFICANT CHANGE UP (ref 0–0)
NRBC # FLD: 0 K/UL — SIGNIFICANT CHANGE UP (ref 0–0)
PHOSPHATE SERPL-MCNC: 2.8 MG/DL — SIGNIFICANT CHANGE UP (ref 2.5–4.5)
PLATELET # BLD AUTO: 109 K/UL — LOW (ref 150–400)
POTASSIUM SERPL-MCNC: 4.7 MMOL/L — SIGNIFICANT CHANGE UP (ref 3.5–5.3)
POTASSIUM SERPL-SCNC: 4.7 MMOL/L — SIGNIFICANT CHANGE UP (ref 3.5–5.3)
PROT SERPL-MCNC: 6.3 G/DL — SIGNIFICANT CHANGE UP (ref 6–8.3)
PROTHROM AB SERPL-ACNC: 24.1 SEC — HIGH (ref 9.5–13)
RBC # BLD: 3.77 M/UL — LOW (ref 4.2–5.8)
RBC # FLD: 17.5 % — HIGH (ref 10.3–14.5)
SODIUM SERPL-SCNC: 144 MMOL/L — SIGNIFICANT CHANGE UP (ref 135–145)
WBC # BLD: 3.89 K/UL — SIGNIFICANT CHANGE UP (ref 3.8–10.5)
WBC # FLD AUTO: 3.89 K/UL — SIGNIFICANT CHANGE UP (ref 3.8–10.5)

## 2024-03-04 PROCEDURE — 93306 TTE W/DOPPLER COMPLETE: CPT | Mod: 26

## 2024-03-04 PROCEDURE — 99233 SBSQ HOSP IP/OBS HIGH 50: CPT

## 2024-03-04 PROCEDURE — 99233 SBSQ HOSP IP/OBS HIGH 50: CPT | Mod: GC

## 2024-03-04 RX ORDER — FUROSEMIDE 40 MG
40 TABLET ORAL ONCE
Refills: 0 | Status: COMPLETED | OUTPATIENT
Start: 2024-03-04 | End: 2024-03-04

## 2024-03-04 RX ORDER — WARFARIN SODIUM 2.5 MG/1
6.5 TABLET ORAL ONCE
Refills: 0 | Status: COMPLETED | OUTPATIENT
Start: 2024-03-04 | End: 2024-03-04

## 2024-03-04 RX ADMIN — Medication 100 MILLIGRAM(S): at 06:12

## 2024-03-04 RX ADMIN — Medication 100 MILLIGRAM(S): at 13:16

## 2024-03-04 RX ADMIN — CARVEDILOL PHOSPHATE 25 MILLIGRAM(S): 80 CAPSULE, EXTENDED RELEASE ORAL at 18:18

## 2024-03-04 RX ADMIN — Medication 100 MILLIGRAM(S): at 22:31

## 2024-03-04 RX ADMIN — Medication 40 MILLIGRAM(S): at 15:46

## 2024-03-04 RX ADMIN — PANTOPRAZOLE SODIUM 40 MILLIGRAM(S): 20 TABLET, DELAYED RELEASE ORAL at 06:12

## 2024-03-04 RX ADMIN — Medication 3 MILLILITER(S): at 03:37

## 2024-03-04 RX ADMIN — Medication 3 MILLILITER(S): at 16:11

## 2024-03-04 RX ADMIN — Medication 30 MILLIGRAM(S): at 06:11

## 2024-03-04 RX ADMIN — Medication 3 MILLILITER(S): at 20:55

## 2024-03-04 RX ADMIN — WARFARIN SODIUM 6.5 MILLIGRAM(S): 2.5 TABLET ORAL at 22:38

## 2024-03-04 RX ADMIN — ATORVASTATIN CALCIUM 10 MILLIGRAM(S): 80 TABLET, FILM COATED ORAL at 22:31

## 2024-03-04 RX ADMIN — Medication 30 MILLIGRAM(S): at 18:18

## 2024-03-04 NOTE — PROGRESS NOTE ADULT - ATTENDING COMMENTS
Patient is a 74 yo M w/ HFpEF, CKD, HTN, Afib on warfarin who is admitted with acute hypercapnic respiratory failure in the setting of Flu AH1 and ADHF     #Acute hypercapnic respiratory failure  #Flu  #ADHF  - Complete course of Tamiflu  - c/w AVAPS , IPAP 10-25, EPAP 8, 40% PRN and QHS  - Please obtain AM ABG  - Recommend diuresis  - Also may have underlying MAREN, would benefit from outpatient PSG    Alejandro Lopez MD  Pulmonary & Critical Care

## 2024-03-04 NOTE — PROGRESS NOTE ADULT - SUBJECTIVE AND OBJECTIVE BOX
Date of service 3/4/24    extended hpi: 73-year-old male with history of  HFpEF, CKD,  hypertension, chronic A-fib on warfarin (OP Cardio Dr Johansen), who presents with 1 week of LE edema and 3 weeks of SOB. He was found with acute influenza, Acute hypoxemic and hypercapnic respiratory failure, and acute on chronic HFpEF    SOb improving, no chest pain    Review of Systems:   Constitutional: [ ] fevers, [ ] chills.   Skin: [ ] dry skin. [ ] rashes.  Psychiatric: [ ] depression, [ ] anxiety.   Gastrointestinal: [ ] BRBPR, [ ] melena.   Neurological: [ ] confusion. [ ] seizures. [ ] shuffling gait.   Ears,Nose,Mouth and Throat: [ ] ear pain [ ] sore throat.   Eyes: [ ] diplopia.   Respiratory: [ ] hemoptysis. [ ] shortness of breath  Cardiovascular: See HPI above  Hematologic/Lymphatic: [ ] anemia. [ ] painful nodes. [ ] prolonged bleeding.   Genitourinary: [ ] hematuria. [ ] flank pain.   Endocrine: [ ] significant change in weight. [ ] intolerance to heat and cold.     Review of systems [x ] otherwise negative, [ ] otherwise unable to obtain    FH: no family history of sudden cardiac death in first degree relatives    SH: [ ] tobacco, [ ] alcohol, [ ] drugs    acetaminophen     Tablet .. 650 milliGRAM(s) Oral every 6 hours PRN  albuterol/ipratropium for Nebulization 3 milliLiter(s) Nebulizer every 6 hours  allopurinol 100 milliGRAM(s) Oral daily  atorvastatin 10 milliGRAM(s) Oral at bedtime  carvedilol 25 milliGRAM(s) Oral every 12 hours  hydrALAZINE 100 milliGRAM(s) Oral three times a day  influenza  Vaccine (HIGH DOSE) 0.7 milliLiter(s) IntraMuscular once  melatonin 3 milliGRAM(s) Oral at bedtime PRN  oseltamivir 30 milliGRAM(s) Oral every 12 hours  pantoprazole    Tablet 40 milliGRAM(s) Oral before breakfast  warfarin 6.5 milliGRAM(s) Oral once                            9.8    3.89  )-----------( 109      ( 04 Mar 2024 05:29 )             32.3       03-04    144  |  107  |  48<H>  ----------------------------<  83  4.7   |  27  |  1.94<H>    Ca    8.1<L>      04 Mar 2024 05:29  Phos  2.8     03-04  Mg     2.20     03-04    TPro  6.3  /  Alb  3.3  /  TBili  0.5  /  DBili  x   /  AST  20  /  ALT  17  /  AlkPhos  86  03-04        T(C): 36.9 (03-04-24 @ 06:10), Max: 38 (03-03-24 @ 13:55)  HR: 53 (03-04-24 @ 06:10) (53 - 86)  BP: 124/63 (03-04-24 @ 06:10) (117/72 - 124/63)  RR: 17 (03-04-24 @ 06:10) (16 - 19)  SpO2: 100% (03-04-24 @ 06:10) (95% - 100%)  Wt(kg): --    I&O's Summary    03 Mar 2024 07:01  -  04 Mar 2024 07:00  --------------------------------------------------------  IN: 1050 mL / OUT: 1150 mL / NET: -100 mL      Gen: Appears well in NAD  HEENT:  (-)icterus (-)pallor  CV: N S1 S2 1/6 BRANDON (+)2 Pulses B/l  Resp:  Clear to ausculatation B/L, normal effort  GI: (+) BS Soft, NT, ND  Lymph:  (2+)LE Edema, (-)obvious lymphadenopathy  Skin: Warm to touch, Normal turgor  Psych: Appropriate mood and affect      TELEMETRY: 	 Afib     ECG:  	AFib    < from: Xray Chest 2 Views PA/Lat (02.29.24 @ 15:08) >    IMPRESSION:    Mildly prominent perihilar interstitial markings.    Cardiomegaly.    < end of copied text >    < from: TTE with Doppler (w/Cont) (03.17.22 @ 11:13) >  CONCLUSIONS:  1. Mitral annular calcification, otherwise normal mitral  valve. Mild mitral regurgitation.  2. Severely dilated left atrium.  LA volume index = 69  cc/m2.  3. Mild concentric left ventricular hypertrophy.  4. Endocardium not well visualized; grossly normal left  ventricular systolic function.  Endocardial visualization  enhanced with intravenous injection of echo contrast  (Definity).  5. Severe right atrial enlargement.  6. The right ventricle is not well visualized; grossly  normal right ventricular systolic function.  7. Estimated right ventricular systolic pressure equals 60  mm Hg, assuming right atrial pressure equals 10 mm Hg,  consistent with moderate pulmonary hypertension.  ------------------------------------------------------------------------  Confirmed on  3/17/2022 - 12:50:26 by Angel Rolle M.D.,  [facility]    < end of copied text >      ASSESSMENT/PLAN: 	73-year-old male with history of  HFpEF, CKD,  hypertension, chronic A-fib on warfarin (OP Cardio Dr Crowley at [facility]), who presents with 1 week of LE edema and 3 weeks of SOB. He was found with acute influenza, Acute hypoxemic and hypercapnic respiratory failure, and acute on chronic HFpEF    #SOB  --multifactorial  --on Tamiflu, on IV Lasix, and was on Bipap  --Strict I/Os, recommend resume IV Lasix, remains volume overloaded  --monitor renal Function      #HFpEF  -- IV diuresis  --cont Coreg and Hydralazine for HTN and afterload reduction  --check TTE    #chronic Afib  --rates well controlled on Coreg  --cont lifelong AC   --on Coumadin, Goal INR 2-3  --?able to switch to NOAC

## 2024-03-04 NOTE — PROGRESS NOTE ADULT - SUBJECTIVE AND OBJECTIVE BOX
PULMONARY PROGRESS NOTE    PATIENT INFORMATION:  NAME: JOYCE KNIGHT:  MRN: [id number]    CHIEF COMPLAINT: Patient is a 73y old  Male who presents with a chief complaint of ADHF (04 Mar 2024 16:53)      [x] INITIAL CONSULT, H&P, FAMILY HISTORY and PAST MEDICAL AND SURGICAL HISTORY REVIEWED    OVERNIGHT EVENTS, CHANGES TO HPI, SUBJECTIVE:   - Patient seen and examined at bedside  - No overnight events  - Symptoms stable/improving    ========================REVIEW OF SYSTEMS========================  [x] ROS negative except as per HPI    ========================MEDICATIONS=============================  NEURO    CARDIO  carvedilol 25 milliGRAM(s) Oral every 12 hours  hydrALAZINE 100 milliGRAM(s) Oral three times a day    PULM  albuterol/ipratropium for Nebulization 3 milliLiter(s) Nebulizer every 6 hours    FEN/GI  pantoprazole    Tablet 40 milliGRAM(s) Oral before breakfast    HEME/ONC  warfarin 6.5 milliGRAM(s) Oral once    ANTIMICROBIALS  oseltamivir 30 milliGRAM(s) Oral every 12 hours    ENDO  allopurinol 100 milliGRAM(s) Oral daily  atorvastatin 10 milliGRAM(s) Oral at bedtime    OTHER  influenza  Vaccine (HIGH DOSE) 0.7 milliLiter(s) IntraMuscular once      PRN      ========================PHYSICAL EXAM============================    VITALS: Vital Signs Last 24 Hrs  T(C): 37.1 (04 Mar 2024 18:18), Max: 37.4 (03 Mar 2024 22:05)  T(F): 98.7 (04 Mar 2024 18:18), Max: 99.4 (03 Mar 2024 22:05)  HR: 70 (04 Mar 2024 18:18) (53 - 88)  BP: 135/69 (04 Mar 2024 18:18) (118/57 - 135/69)  BP(mean): --  RR: 18 (04 Mar 2024 18:18) (17 - 19)  SpO2: 98% (04 Mar 2024 18:18) (98% - 100%)    Parameters below as of 04 Mar 2024 18:18  Patient On (Oxygen Delivery Method): nasal cannula  O2 Flow (L/min): 2      INTAKE and OUTPUT: I&O's Detail    03 Mar 2024 07:01  -  04 Mar 2024 07:00  --------------------------------------------------------  IN:    Oral Fluid: 1050 mL  Total IN: 1050 mL    OUT:    Voided (mL): 1150 mL  Total OUT: 1150 mL    Total NET: -100 mL      04 Mar 2024 07:01  -  04 Mar 2024 21:09  --------------------------------------------------------  IN:    Oral Fluid: 600 mL  Total IN: 600 mL    OUT:    Voided (mL): 1000 mL  Total OUT: 1000 mL    Total NET: -400 mL          VENTILATOR SETTINGS: Mode: standby      Physical Exam  CONST:        NAD, well-developed, awake and alert  ENMT:         MMM, no pharyngeal injection or exudates; no LAD  RESP:           Normal effort and expansion. B/L crackles  CHEST:        No tenderness. No cardiac devices, lines, or chest tubes.   CARD:          RRR, normal S1,S2. no MRG.  VASC:          No appreciable JVD; 1+ LE edema  ABD:            Soft, nontender, nondistended  MSK:            No clubbing or cyanosis of digits  EXT:             WWP; cap refill <2s; pulses are 2+ B/L  PSYCH:         Mood and affect appropriate  NEURO:       Non-focal; moving all extremities   SKIN:            No visible rashes on exposed skin       ======================LABORATORY RESULTS AND IMAGING=============                        9.8    3.89  )-----------( 109      ( 04 Mar 2024 05:29 )             32.3                                  03-04    144  |  107  |  48<H>  ----------------------------<  83  4.7   |  27  |  1.94<H>    Ca    8.1<L>      04 Mar 2024 05:29  Phos  2.8     03-04  Mg     2.20     03-04    TPro  6.3  /  Alb  3.3  /  TBili  0.5  /  DBili  x   /  AST  20  /  ALT  17  /  AlkPhos  86  03-04    N:4.63/L:0.56= __ 03-01-24 @ 06:17  N:4.11/L:0.59= __ 02-29-24 @ 14:31    Ref-range: <3 normal, >9 elevated, >18 very elevated    Procalcitonin, Serum: 0.07 ng/mL (03-01-24 @ 06:17)        ABG Trend  03-01-24 @ 16:14 [id number]  - 7.31/56/105/28/97.8 Lactate --  03-01-24 @ 14:30 [id number]  - 7.28/61/41/29/66.6 Lactate --  03-01-24 @ 08:24 [id number]  - 7.26/64/40/29/68.9 Lactate --    VBG Trend  03-04-24 @ 13:30 FiO2--  - 7.23/78/29/33/45.4 Lactate 0.9  03-03-24 @ 06:15 FiO2--  - 7.26/63/62/28/91.5 Lactate 1.1  03-02-24 @ 00:55 FiO2--  - 7.24/70/44/30/71.1 Lactate --  03-01-24 @ 06:17 FiO2--  - 7.22/70/71/29/93.6 Lactate 0.9  03-01-24 @ 01:23 FiO2--  - 7.22/69/58/28/88.0 Lactate 0.7      Creatinine Trend: 1.94<--, 2.04<--, 2.23<--, 1.74<--, 1.73<--, 1.67<--    [x] RADIOLOGY REVIEWED AND INTERPRETED BY ME    TTE W or WO Ultrasound Enhancing Agent:   TRANSTHORACIC ECHOCARDIOGRAM REPORT  ________________________________________________________________________________                                      _______       Pt. Name:       JOYCE KNIGHT Study Date:    3/4/2024  MRN:            [id number]         YOB: 1950  Accession #:    [id number]         Age:           73 years  Account#:       [id number]          Gender:        M  Heart Rate:                       Height:        70.87 in (180.00 cm)  Rhythm:    Weight:        299.99 lb (136.08 kg)  Blood Pressure: 124/63 mmHg       BSA/BMI:       2.50 m² / 42.00 kg/m²  ________________________________________________________________________________________  Referring Physician:    [id number] Merlin Mathew  Interpreting Physician: Angel Rolle M.D.  Primary Sonographer:    Lee Ann Gaelano [facility]  Fellow (Interpreting):  Doretha Unger Md    CPT:                ECHO TTE WITH CON COMP W DOPP - .m;DEFINITY ECHO                      CONTRAST PER ML - .m  Indication(s):      Cardiomyopathy, unspecified - I42.9  Procedure:          Transthoracic echocardiogram with 2-D, M-mode and complete                      spectral and color flow Doppler.  Ordering Location:  [facility]  Admission Status:  Inpatient  Contrast Injection: Verbal consent was obtained for injection of Ultrasonic                      Enhancing Agent following a discussion of risks and                      benefits.                      Endocardial visualization enhanced with 2 ml of Definity                      Ultrasound enhancing agent (Lot#:[id number] Exp.Date:02/2025                      Discarded Dose:8ml).  UEA Reaction:       Patient had no adverse reaction after injection of                      Ultrasound Enhancing Agent.  Study Information:  Image quality for this study is technically difficult.    _______________________________________________________________________________________     CONCLUSIONS:      1. Technically difficult image quality.   2. The leftventricular cavity is normal in size. Left ventricular wall thickness is mildly increased. Left ventricular systolic function is normal with an ejection fraction visually estimated at 60 to 65%. There are no regional wall motion abnormalities seen. Mild left ventricular hypertrophy. There is increased LV mass and concentric hypertrophy.   3. The right ventricle is not well visualized.   4. Structurally normal mitral valve with normal leaflet excursion. There is calcification of the mitral valve annulus. There is mild mitral regurgitation.    ________________________________________________________________________________________  FINDINGS:     Left Ventricle:  The left ventricular cavity is normal in size. Left ventricular wall thickness is mildly increased. Left ventricular systolic function is normal with an ejection fraction visually estimated at 60 to 65%. There are no regional wall motion abnormalities seen. Mild left ventricular hypertrophy. There is increased LV mass and concentrichypertrophy.     Right Ventricle:  The right ventricle is not well visualized.     Left Atrium:  The left atrium is normal.     Right Atrium:  The right atrium was not well visualized.     Aortic Valve:  The aortic valve appears trileaflet with normal systolic excursion. There is calcification of the aortic valve leaflets. There is no evidence of aortic regurgitation.     Mitral Valve:  Structurally normal mitral valve with normal leaflet excursion. There is calcification of the mitral valve annulus. There is mild mitral regurgitation.     Tricuspid Valve:  Structurally normal tricuspid valve with normal leaflet excursion. There is moderate to severe tricuspid regurgitation. Estimated pulmonary artery systolic pressure is 57 mmHg.     Pulmonic Valve:  Structurally normal pulmonic valve with normal leaflet excursion. There is mild pulmonic regurgitation.     Aorta:  The aortic root at the sinuses of Valsalva is normal in size, measuring 3.30 cm (indexed 1.32 cm/m²). The ascending aorta diameter is normal in size, measuring 3.60 cm (indexed 1.44 cm/m²).     Pericardium:  There is a trace pericardial effusion.     Systemic Veins:  The inferior vena cava is dilated measuring 4.40 cm in diameter, (dilated >2.1cm) with abnormal inspiratory collapse (abnormal <50%) consistent with elevated right atrial pressure (~15, range 10-20mmHg).  ____________________________________________________________________  QUANTITATIVE DATA:  Left Ventricle Measurements: (Indexed to BSA)     IVSd (2D):   1.4 cm  LVPWd (2D):  1.3 cm  LVIDd (2D):  5.2 cm  LVIDs (2D):  3.0 cm  LV Mass:     294 g  117.4 g/m²  Visualized LV EF%: 60 to 65%     MV E Vmax:    1.03 m/s  MV A Vmax:    0.25 m/s  MV E/A:       4.07  e' lateral:   14.44 cm/s  e' medial:    10.07 cm/s  E/e' lateral: 7.14  E/e' medial:  10.24  E/e' Average: 8.41  MV DT:        178 msec    Aorta Measurements: (Normal range) (Indexed to BSA)     Sinuses of Valsalva: 3.30 cm (3.1 - 3.7 cm)  Ao Root              3.6 cm  Ao Asc:              3.8 cm  73-year-old male with history of  HFpEF, CKD,  hypertension, A-fib on warfarin comes into the ED from doctor's office for evaluation of shortness of breath. CXR with cardiomegaly and elevated BNP. Flu+    # Acute hypoxemic and hypercapnic respiratory failure   # Influenza pneumonia  # Acute decompensated heart failure    - tamiflu x5 days  - sputum cx, blood cx, MRSA/MSSA nasal PCR, urine legionella, urine strep ag  - c/w diuresis, strict I&O, goal net negative 1-2L/day  - please check blood gas in the AMAo Asc prox:         3.60 cm       Left Atrium Measurements: (Indexed to BSA)  LA Diam 2D: 5.12 cm    Right Ventricle Measurements: Right Atrial Measurements:     TV Consuelo. S': 9.83 cm/s         RA Vol:       403.00 ml                                RA Vol Index: 161.07 ml/m²       LVOT / RVOT/ Qp/Qs Data: (Indexed to BSA)  LVOT Diameter: 2.07 cm    Mitral Valve Measurements:     MV E Vmax: 1.0 m/s  MV A Vmax: 0.3 m/s  MV E/A:    4.1       Tricuspid Valve Measurements:     TR Vmax:          3.2 m/s  TR Peak Gradient: 42.2 mmHg  RA Pressure:      15 mmHg  PASP:             57 mmHg    ________________________________________________________________________________________  Electronically signed on 3/4/2024 at 4:31:59 PM by Angel Rolle M.D.         *** Final *** (03-04-24 @ 10:33)

## 2024-03-04 NOTE — PROGRESS NOTE ADULT - SUBJECTIVE AND OBJECTIVE BOX
**********************************************  [facility] Division of Hospital Medicine  Mitzy Mahajan MD  Available via MS Teams  Pager: [phone]  **********************************************     Patient is a 73y old  Male who presents with a chief complaint of ADHF (04 Mar 2024 12:13)    SUBJECTIVE / OVERNIGHT EVENTS: No acute events overnight. Patient examined at bedside this AM, with no subjective complaints. Denies CP, palpitations, SOB, n/v/d, abdominal pain.     OBJECTIVE:  Vital Signs Last 24 Hrs  T(C): 37 (04 Mar 2024 13:19), Max: 37.4 (03 Mar 2024 22:05)  T(F): 98.6 (04 Mar 2024 13:19), Max: 99.4 (03 Mar 2024 22:05)  HR: 73 (04 Mar 2024 13:19) (53 - 86)  BP: 118/77 (04 Mar 2024 13:19) (117/72 - 124/63)  BP(mean): --  RR: 18 (04 Mar 2024 13:19) (16 - 19)  SpO2: 98% (04 Mar 2024 13:19) (98% - 100%)    Parameters below as of 04 Mar 2024 13:19  Patient On (Oxygen Delivery Method): nasal cannula  O2 Flow (L/min): 2      I&O's Summary    03 Mar 2024 07:01  -  04 Mar 2024 07:00  --------------------------------------------------------  IN: 1050 mL / OUT: 1150 mL / NET: -100 mL      Physical Exam:     General: No acute distress, well-appearing    Eyes: PERRL, EOMI     ENT: MMM, no oropharyngeal lesions or erythema appreciated     Pulm: No increased WOB. CTAB. No wheezing.     CV: RRR. S1&S2+. No M/R/G appreciated.     Abdomen: +BS. Soft, NTND. No organomegaly.     MSK: Nml ROM    Extremities: No peripheral edema or cyanosis.     Neuro: A&Ox3, no focal deficits     Skin: Warm and dry. No visible rash.       Labs:  CAPILLARY BLOOD GLUCOSE                              9.8    3.89  )-----------( 109      ( 04 Mar 2024 05:29 )             32.3     03-04    144  |  107  |  48<H>  ----------------------------<  83  4.7   |  27  |  1.94<H>    Ca    8.1<L>      04 Mar 2024 05:29  Phos  2.8     03-04  Mg     2.20     03-04    TPro  6.3  /  Alb  3.3  /  TBili  0.5  /  DBili  x   /  AST  20  /  ALT  17  /  AlkPhos  86  03-04    PT/INR - ( 04 Mar 2024 05:29 )   PT: 24.1 sec;   INR: 2.18 ratio         PTT - ( 04 Mar 2024 05:29 )  PTT:36.4 sec        Urinalysis Basic - ( 04 Mar 2024 05:29 )    Color: x / Appearance: x / SG: x / pH: x  Gluc: 83 mg/dL / Ketone: x  / Bili: x / Urobili: x   Blood: x / Protein: x / Nitrite: x   Leuk Esterase: x / RBC: x / WBC x   Sq Epi: x / Non Sq Epi: x / Bacteria: x      Imaging Personally Reviewed:      MEDICATIONS  (STANDING):  albuterol/ipratropium for Nebulization 3 milliLiter(s) Nebulizer every 6 hours  allopurinol 100 milliGRAM(s) Oral daily  atorvastatin 10 milliGRAM(s) Oral at bedtime  carvedilol 25 milliGRAM(s) Oral every 12 hours  furosemide   Injectable 40 milliGRAM(s) IV Push once  hydrALAZINE 100 milliGRAM(s) Oral three times a day  influenza  Vaccine (HIGH DOSE) 0.7 milliLiter(s) IntraMuscular once  oseltamivir 30 milliGRAM(s) Oral every 12 hours  pantoprazole    Tablet 40 milliGRAM(s) Oral before breakfast  warfarin 6.5 milliGRAM(s) Oral once    MEDICATIONS  (PRN):  acetaminophen     Tablet .. 650 milliGRAM(s) Oral every 6 hours PRN Temp greater or equal to 38C (100.4F), Mild Pain (1 - 3)  melatonin 3 milliGRAM(s) Oral at bedtime PRN Insomnia

## 2024-03-05 DIAGNOSIS — J96.01 ACUTE RESPIRATORY FAILURE WITH HYPOXIA: ICD-10-CM

## 2024-03-05 LAB
ANION GAP SERPL CALC-SCNC: 7 MMOL/L — SIGNIFICANT CHANGE UP (ref 7–14)
APTT BLD: 102.9 SEC — HIGH (ref 24.5–35.6)
APTT BLD: 34.2 SEC — SIGNIFICANT CHANGE UP (ref 24.5–35.6)
BASE EXCESS BLDA CALC-SCNC: 4.9 MMOL/L — HIGH (ref -2–3)
BASE EXCESS BLDV CALC-SCNC: 5.5 MMOL/L — HIGH (ref -2–3)
BUN SERPL-MCNC: 42 MG/DL — HIGH (ref 7–23)
CA-I SERPL-SCNC: 1.17 MMOL/L — SIGNIFICANT CHANGE UP (ref 1.15–1.33)
CALCIUM SERPL-MCNC: 8.2 MG/DL — LOW (ref 8.4–10.5)
CHLORIDE BLDV-SCNC: 108 MMOL/L — SIGNIFICANT CHANGE UP (ref 96–108)
CHLORIDE SERPL-SCNC: 107 MMOL/L — SIGNIFICANT CHANGE UP (ref 98–107)
CO2 BLDA-SCNC: 35 MMOL/L — HIGH (ref 19–24)
CO2 BLDV-SCNC: 35.6 MMOL/L — HIGH (ref 22–26)
CO2 SERPL-SCNC: 31 MMOL/L — SIGNIFICANT CHANGE UP (ref 22–31)
CREAT SERPL-MCNC: 1.7 MG/DL — HIGH (ref 0.5–1.3)
EGFR: 42 ML/MIN/1.73M2 — LOW
GAS PNL BLDV: 140 MMOL/L — SIGNIFICANT CHANGE UP (ref 136–145)
GAS PNL BLDV: SIGNIFICANT CHANGE UP
GAS PNL BLDV: SIGNIFICANT CHANGE UP
GLUCOSE BLDV-MCNC: 85 MG/DL — SIGNIFICANT CHANGE UP (ref 70–99)
GLUCOSE SERPL-MCNC: 87 MG/DL — SIGNIFICANT CHANGE UP (ref 70–99)
GRAM STN FLD: ABNORMAL
HCO3 BLDA-SCNC: 33 MMOL/L — HIGH (ref 21–28)
HCO3 BLDV-SCNC: 34 MMOL/L — HIGH (ref 22–29)
HCT VFR BLD CALC: 33.1 % — LOW (ref 39–50)
HCT VFR BLD CALC: 33.4 % — LOW (ref 39–50)
HCT VFR BLDA CALC: 31 % — LOW (ref 39–51)
HGB BLD CALC-MCNC: 10.4 G/DL — LOW (ref 12.6–17.4)
HGB BLD-MCNC: 10 G/DL — LOW (ref 13–17)
HGB BLD-MCNC: 10.1 G/DL — LOW (ref 13–17)
HOROWITZ INDEX BLDA+IHG-RTO: 28 — SIGNIFICANT CHANGE UP
INR BLD: 1.83 RATIO — HIGH (ref 0.85–1.18)
LACTATE BLDV-MCNC: 1.1 MMOL/L — SIGNIFICANT CHANGE UP (ref 0.5–2)
MAGNESIUM SERPL-MCNC: 2.2 MG/DL — SIGNIFICANT CHANGE UP (ref 1.6–2.6)
MCHC RBC-ENTMCNC: 25.8 PG — LOW (ref 27–34)
MCHC RBC-ENTMCNC: 25.9 PG — LOW (ref 27–34)
MCHC RBC-ENTMCNC: 29.9 GM/DL — LOW (ref 32–36)
MCHC RBC-ENTMCNC: 30.5 GM/DL — LOW (ref 32–36)
MCV RBC AUTO: 84.9 FL — SIGNIFICANT CHANGE UP (ref 80–100)
MCV RBC AUTO: 86.3 FL — SIGNIFICANT CHANGE UP (ref 80–100)
MRSA PCR RESULT.: SIGNIFICANT CHANGE UP
NRBC # BLD: 0 /100 WBCS — SIGNIFICANT CHANGE UP (ref 0–0)
NRBC # BLD: 0 /100 WBCS — SIGNIFICANT CHANGE UP (ref 0–0)
NRBC # FLD: 0 K/UL — SIGNIFICANT CHANGE UP (ref 0–0)
NRBC # FLD: 0 K/UL — SIGNIFICANT CHANGE UP (ref 0–0)
PCO2 BLDA: 64 MMHG — HIGH (ref 35–48)
PCO2 BLDV: 68 MMHG — HIGH (ref 42–55)
PH BLDA: 7.32 — LOW (ref 7.35–7.45)
PH BLDV: 7.3 — LOW (ref 7.32–7.43)
PHOSPHATE SERPL-MCNC: 2.4 MG/DL — LOW (ref 2.5–4.5)
PLATELET # BLD AUTO: 121 K/UL — LOW (ref 150–400)
PLATELET # BLD AUTO: 125 K/UL — LOW (ref 150–400)
PO2 BLDA: 67 MMHG — LOW (ref 83–108)
PO2 BLDV: 55 MMHG — HIGH (ref 25–45)
POTASSIUM BLDV-SCNC: 4.7 MMOL/L — SIGNIFICANT CHANGE UP (ref 3.5–5.1)
POTASSIUM SERPL-MCNC: 4.8 MMOL/L — SIGNIFICANT CHANGE UP (ref 3.5–5.3)
POTASSIUM SERPL-SCNC: 4.8 MMOL/L — SIGNIFICANT CHANGE UP (ref 3.5–5.3)
PROTHROM AB SERPL-ACNC: 20.1 SEC — HIGH (ref 9.5–13)
RBC # BLD: 3.87 M/UL — LOW (ref 4.2–5.8)
RBC # BLD: 3.9 M/UL — LOW (ref 4.2–5.8)
RBC # FLD: 17.2 % — HIGH (ref 10.3–14.5)
RBC # FLD: 17.4 % — HIGH (ref 10.3–14.5)
S AUREUS DNA NOSE QL NAA+PROBE: SIGNIFICANT CHANGE UP
SAO2 % BLDA: 93.9 % — LOW (ref 94–98)
SAO2 % BLDV: 89 % — HIGH (ref 67–88)
SODIUM SERPL-SCNC: 145 MMOL/L — SIGNIFICANT CHANGE UP (ref 135–145)
SPECIMEN SOURCE: SIGNIFICANT CHANGE UP
WBC # BLD: 4.05 K/UL — SIGNIFICANT CHANGE UP (ref 3.8–10.5)
WBC # BLD: 4.19 K/UL — SIGNIFICANT CHANGE UP (ref 3.8–10.5)
WBC # FLD AUTO: 4.05 K/UL — SIGNIFICANT CHANGE UP (ref 3.8–10.5)
WBC # FLD AUTO: 4.19 K/UL — SIGNIFICANT CHANGE UP (ref 3.8–10.5)

## 2024-03-05 PROCEDURE — 99233 SBSQ HOSP IP/OBS HIGH 50: CPT | Mod: GC

## 2024-03-05 PROCEDURE — 99233 SBSQ HOSP IP/OBS HIGH 50: CPT

## 2024-03-05 RX ORDER — HEPARIN SODIUM 5000 [USP'U]/ML
10000 INJECTION INTRAVENOUS; SUBCUTANEOUS EVERY 6 HOURS
Refills: 0 | Status: DISCONTINUED | OUTPATIENT
Start: 2024-03-05 | End: 2024-03-07

## 2024-03-05 RX ORDER — FUROSEMIDE 40 MG
40 TABLET ORAL
Refills: 0 | Status: DISCONTINUED | OUTPATIENT
Start: 2024-03-05 | End: 2024-03-07

## 2024-03-05 RX ORDER — HEPARIN SODIUM 5000 [USP'U]/ML
5000 INJECTION INTRAVENOUS; SUBCUTANEOUS EVERY 6 HOURS
Refills: 0 | Status: DISCONTINUED | OUTPATIENT
Start: 2024-03-05 | End: 2024-03-07

## 2024-03-05 RX ORDER — HEPARIN SODIUM 5000 [USP'U]/ML
INJECTION INTRAVENOUS; SUBCUTANEOUS
Qty: 25000 | Refills: 0 | Status: DISCONTINUED | OUTPATIENT
Start: 2024-03-05 | End: 2024-03-07

## 2024-03-05 RX ORDER — WARFARIN SODIUM 2.5 MG/1
7.5 TABLET ORAL ONCE
Refills: 0 | Status: COMPLETED | OUTPATIENT
Start: 2024-03-05 | End: 2024-03-05

## 2024-03-05 RX ADMIN — HEPARIN SODIUM 2300 UNIT(S)/HR: 5000 INJECTION INTRAVENOUS; SUBCUTANEOUS at 19:14

## 2024-03-05 RX ADMIN — Medication 30 MILLIGRAM(S): at 05:26

## 2024-03-05 RX ADMIN — Medication 100 MILLIGRAM(S): at 13:02

## 2024-03-05 RX ADMIN — Medication 100 MILLIGRAM(S): at 23:09

## 2024-03-05 RX ADMIN — Medication 100 MILLIGRAM(S): at 13:06

## 2024-03-05 RX ADMIN — PANTOPRAZOLE SODIUM 40 MILLIGRAM(S): 20 TABLET, DELAYED RELEASE ORAL at 05:26

## 2024-03-05 RX ADMIN — Medication 3 MILLILITER(S): at 16:37

## 2024-03-05 RX ADMIN — Medication 30 MILLIGRAM(S): at 16:30

## 2024-03-05 RX ADMIN — Medication 3 MILLILITER(S): at 21:20

## 2024-03-05 RX ADMIN — Medication 100 MILLIGRAM(S): at 05:38

## 2024-03-05 RX ADMIN — Medication 40 MILLIGRAM(S): at 15:05

## 2024-03-05 RX ADMIN — Medication 3 MILLILITER(S): at 10:52

## 2024-03-05 RX ADMIN — CARVEDILOL PHOSPHATE 25 MILLIGRAM(S): 80 CAPSULE, EXTENDED RELEASE ORAL at 16:29

## 2024-03-05 RX ADMIN — ATORVASTATIN CALCIUM 10 MILLIGRAM(S): 80 TABLET, FILM COATED ORAL at 23:08

## 2024-03-05 RX ADMIN — CARVEDILOL PHOSPHATE 25 MILLIGRAM(S): 80 CAPSULE, EXTENDED RELEASE ORAL at 05:26

## 2024-03-05 RX ADMIN — Medication 3 MILLILITER(S): at 03:25

## 2024-03-05 RX ADMIN — HEPARIN SODIUM 2300 UNIT(S)/HR: 5000 INJECTION INTRAVENOUS; SUBCUTANEOUS at 13:02

## 2024-03-05 NOTE — PROVIDER CONTACT NOTE (OTHER) - ACTION/TREATMENT ORDERED:
ACP Vick Byrd made aware, acetaminophen IVPB ordered. care is ongoing.
acp notified
As per ACP Vick LICHA Byrd to hold 2200 PO meds.
As per provider Lashonda Weller (#[id number]) no further interventions needed at this time.

## 2024-03-05 NOTE — PROVIDER CONTACT NOTE (OTHER) - ASSESSMENT
3 beats vtach followed by 5 beats vtach on tele  Bp 126/62 and HR 60   pt asymptomatic resting in bed   coreg given as per orders
Patient is A&Ox4, Patient denies pain, chest pain, nausea, vomiting or dizziness.
Patient is A&Ox4, Patient denies pain, chest pain, nausea, vomiting or dizziness.
Patient is A&Ox4, Patient denies pain, chest pain, shortness of breath, nausea, vomiting or dizziness.

## 2024-03-05 NOTE — PROVIDER CONTACT NOTE (OTHER) - REASON
patient on AVAPS, unable to tolerate PO intake
vtach
temp 100.7 F
As per telemetry tech, patient has been having pauses on tele.

## 2024-03-05 NOTE — PROGRESS NOTE ADULT - SUBJECTIVE AND OBJECTIVE BOX
PULMONARY PROGRESS NOTE    PATIENT INFORMATION:  NAME: JOYCE KNIGHT:  MRN: [id number]    CHIEF COMPLAINT: Patient is a 73y old  Male who presents with a chief complaint of ADHF (04 Mar 2024 16:53)      [x] INITIAL CONSULT, H&P, FAMILY HISTORY and PAST MEDICAL AND SURGICAL HISTORY REVIEWED    OVERNIGHT EVENTS, CHANGES TO HPI, SUBJECTIVE:   - Patient seen and examined at bedside  - No overnight events  - Symptoms stable/improving    ========================REVIEW OF SYSTEMS========================  [x] ROS negative except as per HPI    ========================MEDICATIONS=============================  NEURO    CARDIO  carvedilol 25 milliGRAM(s) Oral every 12 hours  hydrALAZINE 100 milliGRAM(s) Oral three times a day    PULM  albuterol/ipratropium for Nebulization 3 milliLiter(s) Nebulizer every 6 hours    FEN/GI  pantoprazole    Tablet 40 milliGRAM(s) Oral before breakfast    HEME/ONC    ANTIMICROBIALS  oseltamivir 30 milliGRAM(s) Oral every 12 hours    ENDO  allopurinol 100 milliGRAM(s) Oral daily  atorvastatin 10 milliGRAM(s) Oral at bedtime    OTHER  influenza  Vaccine (HIGH DOSE) 0.7 milliLiter(s) IntraMuscular once      PRN      ========================PHYSICAL EXAM============================    VITALS: Vital Signs Last 24 Hrs  T(C): 36.7 (05 Mar 2024 05:25), Max: 37.1 (04 Mar 2024 18:18)  T(F): 98.1 (05 Mar 2024 05:25), Max: 98.7 (04 Mar 2024 18:18)  HR: 60 (05 Mar 2024 05:25) (60 - 88)  BP: 114/61 (05 Mar 2024 05:25) (114/61 - 135/69)  BP(mean): --  RR: 18 (05 Mar 2024 05:25) (18 - 18)  SpO2: 98% (05 Mar 2024 05:25) (96% - 100%)    Parameters below as of 05 Mar 2024 05:25  Patient On (Oxygen Delivery Method): nasal cannula  O2 Flow (L/min): 2      INTAKE and OUTPUT: I&O's Detail    04 Mar 2024 07:01  -  05 Mar 2024 07:00  --------------------------------------------------------  IN:    Oral Fluid: 600 mL  Total IN: 600 mL    OUT:    Voided (mL): 1000 mL  Total OUT: 1000 mL    Total NET: -400 mL          VENTILATOR SETTINGS: Mode: NIV (Noninvasive Ventilation)  RR (machine): 14  TV (machine): 500  FiO2: 40  PEEP: 8  P-High: 25  P-Low: 10  PIP: 21      Physical Exam  CONST:       ENMT:         RESP :         CHEST:        CARDS:      VASC:           ABD:            MSK:            NEURO:       SKIN:           ======================LABORATORY RESULTS AND IMAGING=============                        10.0   4.19  )-----------( 121      ( 05 Mar 2024 06:08 )             33.4                                  03-05    145  |  107  |  42<H>  ----------------------------<  87  4.8   |  31  |  1.70<H>    Ca    8.2<L>      05 Mar 2024 06:08  Phos  2.4     03-05  Mg     2.20     03-05    TPro  6.3  /  Alb  3.3  /  TBili  0.5  /  DBili  x   /  AST  20  /  ALT  17  /  AlkPhos  86  03-04    N:4.63/L:0.56= __ 03-01-24 @ 06:17  N:4.11/L:0.59= __ 02-29-24 @ 14:31    Ref-range: <3 normal, >9 elevated, >18 very elevated    Procalcitonin, Serum: 0.07 ng/mL (03-01-24 @ 06:17)        ABG Trend  03-01-24 @ 16:14 [id number]  - 7.31/56/105/28/97.8 Lactate --  03-01-24 @ 14:30 [id number]  - 7.28/61/41/29/66.6 Lactate --  03-01-24 @ 08:24 [id number]  - 7.26/64/40/29/68.9 Lactate --    VBG Trend  03-05-24 @ 06:08 FiO2--  - 7.30/68/55/34/89.0 Lactate 1.1  03-04-24 @ 13:30 FiO2--  - 7.23/78/29/33/45.4 Lactate 0.9  03-03-24 @ 06:15 FiO2--  - 7.26/63/62/28/91.5 Lactate 1.1  03-02-24 @ 00:55 FiO2--  - 7.24/70/44/30/71.1 Lactate --  03-01-24 @ 06:17 FiO2--  - 7.22/70/71/29/93.6 Lactate 0.9      Creatinine Trend: 1.70<--, 1.94<--, 2.04<--, 2.23<--, 1.74<--, 1.73<--    [x] RADIOLOGY REVIEWED AND INTERPRETED BY ME    TTE W or WO Ultrasound Enhancing Agent:   TRANSTHORACIC ECHOCARDIOGRAM REPORT  ________________________________________________________________________________                                      _______       Pt. Name:       JOYCE KNIGHT Study Date:    3/4/2024  MRN:            [id number]         YOB: 1950  Accession #:    [id number]         Age:           73 years  Account#:       [id number]          Gender:        M  Heart Rate:                       Height:        70.87 in (180.00 cm)  Rhythm:    Weight:        299.99 lb (136.08 kg)  Blood Pressure: 124/63 mmHg       BSA/BMI:       2.50 m² / 42.00 kg/m²  ________________________________________________________________________________________  Referring Physician:    [id number] Merlin Mathew  Interpreting Physician: Angel Rolle M.D.  Primary Sonographer:    Lee Ann Galeano [facility]  Fellow (Interpreting):  Doretha Unger Md    CPT:                ECHO TTE WITH CON COMP W DOPP - .m;DEFINITY ECHO                      CONTRAST PER ML - .m  Indication(s):      Cardiomyopathy, unspecified - I42.9  Procedure:          Transthoracic echocardiogram with 2-D, M-mode and complete                      spectral and color flow Doppler.  [facility]  Admission Status:  Inpatient  Contrast Injection: Verbal consent was obtained for injection of Ultrasonic                      Enhancing Agent following a discussion of risks and                      benefits.                      Endocardial visualization enhanced with 2 ml of Definity                      Ultrasound enhancing agent (Lot#:[id number] Exp.Date:02/2025                      Discarded Dose:8ml).  UEA Reaction:       Patient had no adverse reaction after injection of                      Ultrasound Enhancing Agent.  Study Information:  Image quality for this study is technically difficult.    _______________________________________________________________________________________     CONCLUSIONS:      1. Technically difficult image quality.   2. The leftventricular cavity is normal in size. Left ventricular wall thickness is mildly increased. Left ventricular systolic function is normal with an ejection fraction visually estimated at 60 to 65%. There are no regional wall motion abnormalities seen. Mild left ventricular hypertrophy. There is increased LV mass and concentric hypertrophy.   3. The right ventricle is not well visualized.   4. Structurally normal mitral valve with normal leaflet excursion. There is calcification of the mitral valve annulus. There is mild mitral regurgitation.    ________________________________________________________________________________________  FINDINGS:     Left Ventricle:  The left ventricular cavity is normal in size. Left ventricular wall thickness is mildly increased. Left ventricular systolic function is normal with an ejection fraction visually estimated at 60 to 65%. There are no regional wall motion abnormalities seen. Mild left ventricular hypertrophy. There is increased LV mass and concentrichypertrophy.     Right Ventricle:  The right ventricle is not well visualized.     Left Atrium:  The left atrium is normal.     Right Atrium:  The right atrium was not well visualized.     Aortic Valve:  The aortic valve appears trileaflet with normal systolic excursion. There is calcification of the aortic valve leaflets. There is no evidence of aortic regurgitation.     Mitral Valve:  Structurally normal mitral valve with normal leaflet excursion. There is calcification of the mitral valve annulus. There is mild mitral regurgitation.     Tricuspid Valve:  Structurally normal tricuspid valve with normal leaflet excursion. There is moderate to severe tricuspid regurgitation. Estimated pulmonary artery systolic pressure is 57 mmHg.     Pulmonic Valve:  Structurally normal pulmonic valve with normal leaflet excursion. There is mild pulmonic regurgitation.     Aorta:  The aortic root at the sinuses of Valsalva is normal in size, measuring 3.30 cm (indexed 1.32 cm/m²). The ascending aorta diameter is normal in size, measuring 3.60 cm (indexed 1.44 cm/m²).     Pericardium:  There is a trace pericardial effusion.     Systemic Veins:  The inferior vena cava is dilated measuring 4.40 cm in diameter, (dilated >2.1cm) with abnormal inspiratory collapse (abnormal <50%) consistent with elevated right atrial pressure (~15, range 10-20mmHg).  ____________________________________________________________________  QUANTITATIVE DATA:  Left Ventricle Measurements: (Indexed to BSA)     IVSd (2D):   1.4 cm  LVPWd (2D):  1.3 cm  LVIDd (2D):  5.2 cm  LVIDs (2D):  3.0 cm  LV Mass:     294 g  117.4 g/m²  Visualized LV EF%: 60 to 65%     MV E Vmax:    1.03 m/s  MV A Vmax:    0.25 m/s  MV E/A:       4.07  e' lateral:   14.44 cm/s  e' medial:    10.07 cm/s  E/e' lateral: 7.14  E/e' medial:  10.24  E/e' Average: 8.41  MV DT:        178 msec    Aorta Measurements: (Normal range) (Indexed to BSA)     Sinuses of Valsalva: 3.30 cm (3.1 - 3.7 cm)  Ao Root              3.6 cm  Ao Asc:              3.8 cm  Ao Asc prox:         3.60 cm       Left Atrium Measurements: (Indexed to BSA)  LA Diam 2D: 5.12 cm    Right Ventricle Measurements: Right Atrial Measurements:     TV Consuelo. S': 9.83 cm/s         RA Vol:       403.00 ml                                RA Vol Index: 161.07 ml/m²       LVOT / RVOT/ Qp/Qs Data: (Indexed to BSA)  LVOT Diameter: 2.07 cm    Mitral Valve Measurements:     MV E Vmax: 1.0 m/s  MV A Vmax: 0.3 m/s  MV E/A:    4.1       Tricuspid Valve Measurements:     TR Vmax:          3.2 m/s  TR Peak Gradient: 42.2 mmHg  RA Pressure:      15 mmHg  PASP:             57 mmHg    ________________________________________________________________________________________  Electronically signed on 3/4/2024 at 4:31:59 PM by Angel Rolle M.D.         *** Final *** (03-04-24 @ 10:33)   PULMONARY PROGRESS NOTE    PATIENT INFORMATION:  NAME: JOYCE KNIGHT:  MRN: [id number]    CHIEF COMPLAINT: Patient is a 73y old  Male who presents with a chief complaint of ADHF (04 Mar 2024 16:53)      [x] INITIAL CONSULT, H&P, FAMILY HISTORY and PAST MEDICAL AND SURGICAL HISTORY REVIEWED    OVERNIGHT EVENTS, CHANGES TO HPI, SUBJECTIVE:   - Patient seen and examined at bedside  - No overnight events  - Symptoms stable/improving    ========================REVIEW OF SYSTEMS========================  [x] ROS negative except as per HPI    ========================MEDICATIONS=============================  NEURO    CARDIO  carvedilol 25 milliGRAM(s) Oral every 12 hours  hydrALAZINE 100 milliGRAM(s) Oral three times a day    PULM  albuterol/ipratropium for Nebulization 3 milliLiter(s) Nebulizer every 6 hours    FEN/GI  pantoprazole    Tablet 40 milliGRAM(s) Oral before breakfast    HEME/ONC    ANTIMICROBIALS  oseltamivir 30 milliGRAM(s) Oral every 12 hours    ENDO  allopurinol 100 milliGRAM(s) Oral daily  atorvastatin 10 milliGRAM(s) Oral at bedtime    OTHER  influenza  Vaccine (HIGH DOSE) 0.7 milliLiter(s) IntraMuscular once      PRN      ========================PHYSICAL EXAM============================    VITALS: Vital Signs Last 24 Hrs  T(C): 36.7 (05 Mar 2024 05:25), Max: 37.1 (04 Mar 2024 18:18)  T(F): 98.1 (05 Mar 2024 05:25), Max: 98.7 (04 Mar 2024 18:18)  HR: 60 (05 Mar 2024 05:25) (60 - 88)  BP: 114/61 (05 Mar 2024 05:25) (114/61 - 135/69)  BP(mean): --  RR: 18 (05 Mar 2024 05:25) (18 - 18)  SpO2: 98% (05 Mar 2024 05:25) (96% - 100%)    Parameters below as of 05 Mar 2024 05:25  Patient On (Oxygen Delivery Method): nasal cannula  O2 Flow (L/min): 2      INTAKE and OUTPUT: I&O's Detail    04 Mar 2024 07:01  -  05 Mar 2024 07:00  --------------------------------------------------------  IN:    Oral Fluid: 600 mL  Total IN: 600 mL    OUT:    Voided (mL): 1000 mL  Total OUT: 1000 mL    Total NET: -400 mL          VENTILATOR SETTINGS: Mode: NIV (Noninvasive Ventilation)  RR (machine): 14  TV (machine): 500  FiO2: 40  PEEP: 8  P-High: 25  P-Low: 10  PIP: 21      Physical Exam  CONST:        NAD, well-developed, awake and alert  ENMT:         MMM, no pharyngeal injection or exudates; no LAD  RESP:           Normal effort and expansion. bilateral crackles  CHEST:        No tenderness. No cardiac devices, lines, or chest tubes.   CARD:          RRR, normal S1,S2. no MRG.  VASC:          No appreciable JVD; 1+ LE edema  ABD:            Soft, nontender, nondistended  MSK:            No clubbing or cyanosis of digits  EXT:             WWP; cap refill <2s; pulses are 2+ B/L  PSYCH:         Mood and affect appropriate  NEURO:       Non-focal; moving all extremities   SKIN:            No visible rashes on exposed skin       ======================LABORATORY RESULTS AND IMAGING=============                        10.0   4.19  )-----------( 121      ( 05 Mar 2024 06:08 )             33.4                                  03-05    145  |  107  |  42<H>  ----------------------------<  87  4.8   |  31  |  1.70<H>    Ca    8.2<L>      05 Mar 2024 06:08  Phos  2.4     03-05  Mg     2.20     03-05    TPro  6.3  /  Alb  3.3  /  TBili  0.5  /  DBili  x   /  AST  20  /  ALT  17  /  AlkPhos  86  03-04    N:4.63/L:0.56= __ 03-01-24 @ 06:17  N:4.11/L:0.59= __ 02-29-24 @ 14:31    Ref-range: <3 normal, >9 elevated, >18 very elevated    Procalcitonin, Serum: 0.07 ng/mL (03-01-24 @ 06:17)        ABG Trend  03-01-24 @ 16:14 [id number]  - 7.31/56/105/28/97.8 Lactate --  03-01-24 @ 14:30 [id number]  - 7.28/61/41/29/66.6 Lactate --  03-01-24 @ 08:24 [id number]  - 7.26/64/40/29/68.9 Lactate --    VBG Trend  03-05-24 @ 06:08 FiO2--  - 7.30/68/55/34/89.0 Lactate 1.1  03-04-24 @ 13:30 FiO2--  - 7.23/78/29/33/45.4 Lactate 0.9  03-03-24 @ 06:15 FiO2--  - 7.26/63/62/28/91.5 Lactate 1.1  03-02-24 @ 00:55 FiO2--  - 7.24/70/44/30/71.1 Lactate --  03-01-24 @ 06:17 FiO2--  - 7.22/70/71/29/93.6 Lactate 0.9      Creatinine Trend: 1.70<--, 1.94<--, 2.04<--, 2.23<--, 1.74<--, 1.73<--    [x] RADIOLOGY REVIEWED AND INTERPRETED BY ME    TTE W or WO Ultrasound Enhancing Agent:   TRANSTHORACIC ECHOCARDIOGRAM REPORT  ________________________________________________________________________________                                      _______       Pt. Name:       JOYCE KNIGHT Study Date:    3/4/2024  MRN:            [id number]         YOB: 1950  Accession #:    [id number]         Age:           73 years  Account#:       [id number]          Gender:        M  Heart Rate:                       Height:        70.87 in (180.00 cm)  Rhythm:    Weight:        299.99 lb (136.08 kg)  Blood Pressure: 124/63 mmHg       BSA/BMI:       2.50 m² / 42.00 kg/m²  ________________________________________________________________________________________  Referring Physician:    [id number] Merlin Mathew  Interpreting Physician: Angel Rolle M.D.  Primary Sonographer:    Lee Ann Galeano [facility]  Fellow (Interpreting):  Doretha Unger Md    CPT:                ECHO TTE WITH CON COMP W DOPP - .m;DEFINITY ECHO                      CONTRAST PER ML - .m  Indication(s):      Cardiomyopathy, unspecified - I42.9  Procedure:          Transthoracic echocardiogram with 2-D, M-mode and complete                      spectral and color flow Doppler.  [facility]  Admission Status:  Inpatient  Contrast Injection: Verbal consent was obtained for injection of Ultrasonic                      Enhancing Agent following a discussion of risks and                      benefits.                      Endocardial visualization enhanced with 2 ml of Definity                      Ultrasound enhancing agent (Lot#:[id number] Exp.Date:02/2025                      Discarded Dose:8ml).  UEA Reaction:       Patient had no adverse reaction after injection of                      Ultrasound Enhancing Agent.  Study Information:  Image quality for this study is technically difficult.    _______________________________________________________________________________________     CONCLUSIONS:      1. Technically difficult image quality.   2. The leftventricular cavity is normal in size. Left ventricular wall thickness is mildly increased. Left ventricular systolic function is normal with an ejection fraction visually estimated at 60 to 65%. There are no regional wall motion abnormalities seen. Mild left ventricular hypertrophy. There is increased LV mass and concentric hypertrophy.   3. The right ventricle is not well visualized.   4. Structurally normal mitral valve with normal leaflet excursion. There is calcification of the mitral valve annulus. There is mild mitral regurgitation.    ________________________________________________________________________________________  FINDINGS:     Left Ventricle:  The left ventricular cavity is normal in size. Left ventricular wall thickness is mildly increased. Left ventricular systolic function is normal with an ejection fraction visually estimated at 60 to 65%. There are no regional wall motion abnormalities seen. Mild left ventricular hypertrophy. There is increased LV mass and concentrichypertrophy.     Right Ventricle:  The right ventricle is not well visualized.     Left Atrium:  The left atrium is normal.     Right Atrium:  The right atrium was not well visualized.     Aortic Valve:  The aortic valve appears trileaflet with normal systolic excursion. There is calcification of the aortic valve leaflets. There is no evidence of aortic regurgitation.     Mitral Valve:  Structurally normal mitral valve with normal leaflet excursion. There is calcification of the mitral valve annulus. There is mild mitral regurgitation.     Tricuspid Valve:  Structurally normal tricuspid valve with normal leaflet excursion. There is moderate to severe tricuspid regurgitation. Estimated pulmonary artery systolic pressure is 57 mmHg.     Pulmonic Valve:  Structurally normal pulmonic valve with normal leaflet excursion. There is mild pulmonic regurgitation.     Aorta:  The aortic root at the sinuses of Valsalva is normal in size, measuring 3.30 cm (indexed 1.32 cm/m²). The ascending aorta diameter is normal in size, measuring 3.60 cm (indexed 1.44 cm/m²).     Pericardium:  There is a trace pericardial effusion.     Systemic Veins:  The inferior vena cava is dilated measuring 4.40 cm in diameter, (dilated >2.1cm) with abnormal inspiratory collapse (abnormal <50%) consistent with elevated right atrial pressure (~15, range 10-20mmHg).  ____________________________________________________________________  QUANTITATIVE DATA:  Left Ventricle Measurements: (Indexed to BSA)     IVSd (2D):   1.4 cm  LVPWd (2D):  1.3 cm  LVIDd (2D):  5.2 cm  LVIDs (2D):  3.0 cm  LV Mass:     294 g  117.4 g/m²  Visualized LV EF%: 60 to 65%     MV E Vmax:    1.03 m/s  MV A Vmax:    0.25 m/s  MV E/A:       4.07  e' lateral:   14.44 cm/s  e' medial:    10.07 cm/s  E/e' lateral: 7.14  E/e' medial:  10.24  E/e' Average: 8.41  MV DT:        178 msec    Aorta Measurements: (Normal range) (Indexed to BSA)     Sinuses of Valsalva: 3.30 cm (3.1 - 3.7 cm)  Ao Root              3.6 cm  Ao Asc:              3.8 cm  Ao Asc prox:         3.60 cm       Left Atrium Measurements: (Indexed to BSA)  LA Diam 2D: 5.12 cm    Right Ventricle Measurements: Right Atrial Measurements:     TV Consuelo. S': 9.83 cm/s         RA Vol:       403.00 ml                                RA Vol Index: 161.07 ml/m²       LVOT / RVOT/ Qp/Qs Data: (Indexed to BSA)  LVOT Diameter: 2.07 cm    Mitral Valve Measurements:     MV E Vmax: 1.0 m/s  MV A Vmax: 0.3 m/s  MV E/A:    4.1       Tricuspid Valve Measurements:     TR Vmax:          3.2 m/s  TR Peak Gradient: 42.2 mmHg  RA Pressure:      15 mmHg  PASP:             57 mmHg    ________________________________________________________________________________________  Electronically signed on 3/4/2024 at 4:31:59 PM by Angel Rolle M.D.         *** Final *** (03-04-24 @ 10:33)

## 2024-03-05 NOTE — PROGRESS NOTE ADULT - SUBJECTIVE AND OBJECTIVE BOX
Date of service 3/5/24    extended hpi: 73-year-old male with history of  HFpEF, CKD,  hypertension, chronic A-fib on warfarin (OP Cardio Dr Johansen), who presents with 1 week of LE edema and 3 weeks of SOB. He was found with acute influenza, Acute hypoxemic and hypercapnic respiratory failure, and acute on chronic HFpEF    SOb improving, no chest pain    Review of Systems:   Constitutional: [ ] fevers, [ ] chills.   Skin: [ ] dry skin. [ ] rashes.  Psychiatric: [ ] depression, [ ] anxiety.   Gastrointestinal: [ ] BRBPR, [ ] melena.   Neurological: [ ] confusion. [ ] seizures. [ ] shuffling gait.   Ears,Nose,Mouth and Throat: [ ] ear pain [ ] sore throat.   Eyes: [ ] diplopia.   Respiratory: [ ] hemoptysis. [ ] shortness of breath  Cardiovascular: See HPI above  Hematologic/Lymphatic: [ ] anemia. [ ] painful nodes. [ ] prolonged bleeding.   Genitourinary: [ ] hematuria. [ ] flank pain.   Endocrine: [ ] significant change in weight. [ ] intolerance to heat and cold.     Review of systems [x ] otherwise negative, [ ] otherwise unable to obtain    FH: no family history of sudden cardiac death in first degree relatives    SH: [ ] tobacco, [ ] alcohol, [ ] drugs    acetaminophen     Tablet .. 650 milliGRAM(s) Oral every 6 hours PRN  albuterol/ipratropium for Nebulization 3 milliLiter(s) Nebulizer every 6 hours  allopurinol 100 milliGRAM(s) Oral daily  atorvastatin 10 milliGRAM(s) Oral at bedtime  carvedilol 25 milliGRAM(s) Oral every 12 hours  heparin   Injectable 5000 Unit(s) IV Push every 6 hours PRN  heparin   Injectable 55147 Unit(s) IV Push every 6 hours PRN  heparin  Infusion.  Unit(s)/Hr IV Continuous <Continuous>  hydrALAZINE 100 milliGRAM(s) Oral three times a day  influenza  Vaccine (HIGH DOSE) 0.7 milliLiter(s) IntraMuscular once  melatonin 3 milliGRAM(s) Oral at bedtime PRN  oseltamivir 30 milliGRAM(s) Oral every 12 hours  pantoprazole    Tablet 40 milliGRAM(s) Oral before breakfast                            10.0   4.19  )-----------( 121      ( 05 Mar 2024 06:08 )             33.4       145  |  107  |  42<H>  ----------------------------<  87  4.8   |  31  |  1.70<H>    Ca    8.2<L>      05 Mar 2024 06:08  Phos  2.4     03-05  Mg     2.20     03-05    TPro  6.3  /  Alb  3.3  /  TBili  0.5  /  DBili  x   /  AST  20  /  ALT  17  /  AlkPhos  86  03-04      T(C): 36.7 (03-05-24 @ 05:25), Max: 37.1 (03-04-24 @ 18:18)  HR: 66 (03-05-24 @ 10:52) (60 - 88)  BP: 114/61 (03-05-24 @ 05:25) (114/61 - 135/69)  RR: 18 (03-05-24 @ 05:25) (18 - 18)  SpO2: 98% (03-05-24 @ 05:25) (96% - 100%)  Wt(kg): --    I&O's Summary    04 Mar 2024 07:01  -  05 Mar 2024 07:00  --------------------------------------------------------  IN: 600 mL / OUT: 1000 mL / NET: -400 mL    Gen: Appears well in NAD  HEENT:  (-)icterus (-)pallor  CV: N S1 S2 1/6 BRANDON (+)2 Pulses B/l  Resp:  Clear to ausculatation B/L, normal effort  GI: (+) BS Soft, NT, ND  Lymph:  (2+)LE Edema, (-)obvious lymphadenopathy  Skin: Warm to touch, Normal turgor  Psych: Appropriate mood and affect      TELEMETRY: 	 Afib     ECG:  	AFib    < from: Xray Chest 2 Views PA/Lat (02.29.24 @ 15:08) >    IMPRESSION:    Mildly prominent perihilar interstitial markings.    Cardiomegaly.    < end of copied text >    < from: TTE with Doppler (w/Cont) (03.17.22 @ 11:13) >  CONCLUSIONS:  1. Mitral annular calcification, otherwise normal mitral  valve. Mild mitral regurgitation.  2. Severely dilated left atrium.  LA volume index = 69  cc/m2.  3. Mild concentric left ventricular hypertrophy.  4. Endocardium not well visualized; grossly normal left  ventricular systolic function.  Endocardial visualization  enhanced with intravenous injection of echo contrast  (Definity).  5. Severe right atrial enlargement.  6. The right ventricle is not well visualized; grossly  normal right ventricular systolic function.  7. Estimated right ventricular systolic pressure equals 60  mm Hg, assuming right atrial pressure equals 10 mm Hg,  consistent with moderate pulmonary hypertension.  ------------------------------------------------------------------------  Confirmed on  3/17/2022 - 12:50:26 by Angel Rolle M.D.,  [facility], FASE    < end of copied text >    < from: TTE W or WO Ultrasound Enhancing Agent (03.04.24 @ 10:33) >  CONCLUSIONS:      1. Technically difficult image quality.   2. The leftventricular cavity is normal in size. Left ventricular wall thickness is mildly increased. Left ventricular systolic function is normal with an ejection fraction visually estimated at 60 to 65%. There are no regional wall motion abnormalities seen. Mild left ventricular hypertrophy. There is increased LV mass and concentric hypertrophy.   3. The right ventricle is not well visualized.   4. Structurally normal mitral valve with normal leaflet excursion. There is calcification of the mitral valve annulus. There is mild mitral regurgitation.    < end of copied text >      ASSESSMENT/PLAN: 	73-year-old male with history of  HFpEF, CKD,  hypertension, chronic A-fib on warfarin (OP Cardio Dr Crowley at [facility]), who presents with 1 week of LE edema and 3 weeks of SOB. He was found with acute influenza, Acute hypoxemic and hypercapnic respiratory failure, and acute on chronic HFpEF    #SOB  --multifactorial  --on Tamiflu, s/p IV Lasix, and was on Bipap  --improved   --renal function stable      #HFpEF  --s/p IV diuresis, now compensated, resume home oral diuretic regimen (Lasix 40 PO BID)  --cont Coreg and Hydralazine for HTN and afterload reduction  --TTE with Normal LV sys function, mild LVH      #chronic Afib  --rates well controlled on Coreg  --cont lifelong AC   --on Coumadin, Goal INR 2-3  --?able to switch to NOAC

## 2024-03-05 NOTE — PROGRESS NOTE ADULT - SUBJECTIVE AND OBJECTIVE BOX
**********************************************  [facility] Division of Hospital Medicine  Mitzy Mahajan MD  Available via MS Teams  Pager: [phone]  **********************************************     Patient is a 73y old  Male who presents with a chief complaint of ADHF (05 Mar 2024 11:11)    SUBJECTIVE / OVERNIGHT EVENTS: No acute events overnight. Patient examined at bedside this AM, with no subjective complaints. Denies CP, palpitations, SOB, n/v/d, abdominal pain.     OBJECTIVE:  Vital Signs Last 24 Hrs  T(C): 36.7 (05 Mar 2024 13:00), Max: 37.1 (04 Mar 2024 18:18)  T(F): 98 (05 Mar 2024 13:00), Max: 98.7 (04 Mar 2024 18:18)  HR: 62 (05 Mar 2024 15:00) (60 - 74)  BP: 122/62 (05 Mar 2024 15:00) (111/60 - 135/69)  BP(mean): --  RR: 18 (05 Mar 2024 13:00) (18 - 18)  SpO2: 97% (05 Mar 2024 13:00) (96% - 100%)    Parameters below as of 05 Mar 2024 13:00  Patient On (Oxygen Delivery Method): nasal cannula  O2 Flow (L/min): 2      I&O's Summary    04 Mar 2024 07:01  -  05 Mar 2024 07:00  --------------------------------------------------------  IN: 600 mL / OUT: 1000 mL / NET: -400 mL    05 Mar 2024 07:01  -  05 Mar 2024 16:17  --------------------------------------------------------  IN: 23 mL / OUT: 400 mL / NET: -377 mL      Physical Exam:     General: No acute distress, well-appearing    Eyes: PERRL, EOMI     ENT: MMM, no oropharyngeal lesions or erythema appreciated     Pulm: No increased WOB. CTAB. End expiratory wheezing    CV: RRR. S1&S2+. No M/R/G appreciated.     Abdomen: +BS. Soft, NTND. No organomegaly.     MSK: Nml ROM    Extremities: No peripheral edema or cyanosis.     Neuro: A&Ox3, no focal deficits     Skin: Warm and dry. No visible rash.     Labs:  CAPILLARY BLOOD GLUCOSE                              10.0   4.19  )-----------( 121      ( 05 Mar 2024 06:08 )             33.4     03-05    145  |  107  |  42<H>  ----------------------------<  87  4.8   |  31  |  1.70<H>    Ca    8.2<L>      05 Mar 2024 06:08  Phos  2.4     03-05  Mg     2.20     03-05    TPro  6.3  /  Alb  3.3  /  TBili  0.5  /  DBili  x   /  AST  20  /  ALT  17  /  AlkPhos  86  03-04    PT/INR - ( 05 Mar 2024 06:08 )   PT: 20.1 sec;   INR: 1.83 ratio         PTT - ( 05 Mar 2024 06:08 )  PTT:34.2 sec        Urinalysis Basic - ( 05 Mar 2024 06:08 )    Color: x / Appearance: x / SG: x / pH: x  Gluc: 87 mg/dL / Ketone: x  / Bili: x / Urobili: x   Blood: x / Protein: x / Nitrite: x   Leuk Esterase: x / RBC: x / WBC x   Sq Epi: x / Non Sq Epi: x / Bacteria: x      Imaging Personally Reviewed:      MEDICATIONS  (STANDING):  albuterol/ipratropium for Nebulization 3 milliLiter(s) Nebulizer every 6 hours  allopurinol 100 milliGRAM(s) Oral daily  atorvastatin 10 milliGRAM(s) Oral at bedtime  carvedilol 25 milliGRAM(s) Oral every 12 hours  furosemide    Tablet 40 milliGRAM(s) Oral two times a day  heparin  Infusion.  Unit(s)/Hr (23 mL/Hr) IV Continuous <Continuous>  hydrALAZINE 100 milliGRAM(s) Oral three times a day  influenza  Vaccine (HIGH DOSE) 0.7 milliLiter(s) IntraMuscular once  oseltamivir 30 milliGRAM(s) Oral every 12 hours  pantoprazole    Tablet 40 milliGRAM(s) Oral before breakfast  warfarin 7.5 milliGRAM(s) Oral once    MEDICATIONS  (PRN):  acetaminophen     Tablet .. 650 milliGRAM(s) Oral every 6 hours PRN Temp greater or equal to 38C (100.4F), Mild Pain (1 - 3)  heparin   Injectable 60709 Unit(s) IV Push every 6 hours PRN For aPTT less than 40  heparin   Injectable 5000 Unit(s) IV Push every 6 hours PRN For aPTT between 40 - 57  melatonin 3 milliGRAM(s) Oral at bedtime PRN Insomnia

## 2024-03-05 NOTE — PROVIDER CONTACT NOTE (OTHER) - BACKGROUND
73 year old male admitted for heart failure
admitted for HF
73 year old male admitted for heart failure
74 yo male admitted 02/29 for SOB/acute CHF. PMH CKD, HTN, AF.

## 2024-03-05 NOTE — PROVIDER CONTACT NOTE (OTHER) - SITUATION
As per telemetry tech, patient has been having pauses on tele.
patients temeprature 100.7
3 beats vtach followed by 5 beats vtach on tele
patient on AVAPS, patient on DASH/TLC diet - scheduled for PO meds.

## 2024-03-05 NOTE — PROGRESS NOTE ADULT - ATTENDING COMMENTS
Patient is a 72 yo M w/ HFpEF, CKD, HTN, Afib on warfarin who is admitted with acute hypercapnic respiratory failure in the setting of Flu AH1 and ADHF     #Acute hypercapnic respiratory failure - Slightly improved  #Flu  #ADHF  - Complete course of Tamiflu  - c/w AVAPS, Change settings to , IPAP 12-25, EPAP 10, 40% PRN and QHS  - Please obtain AM ABG  - Diuresis as per cardiology and primary team to maintain euvolemia  - Also may have underlying MAREN, would benefit from outpatient PSG    Alejandro Lopez MD  Pulmonary & Critical Care Patient is a 74 yo M w/ HFpEF, CKD, HTN, Afib on warfarin who is admitted with acute hypercapnic respiratory failure in the setting of Flu AH1 and ADHF     #Acute hypercapnic respiratory failure - Slightly improved  #Flu  #ADHF  - Complete course of Tamiflu  - c/w AVAPS, Change settings to , IPAP 12-25, EPAP 10, 40% PRN and QHS  - Please obtain AM ABG  - Diuresis as per cardiology and primary team to maintain euvolemia  - Also may have underlying MAREN, would benefit from outpatient PSG  - Please provide and encourage incentive spirometer and ambulaiton/OOB to chair as tolerated    Alejandro Lopez MD  Pulmonary & Critical Care

## 2024-03-06 ENCOUNTER — TRANSCRIPTION ENCOUNTER (OUTPATIENT)
Age: 74
End: 2024-03-06

## 2024-03-06 LAB
ANION GAP SERPL CALC-SCNC: 8 MMOL/L — SIGNIFICANT CHANGE UP (ref 7–14)
APTT BLD: 197.3 SEC — CRITICAL HIGH (ref 24.5–35.6)
APTT BLD: 84.4 SEC — HIGH (ref 24.5–35.6)
APTT BLD: 88.9 SEC — HIGH (ref 24.5–35.6)
BASE EXCESS BLDA CALC-SCNC: 5 MMOL/L — HIGH (ref -2–3)
BUN SERPL-MCNC: 44 MG/DL — HIGH (ref 7–23)
CALCIUM SERPL-MCNC: 8.3 MG/DL — LOW (ref 8.4–10.5)
CHLORIDE SERPL-SCNC: 104 MMOL/L — SIGNIFICANT CHANGE UP (ref 98–107)
CO2 BLDA-SCNC: 34 MMOL/L — HIGH (ref 19–24)
CO2 SERPL-SCNC: 27 MMOL/L — SIGNIFICANT CHANGE UP (ref 22–31)
CREAT SERPL-MCNC: 1.56 MG/DL — HIGH (ref 0.5–1.3)
CULTURE RESULTS: SIGNIFICANT CHANGE UP
CULTURE RESULTS: SIGNIFICANT CHANGE UP
EGFR: 46 ML/MIN/1.73M2 — LOW
GAS PNL BLDA: SIGNIFICANT CHANGE UP
GLUCOSE SERPL-MCNC: 93 MG/DL — SIGNIFICANT CHANGE UP (ref 70–99)
GRAM STN FLD: ABNORMAL
HCO3 BLDA-SCNC: 32 MMOL/L — HIGH (ref 21–28)
HCT VFR BLD CALC: 32.5 % — LOW (ref 39–50)
HGB BLD-MCNC: 9.9 G/DL — LOW (ref 13–17)
HOROWITZ INDEX BLDA+IHG-RTO: 30 — SIGNIFICANT CHANGE UP
INR BLD: 1.89 RATIO — HIGH (ref 0.85–1.18)
LEGIONELLA AG UR QL: NEGATIVE — SIGNIFICANT CHANGE UP
MAGNESIUM SERPL-MCNC: 2.1 MG/DL — SIGNIFICANT CHANGE UP (ref 1.6–2.6)
MCHC RBC-ENTMCNC: 26.3 PG — LOW (ref 27–34)
MCHC RBC-ENTMCNC: 30.5 GM/DL — LOW (ref 32–36)
MCV RBC AUTO: 86.2 FL — SIGNIFICANT CHANGE UP (ref 80–100)
NRBC # BLD: 0 /100 WBCS — SIGNIFICANT CHANGE UP (ref 0–0)
NRBC # FLD: 0 K/UL — SIGNIFICANT CHANGE UP (ref 0–0)
PCO2 BLDA: 55 MMHG — HIGH (ref 35–48)
PH BLDA: 7.37 — SIGNIFICANT CHANGE UP (ref 7.35–7.45)
PHOSPHATE SERPL-MCNC: 2 MG/DL — LOW (ref 2.5–4.5)
PLATELET # BLD AUTO: 127 K/UL — LOW (ref 150–400)
PO2 BLDA: 169 MMHG — HIGH (ref 83–108)
POTASSIUM SERPL-MCNC: 4.7 MMOL/L — SIGNIFICANT CHANGE UP (ref 3.5–5.3)
POTASSIUM SERPL-SCNC: 4.7 MMOL/L — SIGNIFICANT CHANGE UP (ref 3.5–5.3)
PROTHROM AB SERPL-ACNC: 21 SEC — HIGH (ref 9.5–13)
RBC # BLD: 3.77 M/UL — LOW (ref 4.2–5.8)
RBC # FLD: 17.1 % — HIGH (ref 10.3–14.5)
S PNEUM AG UR QL: NEGATIVE — SIGNIFICANT CHANGE UP
SAO2 % BLDA: 98.9 % — HIGH (ref 94–98)
SODIUM SERPL-SCNC: 139 MMOL/L — SIGNIFICANT CHANGE UP (ref 135–145)
SPECIMEN SOURCE: SIGNIFICANT CHANGE UP
WBC # BLD: 4.11 K/UL — SIGNIFICANT CHANGE UP (ref 3.8–10.5)
WBC # FLD AUTO: 4.11 K/UL — SIGNIFICANT CHANGE UP (ref 3.8–10.5)

## 2024-03-06 PROCEDURE — 99233 SBSQ HOSP IP/OBS HIGH 50: CPT

## 2024-03-06 PROCEDURE — 99233 SBSQ HOSP IP/OBS HIGH 50: CPT | Mod: GC

## 2024-03-06 RX ORDER — SODIUM,POTASSIUM PHOSPHATES 278-250MG
1 POWDER IN PACKET (EA) ORAL ONCE
Refills: 0 | Status: COMPLETED | OUTPATIENT
Start: 2024-03-06 | End: 2024-03-06

## 2024-03-06 RX ORDER — WARFARIN SODIUM 2.5 MG/1
7.5 TABLET ORAL ONCE
Refills: 0 | Status: COMPLETED | OUTPATIENT
Start: 2024-03-06 | End: 2024-03-06

## 2024-03-06 RX ORDER — IPRATROPIUM/ALBUTEROL SULFATE 18-103MCG
3 AEROSOL WITH ADAPTER (GRAM) INHALATION EVERY 12 HOURS
Refills: 0 | Status: DISCONTINUED | OUTPATIENT
Start: 2024-03-06 | End: 2024-03-08

## 2024-03-06 RX ADMIN — Medication 30 MILLIGRAM(S): at 05:58

## 2024-03-06 RX ADMIN — HEPARIN SODIUM 1900 UNIT(S)/HR: 5000 INJECTION INTRAVENOUS; SUBCUTANEOUS at 07:38

## 2024-03-06 RX ADMIN — WARFARIN SODIUM 7.5 MILLIGRAM(S): 2.5 TABLET ORAL at 00:09

## 2024-03-06 RX ADMIN — Medication 3 MILLILITER(S): at 20:32

## 2024-03-06 RX ADMIN — PANTOPRAZOLE SODIUM 40 MILLIGRAM(S): 20 TABLET, DELAYED RELEASE ORAL at 05:58

## 2024-03-06 RX ADMIN — Medication 100 MILLIGRAM(S): at 05:58

## 2024-03-06 RX ADMIN — HEPARIN SODIUM 1900 UNIT(S)/HR: 5000 INJECTION INTRAVENOUS; SUBCUTANEOUS at 07:36

## 2024-03-06 RX ADMIN — Medication 3 MILLILITER(S): at 10:30

## 2024-03-06 RX ADMIN — HEPARIN SODIUM 0 UNIT(S)/HR: 5000 INJECTION INTRAVENOUS; SUBCUTANEOUS at 05:51

## 2024-03-06 RX ADMIN — Medication 40 MILLIGRAM(S): at 05:58

## 2024-03-06 RX ADMIN — HEPARIN SODIUM 1900 UNIT(S)/HR: 5000 INJECTION INTRAVENOUS; SUBCUTANEOUS at 23:15

## 2024-03-06 RX ADMIN — Medication 3 MILLIGRAM(S): at 21:21

## 2024-03-06 RX ADMIN — Medication 1 PACKET(S): at 14:54

## 2024-03-06 RX ADMIN — CARVEDILOL PHOSPHATE 25 MILLIGRAM(S): 80 CAPSULE, EXTENDED RELEASE ORAL at 18:20

## 2024-03-06 RX ADMIN — ATORVASTATIN CALCIUM 10 MILLIGRAM(S): 80 TABLET, FILM COATED ORAL at 21:20

## 2024-03-06 RX ADMIN — Medication 100 MILLIGRAM(S): at 21:21

## 2024-03-06 RX ADMIN — Medication 3 MILLILITER(S): at 02:45

## 2024-03-06 RX ADMIN — Medication 100 MILLIGRAM(S): at 13:36

## 2024-03-06 RX ADMIN — Medication 100 MILLIGRAM(S): at 13:37

## 2024-03-06 RX ADMIN — Medication 40 MILLIGRAM(S): at 13:36

## 2024-03-06 RX ADMIN — WARFARIN SODIUM 7.5 MILLIGRAM(S): 2.5 TABLET ORAL at 21:25

## 2024-03-06 RX ADMIN — HEPARIN SODIUM 1900 UNIT(S)/HR: 5000 INJECTION INTRAVENOUS; SUBCUTANEOUS at 14:53

## 2024-03-06 NOTE — PROVIDER CONTACT NOTE (CRITICAL VALUE NOTIFICATION) - BACKGROUND
73 year old male admitted for heart failure.
73 year old male admitted for heart failure.
Patient currently on Warfarin and Heparin drip continuous

## 2024-03-06 NOTE — PROVIDER CONTACT NOTE (CRITICAL VALUE NOTIFICATION) - ASSESSMENT
Patient is A&Ox4, Patient denies pain, chest pain, shortness of breath, nausea, vomiting or dizziness.
Patient Asymptomatic at this time, no s/s of bleeding.
Patient is A&Ox4, Patient denies pain, chest pain, shortness of breath, nausea, vomiting or dizziness.

## 2024-03-06 NOTE — DISCHARGE NOTE NURSING/CASE MANAGEMENT/SOCIAL WORK - PATIENT PORTAL LINK FT
You can access the FollowMyHealth Patient Portal offered by [facility] by registering at the following website: [email or web address]. By joining [facility]’s FollowMyHealth portal, you will also be able to view your health information using other applications (apps) compatible with our system.

## 2024-03-06 NOTE — PROGRESS NOTE ADULT - SUBJECTIVE AND OBJECTIVE BOX
PULMONARY PROGRESS NOTE    PATIENT INFORMATION:  NAME: JOYCE KNIGHT:  MRN: [id number]    CHIEF COMPLAINT: Patient is a 73y old  Male who presents with a chief complaint of ADHF (05 Mar 2024 16:16)      [x] INITIAL CONSULT, H&P, FAMILY HISTORY and PAST MEDICAL AND SURGICAL HISTORY REVIEWED    OVERNIGHT EVENTS, CHANGES TO HPI, SUBJECTIVE:   - Patient seen and examined at bedside  - No overnight events  - Symptoms stable/improving    ========================REVIEW OF SYSTEMS========================  [x] ROS negative except as per HPI    ========================MEDICATIONS=============================  NEURO    CARDIO  carvedilol 25 milliGRAM(s) Oral every 12 hours  furosemide    Tablet 40 milliGRAM(s) Oral two times a day  hydrALAZINE 100 milliGRAM(s) Oral three times a day    PULM  albuterol/ipratropium for Nebulization 3 milliLiter(s) Nebulizer every 6 hours    FEN/GI  pantoprazole    Tablet 40 milliGRAM(s) Oral before breakfast    HEME/ONC  heparin  Infusion.  Unit(s)/Hr IV Continuous <Continuous>    ANTIMICROBIALS    ENDO  allopurinol 100 milliGRAM(s) Oral daily  atorvastatin 10 milliGRAM(s) Oral at bedtime    OTHER  influenza  Vaccine (HIGH DOSE) 0.7 milliLiter(s) IntraMuscular once      PRN      ========================PHYSICAL EXAM============================    VITALS: Vital Signs Last 24 Hrs  T(C): 37.1 (06 Mar 2024 05:50), Max: 37.4 (05 Mar 2024 20:22)  T(F): 98.8 (06 Mar 2024 05:50), Max: 99.4 (05 Mar 2024 20:22)  HR: 57 (06 Mar 2024 05:50) (57 - 72)  BP: 123/70 (06 Mar 2024 05:50) (111/60 - 126/62)  BP(mean): --  RR: 17 (06 Mar 2024 05:50) (17 - 18)  SpO2: 100% (06 Mar 2024 05:50) (97% - 100%)    Parameters below as of 06 Mar 2024 05:50  Patient On (Oxygen Delivery Method): BiPAP/CPAP        INTAKE and OUTPUT: I&O's Detail    05 Mar 2024 07:01  -  06 Mar 2024 07:00  --------------------------------------------------------  IN:    Heparin Infusion: 138 mL    Oral Fluid: 200 mL  Total IN: 338 mL    OUT:    Voided (mL): 1440 mL  Total OUT: 1440 mL    Total NET: -1102 mL          VENTILATOR SETTINGS: Mode: standby      Physical Exam  CONST:        NAD, Obese,  ENMT:         MMM, no pharyngeal injection or exudates; no LAD  RESP:           Normal effort and expansion. Normal and equal air entry. No WRR.  CHEST:        No tenderness. No cardiac devices, lines, or chest tubes.   CARD:          RRR, normal S1,S2. no MRG.  VASC:          No appreciable JVD;  trace LE edema  ABD:            Soft, nontender, nondistended  MSK:            No clubbing or cyanosis of digits  EXT:             WWP; cap refill <2s; pulses are 2+ B/L  PSYCH:         Mood and affect appropriate  NEURO:       Non-focal; moving all extremities   SKIN:            No visible rashes on exposed skin       ======================LABORATORY RESULTS AND IMAGING=============                        9.9    4.11  )-----------( 127      ( 06 Mar 2024 04:23 )             32.5                                  03-06    139  |  104  |  44<H>  ----------------------------<  93  4.7   |  27  |  1.56<H>    Ca    8.3<L>      06 Mar 2024 04:23  Phos  2.0     03-06  Mg     2.10     03-06      N:4.63/L:0.56= __ 03-01-24 @ 06:17  N:4.11/L:0.59= __ 02-29-24 @ 14:31    Ref-range: <3 normal, >9 elevated, >18 very elevated    Procalcitonin, Serum: 0.07 ng/mL (03-01-24 @ 06:17)        ABG Trend  03-06-24 @ 04:23 [id number]  - 7.37/55/169/32/98.9 Lactate --  03-05-24 @ 12:36 [id number]  - 7.32/64/67/33/93.9 Lactate --  03-01-24 @ 16:14 [id number]  - 7.31/56/105/28/97.8 Lactate --  03-01-24 @ 14:30 [id number]  - 7.28/61/41/29/66.6 Lactate --  03-01-24 @ 08:24 [id number]  - 7.26/64/40/29/68.9 Lactate --    VBG Trend  03-05-24 @ 06:08 FiO2--  - 7.30/68/55/34/89.0 Lactate 1.1  03-04-24 @ 13:30 FiO2--  - 7.23/78/29/33/45.4 Lactate 0.9  03-03-24 @ 06:15 FiO2--  - 7.26/63/62/28/91.5 Lactate 1.1  03-02-24 @ 00:55 FiO2--  - 7.24/70/44/30/71.1 Lactate --  03-01-24 @ 06:17 FiO2--  - 7.22/70/71/29/93.6 Lactate 0.9      Creatinine Trend: 1.56<--, 1.70<--, 1.94<--, 2.04<--, 2.23<--, 1.74<--    [x] RADIOLOGY REVIEWED AND INTERPRETED BY ME    TTE W or WO Ultrasound Enhancing Agent:   TRANSTHORACIC ECHOCARDIOGRAM REPORT  ________________________________________________________________________________                                      _______       Pt. Name:       JOYCE KNIGHT Study Date:    3/4/2024  MRN:            [id number]         YOB: 1950  Accession #:    [id number]         Age:           73 years  Account#:       [id number]          Gender:        M  Heart Rate:                       Height:        70.87 in (180.00 cm)  Rhythm:    Weight:        299.99 lb (136.08 kg)  Blood Pressure: 124/63 mmHg       BSA/BMI:       2.50 m² / 42.00 kg/m²  ________________________________________________________________________________________  Referring Physician:    [id number] Merlin Mathew  Interpreting Physician: Angel Rolle M.D.  Primary Sonographer:    Lee Ann Galeano [facility]  Fellow (Interpreting):  Doretha Unger Md    CPT:                ECHO TTE WITH CON COMP W DOPP - .m;DEFINITY ECHO                      CONTRAST PER ML - .m  Indication(s):      Cardiomyopathy, unspecified - I42.9  Procedure:          Transthoracic echocardiogram with 2-D, M-mode and complete                      spectral and color flow Doppler.  [facility]  Admission Status:  Inpatient  Contrast Injection: Verbal consent was obtained for injection of Ultrasonic                      Enhancing Agent following a discussion of risks and                      benefits.                      Endocardial visualization enhanced with 2 ml of Definity                      Ultrasound enhancing agent (Lot#:[id number] Exp.Date:02/2025                      Discarded Dose:8ml).  UEA Reaction:       Patient had no adverse reaction after injection of                      Ultrasound Enhancing Agent.  Study Information:  Image quality for this study is technically difficult.    _______________________________________________________________________________________     CONCLUSIONS:      1. Technically difficult image quality.   2. The leftventricular cavity is normal in size. Left ventricular wall thickness is mildly increased. Left ventricular systolic function is normal with an ejection fraction visually estimated at 60 to 65%. There are no regional wall motion abnormalities seen. Mild left ventricular hypertrophy. There is increased LV mass and concentric hypertrophy.   3. The right ventricle is not well visualized.   4. Structurally normal mitral valve with normal leaflet excursion. There is calcification of the mitral valve annulus. There is mild mitral regurgitation.    ________________________________________________________________________________________  FINDINGS:     Left Ventricle:  The left ventricular cavity is normal in size. Left ventricular wall thickness is mildly increased. Left ventricular systolic function is normal with an ejection fraction visually estimated at 60 to 65%. There are no regional wall motion abnormalities seen. Mild left ventricular hypertrophy. There is increased LV mass and concentrichypertrophy.     Right Ventricle:  The right ventricle is not well visualized.     Left Atrium:  The left atrium is normal.     Right Atrium:  The right atrium was not well visualized.     Aortic Valve:  The aortic valve appears trileaflet with normal systolic excursion. There is calcification of the aortic valve leaflets. There is no evidence of aortic regurgitation.     Mitral Valve:  Structurally normal mitral valve with normal leaflet excursion. There is calcification of the mitral valve annulus. There is mild mitral regurgitation.     Tricuspid Valve:  Structurally normal tricuspid valve with normal leaflet excursion. There is moderate to severe tricuspid regurgitation. Estimated pulmonary artery systolic pressure is 57 mmHg.     Pulmonic Valve:  Structurally normal pulmonic valve with normal leaflet excursion. There is mild pulmonic regurgitation.     Aorta:  The aortic root at the sinuses of Valsalva is normal in size, measuring 3.30 cm (indexed 1.32 cm/m²). The ascending aorta diameter is normal in size, measuring 3.60 cm (indexed 1.44 cm/m²).     Pericardium:  There is a trace pericardial effusion.     Systemic Veins:  The inferior vena cava is dilated measuring 4.40 cm in diameter, (dilated >2.1cm) with abnormal inspiratory collapse (abnormal <50%) consistent with elevated right atrial pressure (~15, range 10-20mmHg).  ____________________________________________________________________  QUANTITATIVE DATA:  Left Ventricle Measurements: (Indexed to BSA)     IVSd (2D):   1.4 cm  LVPWd (2D):  1.3 cm  LVIDd (2D):  5.2 cm  LVIDs (2D):  3.0 cm  LV Mass:     294 g  117.4 g/m²  Visualized LV EF%: 60 to 65%     MV E Vmax:    1.03 m/s  MV A Vmax:    0.25 m/s  MV E/A:       4.07  e' lateral:   14.44 cm/s  e' medial:    10.07 cm/s  E/e' lateral: 7.14  E/e' medial:  10.24  E/e' Average: 8.41  MV DT:        178 msec    Aorta Measurements: (Normal range) (Indexed to BSA)     Sinuses of Valsalva: 3.30 cm (3.1 - 3.7 cm)  Ao Root              3.6 cm  Ao Asc:              3.8 cm  Ao Asc prox:         3.60 cm       Left Atrium Measurements: (Indexed to BSA)  LA Diam 2D: 5.12 cm    Right Ventricle Measurements: Right Atrial Measurements:     TV Consuelo. S': 9.83 cm/s         RA Vol:       403.00 ml                                RA Vol Index: 161.07 ml/m²       LVOT / RVOT/ Qp/Qs Data: (Indexed to BSA)  LVOT Diameter: 2.07 cm    Mitral Valve Measurements:     MV E Vmax: 1.0 m/s  MV A Vmax: 0.3 m/s  MV E/A:    4.1       Tricuspid Valve Measurements:     TR Vmax:          3.2 m/s  TR Peak Gradient: 42.2 mmHg  RA Pressure:      15 mmHg  PASP:             57 mmHg    ________________________________________________________________________________________  Electronically signed on 3/4/2024 at 4:31:59 PM by Angel Rolle M.D.         *** Final *** (03-04-24 @ 10:33)

## 2024-03-06 NOTE — PROVIDER CONTACT NOTE (CRITICAL VALUE NOTIFICATION) - ACTION/TREATMENT ORDERED:
Nomogram followed, Heparin drip delayed for 60min, will restart at new rate of 19ml/hr.
As per provider Lashonda Weller (#[id number]) no further interventions needed at this time.
As per provider Lashonda Weller (#[id number]) call respiratory to place patient back on BIPAP

## 2024-03-06 NOTE — PROGRESS NOTE ADULT - SUBJECTIVE AND OBJECTIVE BOX
**********************************************  [facility] Division of Hospital Medicine  Mitzy Mahajan MD  Available via MS Teams  Pager: [phone]  **********************************************     Patient is a 73y old  Male who presents with a chief complaint of ADHF (06 Mar 2024 12:08)    SUBJECTIVE / OVERNIGHT EVENTS: No acute events overnight. Patient examined at bedside this AM, with no subjective complaints. Denies CP, palpitations, SOB, n/v/d, abdominal pain.     OBJECTIVE:  Vital Signs Last 24 Hrs  T(C): 37.1 (06 Mar 2024 05:50), Max: 37.4 (05 Mar 2024 20:22)  T(F): 98.8 (06 Mar 2024 05:50), Max: 99.4 (05 Mar 2024 20:22)  HR: 66 (06 Mar 2024 10:30) (57 - 72)  BP: 123/70 (06 Mar 2024 05:50) (113/63 - 126/62)  BP(mean): --  RR: 18 (06 Mar 2024 10:22) (17 - 18)  SpO2: 100% (06 Mar 2024 10:22) (100% - 100%)    Parameters below as of 06 Mar 2024 10:22  Patient On (Oxygen Delivery Method): room air        I&O's Summary    05 Mar 2024 07:01  -  06 Mar 2024 07:00  --------------------------------------------------------  IN: 338 mL / OUT: 1440 mL / NET: -1102 mL    06 Mar 2024 07:01  -  06 Mar 2024 14:38  --------------------------------------------------------  IN: 600 mL / OUT: 2000 mL / NET: -1400 mL      Physical Exam:     General: No acute distress, well-appearing    Eyes: PERRL, EOMI     ENT: MMM, no oropharyngeal lesions or erythema appreciated     Pulm: No increased WOB. CTAB. No wheezing.     CV: RRR. S1&S2+. No M/R/G appreciated.     Abdomen: +BS. Soft, NTND. No organomegaly.     MSK: Nml ROM    Extremities: No peripheral edema or cyanosis.     Neuro: A&Ox3, no focal deficits     Skin: Warm and dry. No visible rash.       Labs:  CAPILLARY BLOOD GLUCOSE                              9.9    4.11  )-----------( 127      ( 06 Mar 2024 04:23 )             32.5     03-06    139  |  104  |  44<H>  ----------------------------<  93  4.7   |  27  |  1.56<H>    Ca    8.3<L>      06 Mar 2024 04:23  Phos  2.0     03-06  Mg     2.10     03-06      PT/INR - ( 06 Mar 2024 04:23 )   PT: 21.0 sec;   INR: 1.89 ratio         PTT - ( 06 Mar 2024 13:45 )  PTT:84.4 sec        Culture - Sputum (collected 06 Mar 2024 06:07)  Source: .Sputum Sputum  Gram Stain (06 Mar 2024 14:36):    Rare polymorphonuclear leukocytes per low power field    Few Squamous epithelial cells per low power field    Few Gram positive cocci in pairs per oil power field    Few Gram Negative Rods per oil power field    Rare Gram Positive Rods per oil power field    Culture - Sputum (collected 05 Mar 2024 17:37)  Source: .Sputum Sputum  Gram Stain (05 Mar 2024 23:41):    Moderate polymorphonuclear leukocytes per low power field    Few Squamous epithelial cells per low power field    Moderate Gram Negative Rods per oil power field    Moderate Gram Positive Cocci in Pairs and Chains per oil power field      Urinalysis Basic - ( 06 Mar 2024 04:23 )    Color: x / Appearance: x / SG: x / pH: x  Gluc: 93 mg/dL / Ketone: x  / Bili: x / Urobili: x   Blood: x / Protein: x / Nitrite: x   Leuk Esterase: x / RBC: x / WBC x   Sq Epi: x / Non Sq Epi: x / Bacteria: x      Imaging Personally Reviewed:      MEDICATIONS  (STANDING):  albuterol/ipratropium for Nebulization 3 milliLiter(s) Nebulizer every 12 hours  allopurinol 100 milliGRAM(s) Oral daily  atorvastatin 10 milliGRAM(s) Oral at bedtime  carvedilol 25 milliGRAM(s) Oral every 12 hours  furosemide    Tablet 40 milliGRAM(s) Oral two times a day  heparin  Infusion.  Unit(s)/Hr (23 mL/Hr) IV Continuous <Continuous>  hydrALAZINE 100 milliGRAM(s) Oral three times a day  influenza  Vaccine (HIGH DOSE) 0.7 milliLiter(s) IntraMuscular once  pantoprazole    Tablet 40 milliGRAM(s) Oral before breakfast  potassium phosphate / sodium phosphate Powder (PHOS-NaK) 1 Packet(s) Oral once  warfarin 7.5 milliGRAM(s) Oral once    MEDICATIONS  (PRN):  acetaminophen     Tablet .. 650 milliGRAM(s) Oral every 6 hours PRN Temp greater or equal to 38C (100.4F), Mild Pain (1 - 3)  heparin   Injectable 03088 Unit(s) IV Push every 6 hours PRN For aPTT less than 40  heparin   Injectable 5000 Unit(s) IV Push every 6 hours PRN For aPTT between 40 - 57  melatonin 3 milliGRAM(s) Oral at bedtime PRN Insomnia

## 2024-03-06 NOTE — PROGRESS NOTE ADULT - SUBJECTIVE AND OBJECTIVE BOX
Date of service 3/6/24    extended hpi: 73-year-old male with history of  HFpEF, CKD,  hypertension, chronic A-fib on warfarin (OP Cardio Dr Johansen), who presents with 1 week of LE edema and 3 weeks of SOB. He was found with acute influenza, Acute hypoxemic and hypercapnic respiratory failure, and acute on chronic HFpEF    SOb resolved, no chest pain    Review of Systems:   Constitutional: [ ] fevers, [ ] chills.   Skin: [ ] dry skin. [ ] rashes.  Psychiatric: [ ] depression, [ ] anxiety.   Gastrointestinal: [ ] BRBPR, [ ] melena.   Neurological: [ ] confusion. [ ] seizures. [ ] shuffling gait.   Ears,Nose,Mouth and Throat: [ ] ear pain [ ] sore throat.   Eyes: [ ] diplopia.   Respiratory: [ ] hemoptysis. [ ] shortness of breath  Cardiovascular: See HPI above  Hematologic/Lymphatic: [ ] anemia. [ ] painful nodes. [ ] prolonged bleeding.   Genitourinary: [ ] hematuria. [ ] flank pain.   Endocrine: [ ] significant change in weight. [ ] intolerance to heat and cold.     Review of systems [x ] otherwise negative, [ ] otherwise unable to obtain    FH: no family history of sudden cardiac death in first degree relatives    SH: [ ] tobacco, [ ] alcohol, [ ] drugs    acetaminophen     Tablet .. 650 milliGRAM(s) Oral every 6 hours PRN  albuterol/ipratropium for Nebulization 3 milliLiter(s) Nebulizer every 12 hours  allopurinol 100 milliGRAM(s) Oral daily  atorvastatin 10 milliGRAM(s) Oral at bedtime  carvedilol 25 milliGRAM(s) Oral every 12 hours  furosemide    Tablet 40 milliGRAM(s) Oral two times a day  heparin   Injectable 96625 Unit(s) IV Push every 6 hours PRN  heparin   Injectable 5000 Unit(s) IV Push every 6 hours PRN  heparin  Infusion.  Unit(s)/Hr IV Continuous <Continuous>  hydrALAZINE 100 milliGRAM(s) Oral three times a day  influenza  Vaccine (HIGH DOSE) 0.7 milliLiter(s) IntraMuscular once  melatonin 3 milliGRAM(s) Oral at bedtime PRN  pantoprazole    Tablet 40 milliGRAM(s) Oral before breakfast  potassium phosphate / sodium phosphate Powder (PHOS-NaK) 1 Packet(s) Oral once  warfarin 7.5 milliGRAM(s) Oral once                            9.9    4.11  )-----------( 127      ( 06 Mar 2024 04:23 )             32.5       03-06    139  |  104  |  44<H>  ----------------------------<  93  4.7   |  27  |  1.56<H>    Ca    8.3<L>      06 Mar 2024 04:23  Phos  2.0     03-06  Mg     2.10     03-06      T(C): 37.1 (03-06-24 @ 05:50), Max: 37.4 (03-05-24 @ 20:22)  HR: 66 (03-06-24 @ 10:30) (57 - 72)  BP: 123/70 (03-06-24 @ 05:50) (111/60 - 126/62)  RR: 18 (03-06-24 @ 10:22) (17 - 18)  SpO2: 100% (03-06-24 @ 10:22) (97% - 100%)  Wt(kg): --    I&O's Summary    05 Mar 2024 07:01  -  06 Mar 2024 07:00  --------------------------------------------------------  IN: 338 mL / OUT: 1440 mL / NET: -1102 mL    Gen: Appears well in NAD  HEENT:  (-)icterus (-)pallor  CV: N S1 S2 1/6 BRANDON (+)2 Pulses B/l  Resp:  Clear to ausculatation B/L, normal effort  GI: (+) BS Soft, NT, ND  Lymph:  (2+)LE Edema, (-)obvious lymphadenopathy  Skin: Warm to touch, Normal turgor  Psych: Appropriate mood and affect      TELEMETRY: 	 Afib     ECG:  	AFib    < from: Xray Chest 2 Views PA/Lat (02.29.24 @ 15:08) >    IMPRESSION:    Mildly prominent perihilar interstitial markings.    Cardiomegaly.    < end of copied text >    < from: TTE with Doppler (w/Cont) (03.17.22 @ 11:13) >  CONCLUSIONS:  1. Mitral annular calcification, otherwise normal mitral  valve. Mild mitral regurgitation.  2. Severely dilated left atrium.  LA volume index = 69  cc/m2.  3. Mild concentric left ventricular hypertrophy.  4. Endocardium not well visualized; grossly normal left  ventricular systolic function.  Endocardial visualization  enhanced with intravenous injection of echo contrast  (Definity).  5. Severe right atrial enlargement.  6. The right ventricle is not well visualized; grossly  normal right ventricular systolic function.  7. Estimated right ventricular systolic pressure equals 60  mm Hg, assuming right atrial pressure equals 10 mm Hg,  consistent with moderate pulmonary hypertension.  ------------------------------------------------------------------------  Confirmed on  3/17/2022 - 12:50:26 by Angel Rolle M.D.,  [facility], FASE    < end of copied text >    < from: TTE W or WO Ultrasound Enhancing Agent (03.04.24 @ 10:33) >  CONCLUSIONS:      1. Technically difficult image quality.   2. The leftventricular cavity is normal in size. Left ventricular wall thickness is mildly increased. Left ventricular systolic function is normal with an ejection fraction visually estimated at 60 to 65%. There are no regional wall motion abnormalities seen. Mild left ventricular hypertrophy. There is increased LV mass and concentric hypertrophy.   3. The right ventricle is not well visualized.   4. Structurally normal mitral valve with normal leaflet excursion. There is calcification of the mitral valve annulus. There is mild mitral regurgitation.    < end of copied text >      ASSESSMENT/PLAN: 	73-year-old male with history of  HFpEF, CKD,  hypertension, chronic A-fib on warfarin (OP Cardio Dr Crowley at [facility]), who presents with 1 week of LE edema and 3 weeks of SOB. He was found with acute influenza, Acute hypoxemic and hypercapnic respiratory failure, and acute on chronic HFpEF    #SOB  --multifactorial  --s/p Tamiflu, s/p IV Lasix, and was on Bipap  --improved   --renal function stable      #HFpEF  --s/p IV diuresis, now compensated, now continue home oral diuretic regimen (Lasix 40 PO BID)  --cont Coreg and Hydralazine for HTN and afterload reduction  --TTE with Normal LV sys function, mild LVH      #chronic Afib  --rates well controlled on Coreg  --cont lifelong AC   --on Coumadin, Goal INR 2-3  --?able to switch to NOAC      F/U with OP Cardiologist Dr ARIADNA Johansen

## 2024-03-06 NOTE — PROGRESS NOTE ADULT - ATTENDING COMMENTS
Patient is a 72 yo M w/ HFpEF, CKD, HTN, Afib on warfarin who is admitted with acute hypercapnic respiratory failure in the setting of Flu AH1 and ADHF     #Acute hypercapnic respiratory failure - Slightly improved  #Flu  #ADHF  - Complete course of Tamiflu  - Currently on AVAPS settings , IPAP 12-25, EPAP 10, 40%. Can monitor off AVAPS tonight and check AM ABG as patient clinically improving  - Please obtain AM ABG  - Diuresis as per cardiology and primary team to maintain euvolemia  - Also may have underlying MAREN, would benefit from outpatient PSG  - Please provide and encourage incentive spirometer and ambulaiton/OOB to chair as tolerated    Alejandro Lopez MD  Pulmonary & Critical Care

## 2024-03-07 LAB
ANION GAP SERPL CALC-SCNC: 9 MMOL/L — SIGNIFICANT CHANGE UP (ref 7–14)
APTT BLD: 129 SEC — CRITICAL HIGH (ref 24.5–35.6)
BUN SERPL-MCNC: 40 MG/DL — HIGH (ref 7–23)
CALCIUM SERPL-MCNC: 8.2 MG/DL — LOW (ref 8.4–10.5)
CHLORIDE SERPL-SCNC: 102 MMOL/L — SIGNIFICANT CHANGE UP (ref 98–107)
CO2 SERPL-SCNC: 29 MMOL/L — SIGNIFICANT CHANGE UP (ref 22–31)
CREAT SERPL-MCNC: 1.44 MG/DL — HIGH (ref 0.5–1.3)
CULTURE RESULTS: ABNORMAL
CULTURE RESULTS: ABNORMAL
EGFR: 51 ML/MIN/1.73M2 — LOW
GLUCOSE BLDC GLUCOMTR-MCNC: 101 MG/DL — HIGH (ref 70–99)
GLUCOSE SERPL-MCNC: 97 MG/DL — SIGNIFICANT CHANGE UP (ref 70–99)
HCT VFR BLD CALC: 31.7 % — LOW (ref 39–50)
HGB BLD-MCNC: 10 G/DL — LOW (ref 13–17)
INR BLD: 1.66 RATIO — HIGH (ref 0.85–1.18)
MAGNESIUM SERPL-MCNC: 1.8 MG/DL — SIGNIFICANT CHANGE UP (ref 1.6–2.6)
MCHC RBC-ENTMCNC: 26.2 PG — LOW (ref 27–34)
MCHC RBC-ENTMCNC: 31.5 GM/DL — LOW (ref 32–36)
MCV RBC AUTO: 83 FL — SIGNIFICANT CHANGE UP (ref 80–100)
NRBC # BLD: 0 /100 WBCS — SIGNIFICANT CHANGE UP (ref 0–0)
NRBC # FLD: 0 K/UL — SIGNIFICANT CHANGE UP (ref 0–0)
PHOSPHATE SERPL-MCNC: 1.8 MG/DL — LOW (ref 2.5–4.5)
PLATELET # BLD AUTO: 139 K/UL — LOW (ref 150–400)
POTASSIUM SERPL-MCNC: 4.4 MMOL/L — SIGNIFICANT CHANGE UP (ref 3.5–5.3)
POTASSIUM SERPL-SCNC: 4.4 MMOL/L — SIGNIFICANT CHANGE UP (ref 3.5–5.3)
PROTHROM AB SERPL-ACNC: 18.3 SEC — HIGH (ref 9.5–13)
RBC # BLD: 3.82 M/UL — LOW (ref 4.2–5.8)
RBC # FLD: 17.1 % — HIGH (ref 10.3–14.5)
SODIUM SERPL-SCNC: 140 MMOL/L — SIGNIFICANT CHANGE UP (ref 135–145)
SPECIMEN SOURCE: SIGNIFICANT CHANGE UP
SPECIMEN SOURCE: SIGNIFICANT CHANGE UP
WBC # BLD: 4.42 K/UL — SIGNIFICANT CHANGE UP (ref 3.8–10.5)
WBC # FLD AUTO: 4.42 K/UL — SIGNIFICANT CHANGE UP (ref 3.8–10.5)

## 2024-03-07 PROCEDURE — 99233 SBSQ HOSP IP/OBS HIGH 50: CPT

## 2024-03-07 RX ORDER — WARFARIN SODIUM 2.5 MG/1
8.5 TABLET ORAL ONCE
Refills: 0 | Status: COMPLETED | OUTPATIENT
Start: 2024-03-07 | End: 2024-03-07

## 2024-03-07 RX ORDER — SPIRONOLACTONE 25 MG/1
12.5 TABLET, FILM COATED ORAL DAILY
Refills: 0 | Status: DISCONTINUED | OUTPATIENT
Start: 2024-03-07 | End: 2024-03-08

## 2024-03-07 RX ORDER — APIXABAN 2.5 MG/1
1 TABLET, FILM COATED ORAL
Qty: 60 | Refills: 0
Start: 2024-03-07 | End: 2024-04-05

## 2024-03-07 RX ORDER — POTASSIUM PHOSPHATE, MONOBASIC POTASSIUM PHOSPHATE, DIBASIC 236; 224 MG/ML; MG/ML
15 INJECTION, SOLUTION INTRAVENOUS ONCE
Refills: 0 | Status: COMPLETED | OUTPATIENT
Start: 2024-03-07 | End: 2024-03-07

## 2024-03-07 RX ADMIN — HEPARIN SODIUM 1500 UNIT(S)/HR: 5000 INJECTION INTRAVENOUS; SUBCUTANEOUS at 06:35

## 2024-03-07 RX ADMIN — Medication 40 MILLIGRAM(S): at 05:43

## 2024-03-07 RX ADMIN — Medication 100 MILLIGRAM(S): at 12:04

## 2024-03-07 RX ADMIN — Medication 100 MILLIGRAM(S): at 05:41

## 2024-03-07 RX ADMIN — Medication 3 MILLILITER(S): at 07:04

## 2024-03-07 RX ADMIN — POTASSIUM PHOSPHATE, MONOBASIC POTASSIUM PHOSPHATE, DIBASIC 62.5 MILLIMOLE(S): 236; 224 INJECTION, SOLUTION INTRAVENOUS at 12:03

## 2024-03-07 RX ADMIN — HEPARIN SODIUM 0 UNIT(S)/HR: 5000 INJECTION INTRAVENOUS; SUBCUTANEOUS at 05:27

## 2024-03-07 RX ADMIN — ATORVASTATIN CALCIUM 10 MILLIGRAM(S): 80 TABLET, FILM COATED ORAL at 22:08

## 2024-03-07 RX ADMIN — HEPARIN SODIUM 1500 UNIT(S)/HR: 5000 INJECTION INTRAVENOUS; SUBCUTANEOUS at 07:34

## 2024-03-07 RX ADMIN — PANTOPRAZOLE SODIUM 40 MILLIGRAM(S): 20 TABLET, DELAYED RELEASE ORAL at 05:45

## 2024-03-07 RX ADMIN — WARFARIN SODIUM 8.5 MILLIGRAM(S): 2.5 TABLET ORAL at 22:08

## 2024-03-07 RX ADMIN — CARVEDILOL PHOSPHATE 25 MILLIGRAM(S): 80 CAPSULE, EXTENDED RELEASE ORAL at 05:42

## 2024-03-07 RX ADMIN — Medication 100 MILLIGRAM(S): at 12:05

## 2024-03-07 RX ADMIN — Medication 40 MILLIGRAM(S): at 12:14

## 2024-03-07 RX ADMIN — Medication 100 MILLIGRAM(S): at 22:08

## 2024-03-07 RX ADMIN — CARVEDILOL PHOSPHATE 25 MILLIGRAM(S): 80 CAPSULE, EXTENDED RELEASE ORAL at 18:24

## 2024-03-07 NOTE — PROGRESS NOTE ADULT - PROBLEM SELECTOR PLAN 2
- pt p/w inc BL LE edema, likely in setting of CHF exacerbation, as above  - LE dopplers neg  - coumadin as below
- pt p/w inc BL LE edema, likely in setting of CHF exacerbation, as above  - LE dopplers neg  - coumadin as below
Pt p/w SOB, SAENZ, orthopnea, cough. Flu positive. Acute on chronic CHF.   - BNP 2938  - CXR with pulmonary vascular congestion  - S/p lasix 40mg IV BID --> transitioned to po lasix 40mg BID - Called patient's outpatient cardiologist's office and clarified home diuretic regimen. Actually on Torsemide 20mg qd and spironolactone 12.5mg qday - resumed.   - cards consulted, recs appreciated  - TTE wnl  - monitor I&O and daily weights  - monitor on tele
- pt p/w inc BL LE edema, likely in setting of CHF exacerbation, as above  - LE dopplers neg  - coumadin as below
Pt p/w SOB, SAENZ, orthopnea, cough. Flu positive. Acute on chronic CHF.   - BNP 2938  - CXR with pulmonary vascular congestion  - S/p lasix 40mg IV BID --> transitioned to po lasix 40mg BID - Called patient's outpatient cardiologist's office today to discuss home diuretic regimen (awaiting callback)   - cards consulted, recs appreciated  - TTE wnl  - monitor I&O and daily weights  - monitor on tele
- pt p/w inc BL LE edema, likely in setting of CHF exacerbation, as above  - check LE dopplers   - coumadin as below
Pt p/w SOB, SAENZ, orthopnea, cough. Flu positive. Acute on chronic CHF.   - BNP 2938  - CXR with pulmonary vascular congestion  - S/p lasix 40mg IV BID --> transitioned to po lasix 40mg BID (home dose)  - cards consulted, recs appreciated  - TTE wnl  - monitor I&O and daily weights  - monitor on tele

## 2024-03-07 NOTE — PROGRESS NOTE ADULT - PROBLEM SELECTOR PLAN 10
DVT ppx: Coumadin bridged with heparin gtt   Diet: DASH/CC   Dispo: home with home PT pending medical optimization
DVT ppx: Coumadin   Diet: DASH/CC   Dispo: home with home PT pending medical optimization
DVT ppx: Coumadin bridged with heparin gtt   Diet: DASH/CC   Dispo: home with home PT pending medical optimization

## 2024-03-07 NOTE — PROGRESS NOTE ADULT - PROBLEM SELECTOR PROBLEM 5
Chronic atrial fibrillation
Chronic atrial fibrillation
Leg swelling
Chronic atrial fibrillation
Chronic atrial fibrillation
Leg swelling
Leg swelling

## 2024-03-07 NOTE — PROGRESS NOTE ADULT - PROBLEM SELECTOR PROBLEM 1
Acute decompensated heart failure
Acute respiratory failure with hypoxia and hypercapnia
Acute decompensated heart failure
Acute respiratory failure with hypoxia and hypercapnia
Acute decompensated heart failure
Acute respiratory failure with hypoxia and hypercapnia
Acute decompensated heart failure

## 2024-03-07 NOTE — PROGRESS NOTE ADULT - SUBJECTIVE AND OBJECTIVE BOX
Date of service 3/7/24    extended hpi: 73-year-old male with history of  HFpEF, CKD,  hypertension, chronic A-fib on warfarin (OP Cardio Dr Johansen), who presents with 1 week of LE edema and 3 weeks of SOB. He was found with acute influenza, Acute hypoxemic and hypercapnic respiratory failure, and acute on chronic HFpEF    SOb resolved, no chest pain    Review of Systems:   Constitutional: [ ] fevers, [ ] chills.   Skin: [ ] dry skin. [ ] rashes.  Psychiatric: [ ] depression, [ ] anxiety.   Gastrointestinal: [ ] BRBPR, [ ] melena.   Neurological: [ ] confusion. [ ] seizures. [ ] shuffling gait.   Ears,Nose,Mouth and Throat: [ ] ear pain [ ] sore throat.   Eyes: [ ] diplopia.   Respiratory: [ ] hemoptysis. [ ] shortness of breath  Cardiovascular: See HPI above  Hematologic/Lymphatic: [ ] anemia. [ ] painful nodes. [ ] prolonged bleeding.   Genitourinary: [ ] hematuria. [ ] flank pain.   Endocrine: [ ] significant change in weight. [ ] intolerance to heat and cold.     Review of systems [ x] otherwise negative, [ ] otherwise unable to obtain    FH: no family history of sudden cardiac death in first degree relatives    SH: [ ] tobacco, [ ] alcohol, [ ] drugs    acetaminophen     Tablet .. 650 milliGRAM(s) Oral every 6 hours PRN  albuterol/ipratropium for Nebulization 3 milliLiter(s) Nebulizer every 12 hours  allopurinol 100 milliGRAM(s) Oral daily  atorvastatin 10 milliGRAM(s) Oral at bedtime  carvedilol 25 milliGRAM(s) Oral every 12 hours  furosemide    Tablet 40 milliGRAM(s) Oral two times a day  hydrALAZINE 100 milliGRAM(s) Oral three times a day  influenza  Vaccine (HIGH DOSE) 0.7 milliLiter(s) IntraMuscular once  melatonin 3 milliGRAM(s) Oral at bedtime PRN  pantoprazole    Tablet 40 milliGRAM(s) Oral before breakfast  warfarin 8.5 milliGRAM(s) Oral once                            10.0   4.42  )-----------( 139      ( 07 Mar 2024 04:05 )             31.7       03-07    140  |  102  |  40<H>  ----------------------------<  97  4.4   |  29  |  1.44<H>    Ca    8.2<L>      07 Mar 2024 04:05  Phos  1.8     03-07  Mg     1.80     03-07      T(C): 36.9 (03-07-24 @ 04:00), Max: 37.1 (03-06-24 @ 13:35)  HR: 61 (03-07-24 @ 07:41) (61 - 76)  BP: 128/64 (03-07-24 @ 04:00) (124/63 - 131/60)  RR: 17 (03-07-24 @ 04:00) (17 - 18)  SpO2: 100% (03-07-24 @ 07:41) (96% - 100%)  Wt(kg): --    I&O's Summary    06 Mar 2024 07:01  -  07 Mar 2024 07:00  --------------------------------------------------------  IN: 1000 mL / OUT: 3500 mL / NET: -2500 mL      Gen: Appears well in NAD  HEENT:  (-)icterus (-)pallor  CV: N S1 S2 1/6 BRANDON (+)2 Pulses B/l  Resp:  Clear to ausculatation B/L, normal effort  GI: (+) BS Soft, NT, ND  Lymph:  (2+)LE Edema, (-)obvious lymphadenopathy  Skin: Warm to touch, Normal turgor  Psych: Appropriate mood and affect      TELEMETRY: 	 Afib     ECG:  	AFib    < from: Xray Chest 2 Views PA/Lat (02.29.24 @ 15:08) >    IMPRESSION:    Mildly prominent perihilar interstitial markings.    Cardiomegaly.    < end of copied text >    < from: TTE with Doppler (w/Cont) (03.17.22 @ 11:13) >  CONCLUSIONS:  1. Mitral annular calcification, otherwise normal mitral  valve. Mild mitral regurgitation.  2. Severely dilated left atrium.  LA volume index = 69  cc/m2.  3. Mild concentric left ventricular hypertrophy.  4. Endocardium not well visualized; grossly normal left  ventricular systolic function.  Endocardial visualization  enhanced with intravenous injection of echo contrast  (Definity).  5. Severe right atrial enlargement.  6. The right ventricle is not well visualized; grossly  normal right ventricular systolic function.  7. Estimated right ventricular systolic pressure equals 60  mm Hg, assuming right atrial pressure equals 10 mm Hg,  consistent with moderate pulmonary hypertension.  ------------------------------------------------------------------------  Confirmed on  3/17/2022 - 12:50:26 by Angel Rolle M.D.,  [facility], FASE    < end of copied text >    < from: TTE W or WO Ultrasound Enhancing Agent (03.04.24 @ 10:33) >  CONCLUSIONS:      1. Technically difficult image quality.   2. The leftventricular cavity is normal in size. Left ventricular wall thickness is mildly increased. Left ventricular systolic function is normal with an ejection fraction visually estimated at 60 to 65%. There are no regional wall motion abnormalities seen. Mild left ventricular hypertrophy. There is increased LV mass and concentric hypertrophy.   3. The right ventricle is not well visualized.   4. Structurally normal mitral valve with normal leaflet excursion. There is calcification of the mitral valve annulus. There is mild mitral regurgitation.    < end of copied text >      ASSESSMENT/PLAN: 	73-year-old male with history of  HFpEF, CKD,  hypertension, chronic A-fib on warfarin (OP Cardio Dr Crowley at [facility]), who presents with 1 week of LE edema and 3 weeks of SOB. He was found with acute influenza, Acute hypoxemic and hypercapnic respiratory failure, and acute on chronic HFpEF    #SOB  --multifactorial  --s/p Tamiflu, s/p IV Lasix, and was on Bipap  --improved   --renal function stable      #HFpEF  --s/p IV diuresis, now compensated, now continue home oral diuretic regimen (Lasix 40 PO BID)  --cont Coreg and Hydralazine for HTN and afterload reduction  --TTE with Normal LV sys function, mild LVH      #chronic Afib  --rates well controlled on Coreg  --cont lifelong AC   --on Coumadin, Goal INR 2-3  --?able to switch to NOAC      F/U with OP Cardiologist Dr ARIADNA Johansen

## 2024-03-07 NOTE — PROVIDER CONTACT NOTE (CRITICAL VALUE NOTIFICATION) - RECOMMENDATIONS
As per provider Lashonda Weller (#[id number]) no further interventions needed at this time.
As per provider , follow Nomogram
Follow Heparin titration protocol
As per provider Lashonda Weller (#[id number]) call respiratory to place patient back on BIPAP

## 2024-03-07 NOTE — PROGRESS NOTE ADULT - NS ATTEND AMEND GEN_ALL_CORE FT
Patient seen and examined. Agree with plan as detailed in PA/NP Note.     C/w Diuresis  F/u TTE    Guanako Marte MD  Pager: [phone]
Patient seen and examined. Agree with plan as detailed in PA/NP Note. \    C/w Oral diuresis  Primary team discussing with utpatient cardiologist if can use DOAC, no contraindication on current TTE    Guanako Marte MD  Pager: [phone]
Patient seen and examined. Agree with plan as detailed in PA/NP Note.     Euvolemic on exam, can transition to oral diuretics    Guanako Marte MD  Pager: [phone]
Patient seen and examined. Agree with plan as detailed in PA/NP Note.     Maintain oral diuresis  c/w Coumadin for goal INR 2-3    Guanako Marte MD  Pager: [phone]

## 2024-03-07 NOTE — PROVIDER CONTACT NOTE (CRITICAL VALUE NOTIFICATION) - SITUATION
Critical APTT 129.0 called in by lab  ACP Rochelle made aware
Blood gas PO2 41
Blood gas PO2 40
Laboratory notified unit of Patient critical lab value of .3

## 2024-03-07 NOTE — PROGRESS NOTE ADULT - PROBLEM SELECTOR PLAN 5
- home med: coumadin 6.5mg M/T/W, 7.5mg R/F/S/S  - INR 4.54 today  - f/u AM INR  - hold coumadin until INR <3
- pt p/w inc BL LE edema, likely in setting of CHF exacerbation, as above  - LE dopplers neg  - coumadin as below
- home med: coumadin 6.5mg M/T/W, 7.5mg R/F/S/S  - INR 4.54 3/1  - declined labs for coags today --> will obtain tomorrow  - hold coumadin until INR <3
- home med: coumadin 6.5mg M/T/W, 7.5mg R/F/S/S  - INR 4.54 3/1 --> 3.34 today  - hold coumadin until INR <3
- home med: coumadin 6.5mg M/T/W, 7.5mg R/F/S/S  - INR elevated on arrival, coumadin held.   - INR now therapeutic, resume coumadin  - Dose coumadin daily

## 2024-03-07 NOTE — PROGRESS NOTE ADULT - PROBLEM SELECTOR PLAN 6
- pending med rec
- pending med rec
- C/w home meds
- Cr 1.67 -- 1.74 -- 2.23 ----1.94  - CXR improved  - continue to trend  - avoid nephrotoxic agents and renally dose medications  -NOW RESOLVED
- Cr 1.67 -- 1.74 -- 2.23 ----1.94  - CXR improved  - continue to trend  - avoid nephrotoxic agents and renally dose medications  -NOW RESOLVED
- pending med rec
- Cr 1.67 -- 1.74 -- 2.23 ----1.94  -Diuresis held since 3/2, will given 1 dose of IV lasix 40mg today and reassess tomorrow   - CXR improved  - continue to trend  - avoid nephrotoxic agents and renally dose medications

## 2024-03-07 NOTE — PROGRESS NOTE ADULT - PROBLEM SELECTOR PROBLEM 8
Type 2 diabetes mellitus
Type 2 diabetes mellitus
HLD (hyperlipidemia)
Type 2 diabetes mellitus
Type 2 diabetes mellitus
HLD (hyperlipidemia)
HLD (hyperlipidemia)

## 2024-03-07 NOTE — PROGRESS NOTE ADULT - PROBLEM SELECTOR PROBLEM 7
HLD (hyperlipidemia)
HTN (hypertension)
HLD (hyperlipidemia)
HTN (hypertension)
HLD (hyperlipidemia)
HLD (hyperlipidemia)
HTN (hypertension)

## 2024-03-07 NOTE — PROGRESS NOTE ADULT - PROBLEM SELECTOR PROBLEM 9
Nutrition, metabolism, and development symptoms
Nutrition, metabolism, and development symptoms
Type 2 diabetes mellitus
Nutrition, metabolism, and development symptoms
Nutrition, metabolism, and development symptoms
Type 2 diabetes mellitus
Type 2 diabetes mellitus

## 2024-03-07 NOTE — PROGRESS NOTE ADULT - SUBJECTIVE AND OBJECTIVE BOX
**********************************************  [facility] Division of Hospital Medicine  Mitzy Mahajan MD  Available via MS Teams  Pager: [phone]  **********************************************     Patient is a 73y old  Male who presents with a chief complaint of ADHF (07 Mar 2024 12:06)    SUBJECTIVE / OVERNIGHT EVENTS: No acute events overnight. Patient examined at bedside this AM, with no subjective complaints. Denies CP, palpitations, SOB, n/v/d, abdominal pain.     OBJECTIVE:  Vital Signs Last 24 Hrs  T(C): 36.8 (07 Mar 2024 12:38), Max: 37.1 (06 Mar 2024 18:20)  T(F): 98.3 (07 Mar 2024 12:38), Max: 98.7 (06 Mar 2024 18:20)  HR: 63 (07 Mar 2024 12:38) (61 - 70)  BP: 131/74 (07 Mar 2024 12:38) (128/64 - 131/74)  BP(mean): --  RR: 18 (07 Mar 2024 12:38) (17 - 18)  SpO2: 98% (07 Mar 2024 12:38) (98% - 100%)    Parameters below as of 07 Mar 2024 07:41  Patient On (Oxygen Delivery Method): room air        I&O's Summary    06 Mar 2024 07:01  -  07 Mar 2024 07:00  --------------------------------------------------------  IN: 1000 mL / OUT: 3500 mL / NET: -2500 mL      Physical Exam:     General: No acute distress, well-appearing    Eyes: PERRL, EOMI     ENT: MMM, no oropharyngeal lesions or erythema appreciated     Pulm: No increased WOB. CTAB. No wheezing.     CV: RRR. S1&S2+. No M/R/G appreciated.     Abdomen: +BS. Soft, NTND. No organomegaly.     MSK: Nml ROM    Extremities: No peripheral edema or cyanosis.     Neuro: A&Ox3, no focal deficits     Skin: Warm and dry. No visible rash.       Labs:  CAPILLARY BLOOD GLUCOSE      POCT Blood Glucose.: 101 mg/dL (07 Mar 2024 05:51)                          10.0   4.42  )-----------( 139      ( 07 Mar 2024 04:05 )             31.7     03-07    140  |  102  |  40<H>  ----------------------------<  97  4.4   |  29  |  1.44<H>    Ca    8.2<L>      07 Mar 2024 04:05  Phos  1.8     03-07  Mg     1.80     03-07      PT/INR - ( 07 Mar 2024 04:05 )   PT: 18.3 sec;   INR: 1.66 ratio         PTT - ( 07 Mar 2024 04:05 )  PTT:129.0 sec        Culture - Sputum (collected 06 Mar 2024 06:07)  Source: .Sputum Sputum  Gram Stain (06 Mar 2024 14:36):    Rare polymorphonuclear leukocytes per low power field    Few Squamous epithelial cells per low power field    Few Gram positive cocci in pairs per oil power field    Few Gram Negative Rods per oil power field    Rare Gram Positive Rods per oil power field  Preliminary Report (07 Mar 2024 06:44):    Normal Respiratory Colette present    Culture - Sputum (collected 05 Mar 2024 17:37)  Source: .Sputum Sputum  Gram Stain (05 Mar 2024 23:41):    Moderate polymorphonuclear leukocytes per low power field    Few Squamous epithelial cells per low power field    Moderate Gram Negative Rods per oil power field    Moderate Gram Positive Cocci in Pairs and Chains per oil power field  Final Report (07 Mar 2024 12:39):    Few Haemophilus parahaemolyticus group "Susceptibilities not performed"    Normal Respiratory Colette present      Urinalysis Basic - ( 07 Mar 2024 04:05 )    Color: x / Appearance: x / SG: x / pH: x  Gluc: 97 mg/dL / Ketone: x  / Bili: x / Urobili: x   Blood: x / Protein: x / Nitrite: x   Leuk Esterase: x / RBC: x / WBC x   Sq Epi: x / Non Sq Epi: x / Bacteria: x      Imaging Personally Reviewed:      MEDICATIONS  (STANDING):  albuterol/ipratropium for Nebulization 3 milliLiter(s) Nebulizer every 12 hours  allopurinol 100 milliGRAM(s) Oral daily  atorvastatin 10 milliGRAM(s) Oral at bedtime  carvedilol 25 milliGRAM(s) Oral every 12 hours  hydrALAZINE 100 milliGRAM(s) Oral three times a day  influenza  Vaccine (HIGH DOSE) 0.7 milliLiter(s) IntraMuscular once  pantoprazole    Tablet 40 milliGRAM(s) Oral before breakfast  spironolactone 12.5 milliGRAM(s) Oral daily  torsemide 20 milliGRAM(s) Oral daily  warfarin 8.5 milliGRAM(s) Oral once    MEDICATIONS  (PRN):  acetaminophen     Tablet .. 650 milliGRAM(s) Oral every 6 hours PRN Temp greater or equal to 38C (100.4F), Mild Pain (1 - 3)  melatonin 3 milliGRAM(s) Oral at bedtime PRN Insomnia

## 2024-03-07 NOTE — PROGRESS NOTE ADULT - PROBLEM SELECTOR PROBLEM 3
Chronic atrial fibrillation
Influenza A
Chronic atrial fibrillation
Influenza A
Chronic atrial fibrillation

## 2024-03-07 NOTE — PROGRESS NOTE ADULT - PROBLEM SELECTOR PLAN 7
- lipid profile pending
- check lipid profile
- lipid profile pending
- lipid profile pending
- C/w home meds

## 2024-03-07 NOTE — PROGRESS NOTE ADULT - PROBLEM SELECTOR PLAN 4
- Cr 1.67 -- 1.74 -- 2.23  - FeUrea today 8.5% c/w pre-renal disease  - hold lasix for now  - f/u CXR   - continue to trend  - avoid nephrotoxic agents and renally dose medications
- on arrival, meeting SIRS criteria with likely pulmonary etiology  - RVP+ flu A  - s/p tamiflu  - supportive care   - CXR neg consolidation
- on arrival, meeting SIRS criteria with likely pulmonary etiology  - RVP+ flu A  - c/w tamiflu  - supportive care   - CXR neg consolidation
- Cr 1.67 -- 1.74 -- 2.23  - FeUrea today 8.5% c/w pre-renal disease  - hold lasix for now  - CXR improved  - continue to trend  - avoid nephrotoxic agents and renally dose medications
- Cr 1.67 -- 1.74   - continue to trend  - avoid nephrotoxic agents and renally dose medications
- on arrival, meeting SIRS criteria with likely pulmonary etiology  - RVP+ flu A  - c/w tamiflu  - supportive care   - CXR neg consolidation
- Cr 1.67 -- 1.74 -- 2.23 ----1.94  -Diuresis held since 3/2, will given 1 dose of IV lasix 40mg today and reassess tomorrow   - CXR improved  - continue to trend  - avoid nephrotoxic agents and renally dose medications

## 2024-03-07 NOTE — PROGRESS NOTE ADULT - PROBLEM SELECTOR PROBLEM 2
Acute decompensated heart failure
Leg swelling
Acute decompensated heart failure
Acute decompensated heart failure
Leg swelling

## 2024-03-07 NOTE — PROGRESS NOTE ADULT - PROBLEM SELECTOR PLAN 3
- home med: coumadin 6.5mg M/T/W, 7.5mg R/F/S/S  - INR elevated on arrival, coumadin held.   - INR now subtherapeutic - coumadin resumed, bridging with heparin gtt due to patient reporting h/o ?valvular afib. No valvular disease appreciated on TTE. Called patient's outpatient cardiologist to discuss, awaiting callback.   - Dose coumadin daily
- home med: coumadin 6.5mg M/T/W, 7.5mg R/F/S/S  - INR elevated on arrival, coumadin held.   - INR now subtherapeutic - coumadin resumed, bridging with heparin gtt   - Dose coumadin daily
- home med: coumadin 6.5mg M/T/W, 7.5mg R/F/S/S  - INR elevated on arrival, coumadin held.   - INR now subtherapeutic - Dose coumadin daily  - Called patient's outpatient Cardiologist Dr. Crowley's office. Spoke with his NP to clarify why patient was on Coumadin. No valvular disease, on Coumadin due to cost of DOAC.   - Attempted to send DOAC to pharmacy, still too expensive for patient
- on arrival, meeting SIRS criteria with likely pulmonary etiology  - RVP+ flu A  - c/w tamiflu  - supportive care   - CXR neg consolidation
- on arrival, meeting SIRS criteria with likely pulmonary etiology  - RVP+ flu A  - started on tamiflu  - supportive care   - CXR neg consolidation

## 2024-03-07 NOTE — PROGRESS NOTE ADULT - PROBLEM SELECTOR PLAN 9
- A1C 6  - mISS  - carb consistent diet
- A1C 6  - mISS  - carb consistent diet
- F: none  - E: replete K<4, Mg<2  - N: DASH/TLC, carb consistent diet  - D: coumadin   - G: none    code: full  dispo: home with home PT pending medical optimization
- F: none  - E: replete K<4, Mg<2  - N: DASH/TLC, carb consistent diet  - D: hold coumadin in setting of supratherapeutic INR  - G: none    code: full  dispo: pending medical optimization, PT eval
- F: none  - E: replete K<4, Mg<2  - N: DASH/TLC, carb consistent diet  - D: hold coumadin in setting of supratherapeutic INR  - G: none    code: full  dispo: pending medical optimization
- F: none  - E: replete K<4, Mg<2  - N: DASH/TLC, carb consistent diet  - D: hold coumadin in setting of supratherapeutic INR  - G: none    code: full  dispo: pending medical optimization, PT eval
- A1C 6  - mISS  - carb consistent diet

## 2024-03-07 NOTE — PROGRESS NOTE ADULT - PROBLEM SELECTOR PLAN 1
Likely in the setting of flu and ADHF. Possibly with underlying MAREN.   -Pulm consulted, recs greatly appreciated  -Currently on nightly AVAPs and weaned off supplemental O2 during day, plan to trial off AVAPs tonight   -Monitor daily ABG   -MRSA PCR, urine legionella/strep, sputum culture per Pulm   -Wean NC as tolerated  -Mgmt of flu and ADHF as below   -Outpatient pulm follow up on d/c
- pt p/w SOB, SAENZ, orthopnea, cough   - hx CHF, pending med rec but reports recently stopping metolazone   - likely provoked in setting of flu, as below  - BNP 2938  - CXR with pulmonary vascular congestion  - c/w lasix 40mg IV BID --> may need to uptitrate   - cards consulted, f/u recs  - check TTE  - monitor I&O and daily weights  - monitor on tele
- pt p/w SOB, SAENZ, orthopnea, cough   - hx CHF, pending med rec but reports recently stopping metolazone   - likely provoked in setting of flu, as below  - BNP 2938  - CXR with pulmonary vascular congestion  - receiving lasix 40mg IV BID --> hold for now in setting of Cr below  - cards consulted, f/u recs  - check TTE  - monitor I&O and daily weights  - monitor on tele
Likely in the setting of flu and ADHF. Possibly with underlying MAREN.   -Pulm consulted, recs greatly appreciated  -Currently on nightly AVAPs and daily 2L NC   -Monitor daily ABG   -Will need to discuss whether patient will need AVAPs on discharge with Pulm   -MRSA PCR, urine legionella/strep, sputum culture per Pulm   -Wean NC as tolerated  -Mgmt of flu and ADHF as below   -Outpatient pulm follow up on d/c
- pt p/w SOB, SAENZ, orthopnea, cough   - hx CHF, pending med rec but reports recently stopping metolazone   - likely provoked in setting of flu, as below  - BNP 2938  - CXR with pulmonary vascular congestion  - receiving lasix 40mg IV BID --> hold for now in setting of Cr below  - cards consulted, f/u recs  - check TTE  - monitor I&O and daily weights  - monitor on tele
- pt p/w SOB, SAENZ, orthopnea, cough   - hx CHF, pending med rec but reports recently stopping metolazone   - likely provoked in setting of flu, as below  - BNP 2938  - CXR with pulmonary vascular congestion  - receiving lasix 40mg IV BID --> held due to RERE however, pt remains volume overloaded so will give IV lasix 40mg x 1 today and reassess daily for additional diuresis  - cards consulted, recs appreciated  - TTE pending   - monitor I&O and daily weights  - monitor on tele
Likely in the setting of flu and ADHF. Possibly with underlying MAREN.   -Pulm consulted, recs greatly appreciated  -Currently on nightly AVAPs and weaned off supplemental O2 during day, plan to trial off AVAPs tonight   -Monitor daily ABG   -MRSA PCR, urine legionella/strep, sputum culture per Pulm   -Mgmt of flu and ADHF as below   -Outpatient pulm follow up on d/c

## 2024-03-07 NOTE — PROGRESS NOTE ADULT - PROBLEM SELECTOR PROBLEM 4
Influenza A
Chronic kidney disease, unspecified CKD stage
Influenza A
Influenza A
Chronic kidney disease, unspecified CKD stage

## 2024-03-07 NOTE — PROGRESS NOTE ADULT - PROBLEM SELECTOR PROBLEM 6
Chronic kidney disease, unspecified CKD stage
HTN (hypertension)
Chronic kidney disease, unspecified CKD stage
HTN (hypertension)
Chronic kidney disease, unspecified CKD stage

## 2024-03-07 NOTE — PROGRESS NOTE ADULT - PROBLEM SELECTOR PLAN 8
- A1C 6  - mISS  - carb consistent diet
-c/w home statin
- A1C 6  - mISS  - carb consistent diet

## 2024-03-08 VITALS
TEMPERATURE: 99 F | RESPIRATION RATE: 18 BRPM | SYSTOLIC BLOOD PRESSURE: 134 MMHG | DIASTOLIC BLOOD PRESSURE: 75 MMHG | HEART RATE: 69 BPM | OXYGEN SATURATION: 98 %

## 2024-03-08 LAB
ANION GAP SERPL CALC-SCNC: 11 MMOL/L — SIGNIFICANT CHANGE UP (ref 7–14)
BASE EXCESS BLDA CALC-SCNC: 7.5 MMOL/L — HIGH (ref -2–3)
BUN SERPL-MCNC: 34 MG/DL — HIGH (ref 7–23)
CALCIUM SERPL-MCNC: 8.4 MG/DL — SIGNIFICANT CHANGE UP (ref 8.4–10.5)
CHLORIDE SERPL-SCNC: 102 MMOL/L — SIGNIFICANT CHANGE UP (ref 98–107)
CO2 BLDA-SCNC: 35 MMOL/L — HIGH (ref 19–24)
CO2 SERPL-SCNC: 28 MMOL/L — SIGNIFICANT CHANGE UP (ref 22–31)
CREAT SERPL-MCNC: 1.38 MG/DL — HIGH (ref 0.5–1.3)
EGFR: 54 ML/MIN/1.73M2 — LOW
GLUCOSE SERPL-MCNC: 95 MG/DL — SIGNIFICANT CHANGE UP (ref 70–99)
HCO3 BLDA-SCNC: 33 MMOL/L — HIGH (ref 21–28)
HCT VFR BLD CALC: 30.6 % — LOW (ref 39–50)
HGB BLD-MCNC: 10 G/DL — LOW (ref 13–17)
HOROWITZ INDEX BLDA+IHG-RTO: 21 — SIGNIFICANT CHANGE UP
INR BLD: 1.62 RATIO — HIGH (ref 0.85–1.18)
MAGNESIUM SERPL-MCNC: 1.7 MG/DL — SIGNIFICANT CHANGE UP (ref 1.6–2.6)
MCHC RBC-ENTMCNC: 26.7 PG — LOW (ref 27–34)
MCHC RBC-ENTMCNC: 32.7 GM/DL — SIGNIFICANT CHANGE UP (ref 32–36)
MCV RBC AUTO: 81.6 FL — SIGNIFICANT CHANGE UP (ref 80–100)
NRBC # BLD: 0 /100 WBCS — SIGNIFICANT CHANGE UP (ref 0–0)
NRBC # FLD: 0 K/UL — SIGNIFICANT CHANGE UP (ref 0–0)
PCO2 BLDA: 50 MMHG — HIGH (ref 35–48)
PH BLDA: 7.43 — SIGNIFICANT CHANGE UP (ref 7.35–7.45)
PHOSPHATE SERPL-MCNC: 2.2 MG/DL — LOW (ref 2.5–4.5)
PLATELET # BLD AUTO: 148 K/UL — LOW (ref 150–400)
PO2 BLDA: 84 MMHG — SIGNIFICANT CHANGE UP (ref 83–108)
POTASSIUM SERPL-MCNC: 4.4 MMOL/L — SIGNIFICANT CHANGE UP (ref 3.5–5.3)
POTASSIUM SERPL-SCNC: 4.4 MMOL/L — SIGNIFICANT CHANGE UP (ref 3.5–5.3)
PROTHROM AB SERPL-ACNC: 18 SEC — HIGH (ref 9.5–13)
RBC # BLD: 3.75 M/UL — LOW (ref 4.2–5.8)
RBC # FLD: 17 % — HIGH (ref 10.3–14.5)
SAO2 % BLDA: 96.8 % — SIGNIFICANT CHANGE UP (ref 94–98)
SODIUM SERPL-SCNC: 141 MMOL/L — SIGNIFICANT CHANGE UP (ref 135–145)
WBC # BLD: 5.12 K/UL — SIGNIFICANT CHANGE UP (ref 3.8–10.5)
WBC # FLD AUTO: 5.12 K/UL — SIGNIFICANT CHANGE UP (ref 3.8–10.5)

## 2024-03-08 PROCEDURE — 99239 HOSP IP/OBS DSCHRG MGMT >30: CPT

## 2024-03-08 PROCEDURE — 99233 SBSQ HOSP IP/OBS HIGH 50: CPT | Mod: GC

## 2024-03-08 RX ORDER — AMLODIPINE BESYLATE 2.5 MG/1
1 TABLET ORAL
Qty: 0 | Refills: 0 | DISCHARGE

## 2024-03-08 RX ORDER — WARFARIN SODIUM 2.5 MG/1
1 TABLET ORAL
Qty: 0 | Refills: 0 | DISCHARGE

## 2024-03-08 RX ORDER — MAGNESIUM SULFATE 500 MG/ML
1 VIAL (ML) INJECTION ONCE
Refills: 0 | Status: COMPLETED | OUTPATIENT
Start: 2024-03-08 | End: 2024-03-08

## 2024-03-08 RX ORDER — POTASSIUM PHOSPHATE, MONOBASIC POTASSIUM PHOSPHATE, DIBASIC 236; 224 MG/ML; MG/ML
15 INJECTION, SOLUTION INTRAVENOUS ONCE
Refills: 0 | Status: COMPLETED | OUTPATIENT
Start: 2024-03-08 | End: 2024-03-08

## 2024-03-08 RX ORDER — RIVAROXABAN 15 MG-20MG
1 KIT ORAL
Qty: 30 | Refills: 0
Start: 2024-03-08 | End: 2024-04-06

## 2024-03-08 RX ADMIN — POTASSIUM PHOSPHATE, MONOBASIC POTASSIUM PHOSPHATE, DIBASIC 62.5 MILLIMOLE(S): 236; 224 INJECTION, SOLUTION INTRAVENOUS at 15:31

## 2024-03-08 RX ADMIN — Medication 100 MILLIGRAM(S): at 13:32

## 2024-03-08 RX ADMIN — Medication 100 MILLIGRAM(S): at 06:55

## 2024-03-08 RX ADMIN — Medication 3 MILLILITER(S): at 06:51

## 2024-03-08 RX ADMIN — CARVEDILOL PHOSPHATE 25 MILLIGRAM(S): 80 CAPSULE, EXTENDED RELEASE ORAL at 06:55

## 2024-03-08 RX ADMIN — Medication 20 MILLIGRAM(S): at 06:56

## 2024-03-08 RX ADMIN — Medication 100 GRAM(S): at 13:33

## 2024-03-08 RX ADMIN — SPIRONOLACTONE 12.5 MILLIGRAM(S): 25 TABLET, FILM COATED ORAL at 06:55

## 2024-03-08 RX ADMIN — CARVEDILOL PHOSPHATE 25 MILLIGRAM(S): 80 CAPSULE, EXTENDED RELEASE ORAL at 17:59

## 2024-03-08 NOTE — PROGRESS NOTE ADULT - ATTENDING COMMENTS
Patient is a 74 yo M w/ HFpEF, CKD, HTN, Afib on warfarin who is admitted with acute hypercapnic respiratory failure in the setting of Flu AH1 and ADHF     #Acute hypercapnic respiratory failure - Slightly improved  #Flu  #ADHF  - Completed course of Tamiflu  - ABG off AVAPS 7., no need for NIPPV on discharge  - Diuresis as per cardiology and primary team to maintain euvolemia  - Also may have underlying MAREN, would benefit from outpatient PSG  - Please provide and encourage incentive spirometer and ambulaiton/OOB to chair as tolerated  - Needs outpatient pulmonary follow up upon discharge at [address] [place] [address] [address] ([phone]).  Please e-mail [email or web address] to make an appointment for the patient.  Please include the name, , and a phone number for you and the patient)      Alejandro Lopez MD  Pulmonary & Critical Care

## 2024-03-08 NOTE — PHARMACOTHERAPY INTERVENTION NOTE - COMMENTS
Discharge medications reviewed with the patient. Current medication schedule was discussed in detail including: medication name, indication, dose, administration times, treatment duration, side effects, and special instructions. Educated patient on warfarin (purpose, how to take, missed doses, INR, importance of outpatient follow-up, side effects, OTC medications to avoid, diet, etc). Reviewed signs and symptoms of bleeding to watch for and when to seek medical attention. Advised to avoid NSAIDs to limit risk of bleeding. Patient reports no issues taking warfarin and has been on it for over 10 years. He is aware other options are available, but says he can't afford the copays. Questions and concerns were answered and addressed. Patient demonstrated understanding.     Sushila Oakes, PharmD, BCPS  Clinical Pharmacy Specialist  [phone]

## 2024-03-08 NOTE — PROGRESS NOTE ADULT - SUBJECTIVE AND OBJECTIVE BOX
PULMONARY PROGRESS NOTE    PATIENT INFORMATION:  NAME: JOYCE KNIGHT:  MRN: [id number]    CHIEF COMPLAINT: Patient is a 73y old  Male who presents with a chief complaint of ADHF (07 Mar 2024 15:29)      [x] INITIAL CONSULT, H&P, FAMILY HISTORY and PAST MEDICAL AND SURGICAL HISTORY REVIEWED    OVERNIGHT EVENTS, CHANGES TO HPI, SUBJECTIVE:   - Patient seen and examined at bedside  - No overnight events  - Symptoms stable/improving    ========================REVIEW OF SYSTEMS========================  [x] ROS negative except as per HPI    ========================MEDICATIONS=============================  NEURO    CARDIO  carvedilol 25 milliGRAM(s) Oral every 12 hours  hydrALAZINE 100 milliGRAM(s) Oral three times a day  spironolactone 12.5 milliGRAM(s) Oral daily  torsemide 20 milliGRAM(s) Oral daily    PULM  albuterol/ipratropium for Nebulization 3 milliLiter(s) Nebulizer every 12 hours    FEN/GI  pantoprazole    Tablet 40 milliGRAM(s) Oral before breakfast    HEME/ONC    ANTIMICROBIALS    ENDO  allopurinol 100 milliGRAM(s) Oral daily  atorvastatin 10 milliGRAM(s) Oral at bedtime    OTHER  influenza  Vaccine (HIGH DOSE) 0.7 milliLiter(s) IntraMuscular once      PRN      ========================PHYSICAL EXAM============================    VITALS: Vital Signs Last 24 Hrs  T(C): 36.5 (08 Mar 2024 06:58), Max: 36.9 (07 Mar 2024 18:20)  T(F): 97.7 (08 Mar 2024 06:58), Max: 98.5 (07 Mar 2024 18:20)  HR: 61 (08 Mar 2024 06:58) (60 - 71)  BP: 144/78 (08 Mar 2024 06:58) (127/66 - 144/78)  BP(mean): --  RR: 18 (08 Mar 2024 06:58) (18 - 18)  SpO2: 100% (08 Mar 2024 06:58) (98% - 100%)    Parameters below as of 08 Mar 2024 06:58  Patient On (Oxygen Delivery Method): room air        INTAKE and OUTPUT: I&O's Detail    07 Mar 2024 07:01  -  08 Mar 2024 07:00  --------------------------------------------------------  IN:    Oral Fluid: 1000 mL  Total IN: 1000 mL    OUT:    Voided (mL): 3700 mL  Total OUT: 3700 mL    Total NET: -2700 mL          VENTILATOR SETTINGS:     Physical Exam  CONST:        NAD, obese, awake and alert  ENMT:         MMM, no pharyngeal injection or exudates; no LAD  RESP:           Normal effort and expansion. Normal and equal air entry. No WRR.  CHEST:        No tenderness. No cardiac devices, lines, or chest tubes.   CARD:          RRR, normal S1,S2. no MRG.  VASC:          No appreciable JVD; 1+ LE edema  ABD:            Soft, nontender, nondistended  MSK:            No clubbing or cyanosis of digits  EXT:             WWP; cap refill <2s; pulses are 2+ B/L  PSYCH:         Mood and affect appropriate  NEURO:       Non-focal; moving all extremities   SKIN:            No visible rashes on exposed skin     ======================LABORATORY RESULTS AND IMAGING=============                        10.0   5.12  )-----------( 148      ( 08 Mar 2024 03:30 )             30.6                                  03-08    141  |  102  |  34<H>  ----------------------------<  95  4.4   |  28  |  1.38<H>    Ca    8.4      08 Mar 2024 03:30  Phos  2.2     03-08  Mg     1.70     03-08      N:4.63/L:0.56= __ 03-01-24 @ 06:17  N:4.11/L:0.59= __ 02-29-24 @ 14:31    Ref-range: <3 normal, >9 elevated, >18 very elevated    Procalcitonin, Serum: 0.07 ng/mL (03-01-24 @ 06:17)        ABG Trend  03-08-24 @ 04:16 [id number]  - 7.43/50/84/33/96.8 Lactate --  03-06-24 @ 04:23 [id number]  - 7.37/55/169/32/98.9 Lactate --  03-05-24 @ 12:36 [id number]  - 7.32/64/67/33/93.9 Lactate --  03-01-24 @ 16:14 [id number]  - 7.31/56/105/28/97.8 Lactate --  03-01-24 @ 14:30 [id number]  - 7.28/61/41/29/66.6 Lactate --    VBG Trend  03-05-24 @ 06:08 FiO2--  - 7.30/68/55/34/89.0 Lactate 1.1  03-04-24 @ 13:30 FiO2--  - 7.23/78/29/33/45.4 Lactate 0.9  03-03-24 @ 06:15 FiO2--  - 7.26/63/62/28/91.5 Lactate 1.1  03-02-24 @ 00:55 FiO2--  - 7.24/70/44/30/71.1 Lactate --  03-01-24 @ 06:17 FiO2--  - 7.22/70/71/29/93.6 Lactate 0.9      Creatinine Trend: 1.38<--, 1.44<--, 1.56<--, 1.70<--, 1.94<--, 2.04<--    [x] RADIOLOGY REVIEWED AND INTERPRETED BY ME

## 2024-03-08 NOTE — PROGRESS NOTE ADULT - SUBJECTIVE AND OBJECTIVE BOX
Date of service 3/8/24    extended hpi: 73-year-old male with history of  HFpEF, CKD,  hypertension, chronic A-fib on warfarin (OP Cardio Dr Johansen), who presents with 1 week of LE edema and 3 weeks of SOB. He was found with acute influenza, Acute hypoxemic and hypercapnic respiratory failure, and acute on chronic HFpEF      DATE OF SERVICE: 03-08-24    Patient denies chest pain or shortness of breath.   Review of symptoms otherwise negative.    MEDICATIONS:  acetaminophen     Tablet .. 650 milliGRAM(s) Oral every 6 hours PRN  albuterol/ipratropium for Nebulization 3 milliLiter(s) Nebulizer every 12 hours  allopurinol 100 milliGRAM(s) Oral daily  atorvastatin 10 milliGRAM(s) Oral at bedtime  carvedilol 25 milliGRAM(s) Oral every 12 hours  hydrALAZINE 100 milliGRAM(s) Oral three times a day  influenza  Vaccine (HIGH DOSE) 0.7 milliLiter(s) IntraMuscular once  magnesium sulfate  IVPB 1 Gram(s) IV Intermittent once  melatonin 3 milliGRAM(s) Oral at bedtime PRN  pantoprazole    Tablet 40 milliGRAM(s) Oral before breakfast  potassium phosphate IVPB 15 milliMole(s) IV Intermittent once  spironolactone 12.5 milliGRAM(s) Oral daily  torsemide 20 milliGRAM(s) Oral daily      LABS:                        10.0   5.12  )-----------( 148      ( 08 Mar 2024 03:30 )             30.6       Hemoglobin: 10.0 g/dL (03-08 @ 03:30)  Hemoglobin: 10.0 g/dL (03-07 @ 04:05)  Hemoglobin: 9.9 g/dL (03-06 @ 04:23)  Hemoglobin: 10.1 g/dL (03-05 @ 18:40)  Hemoglobin: 10.0 g/dL (03-05 @ 06:08)      03-08    141  |  102  |  34<H>  ----------------------------<  95  4.4   |  28  |  1.38<H>    Ca    8.4      08 Mar 2024 03:30  Phos  2.2     03-08  Mg     1.70     03-08      Creatinine Trend: 1.38<--, 1.44<--, 1.56<--, 1.70<--, 1.94<--, 2.04<--    COAGS:           PHYSICAL EXAM:  T(C): 36.5 (03-08-24 @ 06:58), Max: 36.9 (03-07-24 @ 18:20)  HR: 61 (03-08-24 @ 06:58) (60 - 71)  BP: 144/78 (03-08-24 @ 06:58) (127/66 - 144/78)  RR: 18 (03-08-24 @ 06:58) (18 - 18)  SpO2: 100% (03-08-24 @ 06:58) (98% - 100%)  Wt(kg): --    I&O's Summary    07 Mar 2024 07:01  -  08 Mar 2024 07:00  --------------------------------------------------------  IN: 1000 mL / OUT: 3700 mL / NET: -2700 mL        Gen: Appears well in NAD  HEENT:  (-)icterus (-)pallor  CV: N S1 S2 1/6 BRANDON (+)2 Pulses B/l  Resp:  Clear to ausculatation B/L, normal effort  GI: (+) BS Soft, NT, ND  Lymph:  (2+)LE Edema, (-)obvious lymphadenopathy  Skin: Warm to touch, Normal turgor  Psych: Appropriate mood and affect      TELEMETRY: 	 Afib     ECG:  	AFib    < from: Xray Chest 2 Views PA/Lat (02.29.24 @ 15:08) >    IMPRESSION:    Mildly prominent perihilar interstitial markings.    Cardiomegaly.    < end of copied text >    < from: TTE with Doppler (w/Cont) (03.17.22 @ 11:13) >  CONCLUSIONS:  1. Mitral annular calcification, otherwise normal mitral  valve. Mild mitral regurgitation.  2. Severely dilated left atrium.  LA volume index = 69  cc/m2.  3. Mild concentric left ventricular hypertrophy.  4. Endocardium not well visualized; grossly normal left  ventricular systolic function.  Endocardial visualization  enhanced with intravenous injection of echo contrast  (Definity).  5. Severe right atrial enlargement.  6. The right ventricle is not well visualized; grossly  normal right ventricular systolic function.  7. Estimated right ventricular systolic pressure equals 60  mm Hg, assuming right atrial pressure equals 10 mm Hg,  consistent with moderate pulmonary hypertension.  ------------------------------------------------------------------------  Confirmed on  3/17/2022 - 12:50:26 by Angel Rolle M.D.,  [facility], FASE    < end of copied text >    < from: TTE W or WO Ultrasound Enhancing Agent (03.04.24 @ 10:33) >  CONCLUSIONS:      1. Technically difficult image quality.   2. The leftventricular cavity is normal in size. Left ventricular wall thickness is mildly increased. Left ventricular systolic function is normal with an ejection fraction visually estimated at 60 to 65%. There are no regional wall motion abnormalities seen. Mild left ventricular hypertrophy. There is increased LV mass and concentric hypertrophy.   3. The right ventricle is not well visualized.   4. Structurally normal mitral valve with normal leaflet excursion. There is calcification of the mitral valve annulus. There is mild mitral regurgitation.    < end of copied text >      ASSESSMENT/PLAN: 	73-year-old male with history of  HFpEF, CKD,  hypertension, chronic A-fib on warfarin (OP Cardio Dr Crowley at [facility]), who presents with 1 week of LE edema and 3 weeks of SOB. He was found with acute influenza, Acute hypoxemic and hypercapnic respiratory failure, and acute on chronic HFpEF    #SOB  --multifactorial  --s/p Tamiflu, s/p IV Lasix, and was on Bipap  --improved   --renal function stable      #HFpEF  --s/p IV diuresis, now compensated, now continue home oral diuretic regimen( Torsemide and Aldactone)  --cont Coreg and Hydralazine for HTN and afterload reduction  --TTE with Normal LV sys function, mild LVH    #chronic Afib  --rates well controlled on Coreg  --cont lifelong AC   --on Coumadin, Goal INR 2-3      F/U with OP Cardiologist Dr ARIADNA Marte MD  Pager: [phone]

## 2024-03-08 NOTE — PROGRESS NOTE ADULT - PROVIDER SPECIALTY LIST ADULT
Cardiology
Cardiology
Pulmonology
Cardiology
Cardiology
Pulmonology
Cardiology
Pulmonology
Pulmonology
Hospitalist

## 2024-03-08 NOTE — PROGRESS NOTE ADULT - ASSESSMENT
73-year-old male with history of  HFpEF, CKD,  hypertension, A-fib on warfarin comes into the ED from doctor's office for evaluation of shortness of breath. CXR with cardiomegaly and elevated BNP. Flu+    # Acute hypoxemic and hypercapnic respiratory failure   # Influenza pneumonia - s/p treatment  # Acute decompensated heart failure    - abg off of NIV - 7.43/50/84/33/96.8  - can d/c NIV. hypercapnia was primarlily due to V/Q mismatching from acute pulmonary edema which is resolving  - s/p tamiflu x5 days  - c/w diuresis, strict I&O, goal net negative 1-2L/day  - patient should have outpatient PSG for likely sleep apnea (can't prescribe PAP therapy for home without PSG)  - titrate oxygen to O2 >90%, use humidified oxygen for >4L; incentive spirometry; OOBTC; PT; aspiration precautions and airway clearance as necessary; DVT ppx as tolerated     This patient will need to follow up with the pulmonary clinic after discharge:  If at [facility] please put "Pulmonary HOME Program" in the discharge token and PAS will schedule the followup.  Otherwise, 1-2 business days prior to discharge please email: [email or web address] to setup an appointment. A pulmonology telehealth appointment will be made for 48 business hours after discharge. This appointment will be used to check on patient and triage followup with the most appropriate pulmonologist. Please include the patient's name, , MRN, hospital being discharged from, diagnosis, and contact information in the email.     Please discuss the appointment details with the patient and include the appointment details in the patients discharge summary.     Pulmonary team to sign off, please call back with questions 
Pt is a 72 yo M with PMH a-fib (coumadin), HTN, HLD, T2D, CHF, gout, and CKD p/w SOB, orthopnea, and cough. Found to be in acute CHF exacerbation with RVP+ flu A. Cardiology and pulm following, on AVAPs overnight. 
73-year-old male with history of  HFpEF, CKD,  hypertension, A-fib on warfarin comes into the ED from doctor's office for evaluation of shortness of breath. CXR with cardiomegaly and elevated BNP. Flu+    # Acute hypoxemic and hypercapnic respiratory failure   # Influenza pneumonia  # Acute decompensated heart failure    - s/p tamiflu x5 days  - f/u sputum cx  - c/w diuresis, strict I&O, goal net negative 1-2L/day  - overall hypercapnia now improving with appropriate diuresis.  Can trial patient off of AVAPS overnight and check ABG in the morning  - patient should have outpatient PSG for likely sleep apnea (can't prescribe PAP therapy for home without PSG)    This patient will need to follow up with the pulmonary clinic after discharge:  If at [facility] please put "Pulmonary HOME Program" in the discharge token and PAS will schedule the followup.  Otherwise, 1-2 business days prior to discharge please email: [email or web address] to setup an appointment. A pulmonology telehealth appointment will be made for 48 business hours after discharge. This appointment will be used to check on patient and triage followup with the most appropriate pulmonologist. Please include the patient's name, , MRN, hospital being discharged from, diagnosis, and contact information in the email.     Please discuss the appointment details with the patient and include the appointment details in the patients discharge summary. 
73-year-old male with history of  HFpEF, CKD,  hypertension, A-fib on warfarin comes into the ED from doctor's office for evaluation of shortness of breath. CXR with cardiomegaly and elevated BNP. Flu+    # Acute hypoxemic and hypercapnic respiratory failure   # Influenza pneumonia  # Acute decompensated heart failure    - tamiflu x5 days  - sputum cx, blood cx, MRSA/MSSA nasal PCR, urine legionella, urine strep ag  - c/w diuresis, strict I&O, goal net negative 1-2L/day  - please check ABG in AM  - patient should have outpatient PSG for likely sleep apnea (can't prescribe PAP therapy for home without PSG)    This patient will need to follow up with the pulmonary clinic after discharge:  If at [facility] please put "Pulmonary HOME Program" in the discharge token and PAS will schedule the followup.  Otherwise, 1-2 business days prior to discharge please email: [email or web address] to setup an appointment. A pulmonology telehealth appointment will be made for 48 business hours after discharge. This appointment will be used to check on patient and triage followup with the most appropriate pulmonologist. Please include the patient's name, , MRN, hospital being discharged from, diagnosis, and contact information in the email.     Please discuss the appointment details with the patient and include the appointment details in the patients discharge summary. 
CARDIOLOGY ATTENDING    Agree with above. Continue IV diuresis, continue lifelong anticoagulation for AF, and f/u echo
CARDIOLOGY ATTENDING    Agree with above. Continue IV diuresis, continue lifelong anticoagulation for AF, and f/u echo
Pt is a 72 yo M with PMH a-fib (coumadin), HTN, HLD, T2D, CHF, gout, and CKD p/w SOB, orthopnea, and cough. Found to be in acute CHF exacerbation with RVP+ flu A. Cardiology and pulm following, on BIPAP and s/p IV lasix with repeat CXR improved. Pending TTE; LE dopplers neg. Pending O2 weaning, on 2L NC.
73-year-old male with history of  HFpEF, CKD,  hypertension, A-fib on warfarin comes into the ED from doctor's office for evaluation of shortness of breath. CXR with cardiomegaly and elevated BNP. Flu+    # Acute hypoxemic and hypercapnic respiratory failure   # Influenza pneumonia  # Acute decompensated heart failure    - tamiflu x5 days  - sputum cx, blood cx, MRSA/MSSA nasal PCR, urine legionella, urine strep ag  - c/w diuresis as tolerated, strict I&O, goal net negative 1-2L/day  - please check blood gas in the AM
Pt is a 74 yo M with PMH a-fib (coumadin), HTN, HLD, T2D, CHF, gout, and CKD p/w SOB, orthopnea, and cough. Found to be in acute CHF exacerbation with RVP+ flu A. Cardiology and pulm following, on BIPAP and IV lasix. Pending TTE and LE dopplers.
Pt is a 72 yo M with PMH a-fib (coumadin), HTN, HLD, T2D, CHF, gout, and CKD p/w SOB, orthopnea, and cough. Found to be in acute CHF exacerbation with RVP+ flu A. Cardiology and pulm following, on BIPAP and s/p IV lasix with repeat CXR improved. Pending TTE; LE dopplers neg. Pending O2 weaning, on 2L NC.
Pt is a 74 yo M with PMH a-fib (coumadin), HTN, HLD, T2D, CHF, gout, and CKD p/w SOB, orthopnea, and cough. Found to be in acute CHF exacerbation with RVP+ flu A. Cardiology and pulm following, on BIPAP and IV lasix. Pending TTE; LE dopplers neg.

## 2024-03-08 NOTE — PROGRESS NOTE ADULT - TIME BILLING
review of laboratory data, radiology results, consultants' recommendations, documentation in [place], discussion with patient/ACP and interdisciplinary staff (such as , social workers, etc). Interventions were performed as documented above.
Medical management as above, reviewing chart and coordinating care with primary team/staff, as well as reviewing vitals, radiology, medication list, recent labs, and prior records.    Does not include teaching time.
review of laboratory data, radiology results, consultants' recommendations, documentation in [place], discussion with patient/ACP and interdisciplinary staff (such as , social workers, etc). Interventions were performed as documented above.
review of laboratory data, radiology results, consultants' recommendations, documentation in [place], discussion with patient/ACP and interdisciplinary staff (such as , social workers, etc). Interventions were performed as documented above.
review of laboratory data, radiology results, consultants' recommendations, documentation in [place], discussion with patient/ACP and interdisciplinary staff (such as , social workers, etc). Interventions were performed as documented above.
review of laboratory data, radiology results, consultants' recommendations, documentation in [place], discussion with patient/ACP and interdisciplinary staff (such as , social workers, etc). Interventions were performed as documented above.

## 2024-03-08 NOTE — CHART NOTE - NSCHARTNOTEFT_GEN_A_CORE
Pt seen/examined at bedside this AM, all questions/concerns addressed. Patient to call his PCP's office today and schedule an INR check on 3/11. Outpatient pulmonology office emailed to set up outpatient follow up. Patient's outpatient Cardiologist's office called yesterday, they stated they will set up a follow up appointment in next 2 weeks with patient. Plan for discharge to home. See discharge note provider for additional information. Plan discussed with patient and ACP.

## 2024-03-11 ENCOUNTER — APPOINTMENT (OUTPATIENT)
Dept: PULMONOLOGY | Facility: CLINIC | Age: 74
End: 2024-03-11

## 2024-03-11 ENCOUNTER — NON-APPOINTMENT (OUTPATIENT)
Age: 74
End: 2024-03-11

## 2024-03-22 ENCOUNTER — APPOINTMENT (OUTPATIENT)
Dept: PULMONOLOGY | Facility: CLINIC | Age: 74
End: 2024-03-22
Payer: MEDICARE

## 2024-03-22 VITALS
BODY MASS INDEX: 41.35 KG/M2 | WEIGHT: 280 LBS | SYSTOLIC BLOOD PRESSURE: 114 MMHG | TEMPERATURE: 96 F | HEART RATE: 63 BPM | DIASTOLIC BLOOD PRESSURE: 62 MMHG | OXYGEN SATURATION: 97 %

## 2024-03-22 DIAGNOSIS — R06.02 SHORTNESS OF BREATH: ICD-10-CM

## 2024-03-22 PROCEDURE — 99214 OFFICE O/P EST MOD 30 MIN: CPT | Mod: 25

## 2024-03-22 PROCEDURE — 94060 EVALUATION OF WHEEZING: CPT

## 2024-03-22 PROCEDURE — 94727 GAS DIL/WSHOT DETER LNG VOL: CPT

## 2024-03-22 PROCEDURE — 94729 DIFFUSING CAPACITY: CPT

## 2024-03-22 NOTE — REASON FOR VISIT
[Shortness of Breath] : shortness of breath [Follow-Up - From Hospitalization] : a follow-up visit after a recent hospitalization

## 2024-03-22 NOTE — PROCEDURE
[FreeTextEntry1] : PFT 5/26/22: No obstruction. Moderate restriction. Diffusion capacity was normal. Mild improvement after bronchodilator.   PFT 3/22/24: Moderate obstruction.  Severe restriction noted.  Diffusion was normal.  Significant improvement after bronchodilator.  Chest x-ray 3/2/24: Cardiomegaly.  Echo 3/4/24: Left ventricular function was normal.  Ejection fraction was 60 to 65%.  Mild LVH.

## 2024-03-22 NOTE — PHYSICAL EXAM
[No Neck Mass] : no neck mass [No Acute Distress] : no acute distress [Normal S1, S2] : normal s1, s2 [Clear to Auscultation Bilaterally] : clear to auscultation bilaterally [No HSM] : no hsm [No Cyanosis] : no cyanosis [Normal Turgor] : normal turgor [Oriented x3] : oriented x3 [Normal Appearance] : normal appearance [Normal Rate/Rhythm] : normal rate/rhythm [No Resp Distress] : no resp distress [No Abnormalities] : no abnormalities [No Clubbing] : no clubbing [2+ Pitting] : 2+ pitting [No Focal Deficits] : no focal deficits

## 2024-03-22 NOTE — REVIEW OF SYSTEMS
[Edema] : edema [Fever] : no fever [Fatigue] : no fatigue [Epistaxis] : no epistaxis [Postnasal Drip] : no postnasal drip [Cough] : no cough [Hemoptysis] : no hemoptysis [Chest Tightness] : no chest tightness [Sputum] : no sputum [Dyspnea] : no dyspnea [Wheezing] : no wheezing [Chest Discomfort] : no chest discomfort [Nasal Discharge] : no nasal discharge [Myalgias] : no myalgias [GERD] : no gerd [Back Pain] : no back pain [Rash] : no rash [Anemia] : no anemia [Anxiety] : no anxiety [Headache] : no headache [Diabetes] : no diabetes [Thyroid Problem] : no thyroid problem

## 2024-03-22 NOTE — DISCUSSION/SUMMARY
[FreeTextEntry1] : He is a 73 year old man with a history of hypertension, hyperlipidemia, atrial fibrillation and CHF. No prior history of pulmonary disease. He never smoked. The occupational history was unremarkable.   Impression Recent hospitalization for CHF -Improved Obstructive airways disease noted on PFT -Denies any coughing or wheezing Never smoked  Recommend Continue with current regimen Stated that he was not short of breath or wheezing therefore he declined any trial of inhalers Follow-up with cardiology I will see him again in 3 to 6 months, sooner if needed

## 2024-03-22 NOTE — HISTORY OF PRESENT ILLNESS
[Never] : never [TextBox_4] : He is a 73-year-old man who was hospitalized for shortness of breath in early March.  He was found to have congestive heart failure.  He was placed on diuretics and has improved.  He does not have any shortness of breath now.  His leg edema has decreased markedly.  He denies any coughing or wheezing.  No chest pain.  No constitutional symptoms.  He never smoked.  Medications: Allopurinol, amlodipine, atorvastatin, carvedilol, hydralazine, omeprazole, spironolactone, torsemide and warfarin. [Awakes Unrefreshed] : does not awaken unrefreshed [Difficulty Initiating Sleep] : does not have difficulty initiating sleep [Difficulty Maintaining Sleep] : does not have difficulty maintaining sleep

## 2024-09-24 ENCOUNTER — NON-APPOINTMENT (OUTPATIENT)
Age: 74
End: 2024-09-24

## 2024-09-25 ENCOUNTER — APPOINTMENT (OUTPATIENT)
Dept: PULMONOLOGY | Facility: CLINIC | Age: 74
End: 2024-09-25
Payer: MEDICARE

## 2024-09-25 VITALS
WEIGHT: 277 LBS | TEMPERATURE: 97.5 F | DIASTOLIC BLOOD PRESSURE: 80 MMHG | OXYGEN SATURATION: 99 % | SYSTOLIC BLOOD PRESSURE: 128 MMHG | HEART RATE: 79 BPM | BODY MASS INDEX: 40.91 KG/M2

## 2024-09-25 DIAGNOSIS — R06.02 SHORTNESS OF BREATH: ICD-10-CM

## 2024-09-25 DIAGNOSIS — J44.9 CHRONIC OBSTRUCTIVE PULMONARY DISEASE, UNSPECIFIED: ICD-10-CM

## 2024-09-25 PROCEDURE — 99214 OFFICE O/P EST MOD 30 MIN: CPT

## 2024-09-25 NOTE — DISCUSSION/SUMMARY
[FreeTextEntry1] : He is a 74 year old man with a history of hypertension, hyperlipidemia, atrial fibrillation and CHF. No prior history of pulmonary disease. He never smoked. The occupational history was unremarkable.   Impression SAENZ CHF -Improved Obstructive airways disease noted on PFT -Denies any coughing or wheezing Never smoked  Recommend Continue with current regimen - Not on any inhalers Follow up with Cardiology Follow up in 6 months - sooner if necessary

## 2024-09-25 NOTE — HISTORY OF PRESENT ILLNESS
[Never] : never [TextBox_4] : He is a 73-year-old man who was hospitalized for shortness of breath in early March 2024.  He was found to have congestive heart failure.  He was placed on diuretics and has improved.  He never smoked.  The patient came for a scheduled follow up visit today.   C/O SAENZ. No chest pain, pressure or palpitations. No SOB at rest. No wheezing. Not on any inhalers.  [Awakes Unrefreshed] : does not awaken unrefreshed [Difficulty Initiating Sleep] : does not have difficulty initiating sleep [Difficulty Maintaining Sleep] : does not have difficulty maintaining sleep

## 2024-09-25 NOTE — PHYSICAL EXAM
[No Acute Distress] : no acute distress [Normal Appearance] : normal appearance [Normal Rate/Rhythm] : normal rate/rhythm [Normal S1, S2] : normal s1, s2 [No Resp Distress] : no resp distress [Clear to Auscultation Bilaterally] : clear to auscultation bilaterally [No Abnormalities] : no abnormalities [No HSM] : no hsm [No Clubbing] : no clubbing [No Cyanosis] : no cyanosis [2+ Pitting] : 2+ pitting [Normal Turgor] : normal turgor [No Focal Deficits] : no focal deficits [Oriented x3] : oriented x3 [Normal Affect] : normal affect

## 2024-09-25 NOTE — REVIEW OF SYSTEMS
[Edema] : edema [Fever] : no fever [Fatigue] : no fatigue [Epistaxis] : no epistaxis [Postnasal Drip] : no postnasal drip [Cough] : no cough [Chest Tightness] : no chest tightness [Sputum] : no sputum [Wheezing] : no wheezing [SOB on Exertion] : sob on exertion [Chest Discomfort] : no chest discomfort [Palpitations] : no palpitations [Nasal Discharge] : no nasal discharge [GERD] : no gerd [Myalgias] : no myalgias [Back Pain] : no back pain [Rash] : no rash [Anemia] : no anemia [Headache] : no headache [Anxiety] : no anxiety [Diabetes] : no diabetes [Thyroid Problem] : no thyroid problem

## 2025-03-19 ENCOUNTER — APPOINTMENT (OUTPATIENT)
Dept: PULMONOLOGY | Facility: CLINIC | Age: 75
End: 2025-03-19

## (undated) DEVICE — SNARE CAPTIVATOR II RND STIFF 15MM

## (undated) DEVICE — BITE BLOCK ADULT 20 X 27MM (GREEN)

## (undated) DEVICE — SOL INJ NS 0.9% 500ML 1-PORT

## (undated) DEVICE — FORCEP BIOPSY 2.5MM DISP

## (undated) DEVICE — TUBING CANNULA SALTER LABS NASAL ADULT 7FT

## (undated) DEVICE — VENODYNE/SCD SLEEVE CALF MEDIUM

## (undated) DEVICE — PLATE NESSY 170

## (undated) DEVICE — KIT ENDO PROCEDURE CUST W/VLV